# Patient Record
Sex: MALE | Race: WHITE | NOT HISPANIC OR LATINO | Employment: OTHER | URBAN - METROPOLITAN AREA
[De-identification: names, ages, dates, MRNs, and addresses within clinical notes are randomized per-mention and may not be internally consistent; named-entity substitution may affect disease eponyms.]

---

## 2017-01-19 ENCOUNTER — OFFICE VISIT - HEALTHEAST (OUTPATIENT)
Dept: CARDIOLOGY | Facility: CLINIC | Age: 82
End: 2017-01-19

## 2017-01-19 ENCOUNTER — COMMUNICATION - HEALTHEAST (OUTPATIENT)
Dept: TELEHEALTH | Facility: CLINIC | Age: 82
End: 2017-01-19

## 2017-01-19 DIAGNOSIS — I48.3 TYPICAL ATRIAL FLUTTER (H): ICD-10-CM

## 2017-01-19 DIAGNOSIS — I10 ESSENTIAL HYPERTENSION: ICD-10-CM

## 2017-01-19 LAB
ATRIAL RATE - MUSE: 71 BPM
DIASTOLIC BLOOD PRESSURE - MUSE: NORMAL MMHG
INTERPRETATION ECG - MUSE: NORMAL
P AXIS - MUSE: 42 DEGREES
PR INTERVAL - MUSE: 164 MS
QRS DURATION - MUSE: 84 MS
QT - MUSE: 414 MS
QTC - MUSE: 449 MS
R AXIS - MUSE: 54 DEGREES
SYSTOLIC BLOOD PRESSURE - MUSE: NORMAL MMHG
T AXIS - MUSE: 30 DEGREES
VENTRICULAR RATE- MUSE: 71 BPM

## 2017-01-19 ASSESSMENT — MIFFLIN-ST. JEOR: SCORE: 1335.56

## 2017-02-01 ENCOUNTER — AMBULATORY - HEALTHEAST (OUTPATIENT)
Dept: CARDIOLOGY | Facility: CLINIC | Age: 82
End: 2017-02-01

## 2017-02-28 ENCOUNTER — RECORDS - HEALTHEAST (OUTPATIENT)
Dept: LAB | Facility: CLINIC | Age: 82
End: 2017-02-28

## 2017-02-28 LAB
CHOLEST SERPL-MCNC: 183 MG/DL
FASTING STATUS PATIENT QL REPORTED: NO
HDLC SERPL-MCNC: 59 MG/DL
LDLC SERPL CALC-MCNC: 98 MG/DL
TRIGL SERPL-MCNC: 128 MG/DL

## 2018-03-29 ENCOUNTER — RECORDS - HEALTHEAST (OUTPATIENT)
Dept: LAB | Facility: CLINIC | Age: 83
End: 2018-03-29

## 2018-03-29 LAB
ALBUMIN SERPL-MCNC: 3.8 G/DL (ref 3.5–5)
ALP SERPL-CCNC: 63 U/L (ref 45–120)
ALT SERPL W P-5'-P-CCNC: 25 U/L (ref 0–45)
ANION GAP SERPL CALCULATED.3IONS-SCNC: 11 MMOL/L (ref 5–18)
AST SERPL W P-5'-P-CCNC: 28 U/L (ref 0–40)
BILIRUB SERPL-MCNC: 1.2 MG/DL (ref 0–1)
BUN SERPL-MCNC: 14 MG/DL (ref 8–28)
CALCIUM SERPL-MCNC: 9.9 MG/DL (ref 8.5–10.5)
CHLORIDE BLD-SCNC: 103 MMOL/L (ref 98–107)
CHOLEST SERPL-MCNC: 190 MG/DL
CO2 SERPL-SCNC: 28 MMOL/L (ref 22–31)
CREAT SERPL-MCNC: 0.96 MG/DL (ref 0.7–1.3)
FASTING STATUS PATIENT QL REPORTED: NO
GFR SERPL CREATININE-BSD FRML MDRD: >60 ML/MIN/1.73M2
GLUCOSE BLD-MCNC: 108 MG/DL (ref 70–125)
HDLC SERPL-MCNC: 64 MG/DL
LDLC SERPL CALC-MCNC: 102 MG/DL
POTASSIUM BLD-SCNC: 4.1 MMOL/L (ref 3.5–5)
PROT SERPL-MCNC: 6.8 G/DL (ref 6–8)
SODIUM SERPL-SCNC: 142 MMOL/L (ref 136–145)
TRIGL SERPL-MCNC: 122 MG/DL
URATE SERPL-MCNC: 6.1 MG/DL (ref 3–8)

## 2019-04-11 ENCOUNTER — RECORDS - HEALTHEAST (OUTPATIENT)
Dept: LAB | Facility: CLINIC | Age: 84
End: 2019-04-11

## 2019-04-11 LAB
ALBUMIN SERPL-MCNC: 3.9 G/DL (ref 3.5–5)
ALP SERPL-CCNC: 55 U/L (ref 45–120)
ALT SERPL W P-5'-P-CCNC: 30 U/L (ref 0–45)
ANION GAP SERPL CALCULATED.3IONS-SCNC: 10 MMOL/L (ref 5–18)
AST SERPL W P-5'-P-CCNC: 29 U/L (ref 0–40)
BILIRUB SERPL-MCNC: 0.8 MG/DL (ref 0–1)
BUN SERPL-MCNC: 20 MG/DL (ref 8–28)
CALCIUM SERPL-MCNC: 9.9 MG/DL (ref 8.5–10.5)
CHLORIDE BLD-SCNC: 104 MMOL/L (ref 98–107)
CHOLEST SERPL-MCNC: 219 MG/DL
CO2 SERPL-SCNC: 29 MMOL/L (ref 22–31)
CREAT SERPL-MCNC: 1.14 MG/DL (ref 0.7–1.3)
ERYTHROCYTE [DISTWIDTH] IN BLOOD BY AUTOMATED COUNT: 14.3 % (ref 11–14.5)
FASTING STATUS PATIENT QL REPORTED: YES
GFR SERPL CREATININE-BSD FRML MDRD: >60 ML/MIN/1.73M2
GLUCOSE BLD-MCNC: 107 MG/DL (ref 70–125)
HCT VFR BLD AUTO: 46.3 % (ref 40–54)
HDLC SERPL-MCNC: 54 MG/DL
HGB BLD-MCNC: 15.5 G/DL (ref 14–18)
LDLC SERPL CALC-MCNC: 135 MG/DL
MCH RBC QN AUTO: 32 PG (ref 27–34)
MCHC RBC AUTO-ENTMCNC: 33.5 G/DL (ref 32–36)
MCV RBC AUTO: 96 FL (ref 80–100)
PLATELET # BLD AUTO: 171 THOU/UL (ref 140–440)
PMV BLD AUTO: 11 FL (ref 8.5–12.5)
POTASSIUM BLD-SCNC: 4.3 MMOL/L (ref 3.5–5)
PROT SERPL-MCNC: 6.4 G/DL (ref 6–8)
RBC # BLD AUTO: 4.85 MILL/UL (ref 4.4–6.2)
SODIUM SERPL-SCNC: 143 MMOL/L (ref 136–145)
TRIGL SERPL-MCNC: 149 MG/DL
URATE SERPL-MCNC: 5.7 MG/DL (ref 3–8)
WBC: 6.6 THOU/UL (ref 4–11)

## 2021-02-04 ENCOUNTER — COMMUNICATION - HEALTHEAST (OUTPATIENT)
Dept: SCHEDULING | Facility: CLINIC | Age: 86
End: 2021-02-04

## 2021-02-04 ENCOUNTER — HOME CARE/HOSPICE - HEALTHEAST (OUTPATIENT)
Dept: HOME HEALTH SERVICES | Facility: HOME HEALTH | Age: 86
End: 2021-02-04

## 2021-02-08 ENCOUNTER — SURGERY - HEALTHEAST (OUTPATIENT)
Dept: CARDIOLOGY | Facility: HOSPITAL | Age: 86
End: 2021-02-08

## 2021-02-08 ASSESSMENT — MIFFLIN-ST. JEOR
SCORE: 1435.14
SCORE: 1379.34

## 2021-02-09 ASSESSMENT — MIFFLIN-ST. JEOR
SCORE: 1377.53
SCORE: 1353.03

## 2021-02-15 ENCOUNTER — AMBULATORY - HEALTHEAST (OUTPATIENT)
Dept: CARDIOLOGY | Facility: CLINIC | Age: 86
End: 2021-02-15

## 2021-02-15 DIAGNOSIS — I48.91 ATRIAL FIBRILLATION WITH RVR (H): ICD-10-CM

## 2021-02-15 DIAGNOSIS — R00.1 BRADYCARDIA: ICD-10-CM

## 2021-02-15 DIAGNOSIS — I49.5 TACHYCARDIA-BRADYCARDIA SYNDROME (H): ICD-10-CM

## 2021-02-15 DIAGNOSIS — I49.5 TACHY-BRADY SYNDROME (H): ICD-10-CM

## 2021-02-15 ASSESSMENT — MIFFLIN-ST. JEOR: SCORE: 1350.31

## 2021-02-16 LAB
HCC DEVICE COMMENTS: NORMAL
HCC DEVICE IMPLANTING PROVIDER: NORMAL
HCC DEVICE MANUFACTURE: NORMAL
HCC DEVICE MODEL: NORMAL
HCC DEVICE SERIAL NUMBER: NORMAL
HCC DEVICE TYPE: NORMAL

## 2021-02-18 ENCOUNTER — RECORDS - HEALTHEAST (OUTPATIENT)
Dept: LAB | Facility: CLINIC | Age: 86
End: 2021-02-18

## 2021-02-18 LAB
ALBUMIN SERPL-MCNC: 3.1 G/DL (ref 3.5–5)
ANION GAP SERPL CALCULATED.3IONS-SCNC: 6 MMOL/L (ref 5–18)
BUN SERPL-MCNC: 19 MG/DL (ref 8–28)
CALCIUM SERPL-MCNC: 8.4 MG/DL (ref 8.5–10.5)
CHLORIDE BLD-SCNC: 104 MMOL/L (ref 98–107)
CO2 SERPL-SCNC: 29 MMOL/L (ref 22–31)
CREAT SERPL-MCNC: 0.88 MG/DL (ref 0.7–1.3)
GFR SERPL CREATININE-BSD FRML MDRD: >60 ML/MIN/1.73M2
GLUCOSE BLD-MCNC: 106 MG/DL (ref 70–125)
PHOSPHATE SERPL-MCNC: 2.9 MG/DL (ref 2.5–4.5)
POTASSIUM BLD-SCNC: 4.5 MMOL/L (ref 3.5–5)
SODIUM SERPL-SCNC: 139 MMOL/L (ref 136–145)

## 2021-02-23 ENCOUNTER — AMBULATORY - HEALTHEAST (OUTPATIENT)
Dept: CARDIOLOGY | Facility: CLINIC | Age: 86
End: 2021-02-23

## 2021-02-23 ENCOUNTER — COMMUNICATION - HEALTHEAST (OUTPATIENT)
Dept: CARDIOLOGY | Facility: CLINIC | Age: 86
End: 2021-02-23

## 2021-02-23 DIAGNOSIS — Z95.0 CARDIAC PACEMAKER IN SITU: ICD-10-CM

## 2021-02-23 DIAGNOSIS — I48.91 ATRIAL FIBRILLATION WITH RVR (H): ICD-10-CM

## 2021-03-12 ENCOUNTER — AMBULATORY - HEALTHEAST (OUTPATIENT)
Dept: CARDIOLOGY | Facility: CLINIC | Age: 86
End: 2021-03-12

## 2021-03-12 DIAGNOSIS — I49.5 TACHY-BRADY SYNDROME (H): ICD-10-CM

## 2021-03-12 DIAGNOSIS — Z95.0 CARDIAC PACEMAKER IN SITU: ICD-10-CM

## 2021-05-05 ENCOUNTER — AMBULATORY - HEALTHEAST (OUTPATIENT)
Dept: CARDIOLOGY | Facility: CLINIC | Age: 86
End: 2021-05-05

## 2021-05-05 ENCOUNTER — COMMUNICATION - HEALTHEAST (OUTPATIENT)
Dept: CARDIOLOGY | Facility: CLINIC | Age: 86
End: 2021-05-05

## 2021-05-05 DIAGNOSIS — Z95.0 CARDIAC PACEMAKER IN SITU: ICD-10-CM

## 2021-05-05 DIAGNOSIS — I48.91 ATRIAL FIBRILLATION WITH RVR (H): ICD-10-CM

## 2021-05-05 DIAGNOSIS — R00.1 BRADYCARDIA: ICD-10-CM

## 2021-05-05 DIAGNOSIS — I49.5 TACHY-BRADY SYNDROME (H): ICD-10-CM

## 2021-05-05 ASSESSMENT — MIFFLIN-ST. JEOR: SCORE: 1332.17

## 2021-05-11 RX ORDER — DILTIAZEM HYDROCHLORIDE 180 MG/1
180 CAPSULE, COATED, EXTENDED RELEASE ORAL DAILY
Qty: 30 CAPSULE | Refills: 6 | Status: SHIPPED | OUTPATIENT
Start: 2021-05-11 | End: 2021-12-13

## 2021-05-27 VITALS
HEIGHT: 67 IN | HEART RATE: 68 BPM | RESPIRATION RATE: 16 BRPM | BODY MASS INDEX: 24.17 KG/M2 | SYSTOLIC BLOOD PRESSURE: 120 MMHG | DIASTOLIC BLOOD PRESSURE: 82 MMHG | WEIGHT: 154 LBS

## 2021-05-30 VITALS — HEIGHT: 68 IN | WEIGHT: 153 LBS | BODY MASS INDEX: 23.19 KG/M2

## 2021-06-05 VITALS — HEIGHT: 67 IN | BODY MASS INDEX: 24.89 KG/M2 | WEIGHT: 158.6 LBS

## 2021-06-05 VITALS
HEIGHT: 67 IN | HEART RATE: 72 BPM | RESPIRATION RATE: 14 BRPM | BODY MASS INDEX: 24.8 KG/M2 | TEMPERATURE: 97.5 F | SYSTOLIC BLOOD PRESSURE: 170 MMHG | WEIGHT: 158 LBS | DIASTOLIC BLOOD PRESSURE: 90 MMHG

## 2021-06-15 NOTE — PATIENT INSTRUCTIONS - HE
Pacemaker Post-operative Checklist      The Device Registered Nurse (RN) reviewed the pacemaker function.      The Device RN did a wound assessment and wound care teaching.    Please call the Device Nurses with any signs of infection or questions regarding wound healing. Device Nurse Line: [phone], Option #3      The Device RN demonstrated and displayed the specific remote monitoring system for your pacemaker.      The Device RN reviewed the Partnership Agreement Form.    Patient Instructions    Do not lift your left arm above the shoulder height, perform any vigorous arm movements such as swimming, golfing, washing windows, shoveling snow, vacuuming or lifting greater than 10-15lbs with the affected arm for 4 weeks from the date of implant. Last date of restrictions is March 8, 2021.      To reduce the risk of infection, try to avoid any dental procedures for the first 6 weeks after your pacemaker implant. If you have an emergent or urgent dental need during this time, contact the device clinic for a prescription for an antibiotic.      You will receive a device identification (ID) card in the mail from the device  within 6 weeks to replace the temporary ID card you were given in the hospital.      You may travel by any mode of transportation; just show your pacemaker ID card. You may be subject to a hand search or use of a handheld wand, but official should not keep the wand over the implant site for greater than 5-10 seconds.      For any surgery, let your doctor know you have a pacemaker.       Your pacemaker is MRI safe       Most household appliances, including a microwave, will not interfere with your pacemaker function. If you suspect interference, simply move away from the source. Cell phones do not cause a problem. Please refer to the device booklet from the  or their website under the section on electromagnetic interference (SHIRA) for further guidelines on things that may  interfere with your pacemaker.       Device Clinic Contact Information  Device Nurses: [phone], Option #3    Device Remote Specialists: [phone], Option #2. For questions about your Remote Transmission or Transmission Schedule  Device Schedulers: [phone], Option #1

## 2021-06-15 NOTE — PROGRESS NOTES
"DEVICE CLINIC RN POST-OP NOTE    Reason for visit: 1 week PO device and wound check.      Device: Medtronic [id number] Emerita XT  MRI  Procedure date: 2/8/21  Implant Diagnosis: AF, bradycardia  Implanting Physician: Dr. Umana      Assessment  Subjective: Patient denies pain, shortness of breath, and nausea.   Vitals: /90 (Patient Site: Right Arm, Patient Position: Sitting, Cuff Size: Adult Regular)   Pulse 72   Temp 97.5  F (36.4  C)   Resp 14   Ht 5' 7\" (1.702 m)   Wt 158 lb (71.7 kg)   BMI 24.75 kg/m      Heart Sounds: normal  Lung Sounds: clear to auscultation bilaterally  Incision/device pocket: Gabrielle patch removed, steri-strips were removed from the incision and it was cleansed with adhesive remover and alcohol wipes. The incision is clean, dry and intact. There is a large amount of bruising present on the left side starting at the incision site going down to left abdominal area. The device site is swollen, device palpable and patient stated that swelling has decreased significantly. There are no signs of infection present. The incision is well healed.        Device Findings  Interrogation of device reveals normal sensing and capture thresholds  See the Paceart Report for a full summary of the device information.        Patient Education     [facility]'s Partnership Agreement for Device Patients and Post-operative Checklist were presented and reviewed at today's appointment.    Signs and symptoms of infection, poor wound healing, and device function were reviewed. Range of motion and weight restrictions for the left side are 4 weeks. He was issued a Carelink remote monitor and instructed on its set-up and use for remote monitoring by the [employer] representative prior to hospital discharge. These instructions were reviewed with the patient at today s visit.       Plan  -One month remote device check scheduled on 3/12/21.   -Three month in clinic device check with " device nurse scheduled on 5/7/21.

## 2021-06-15 NOTE — TELEPHONE ENCOUNTER
Type: alert pacemaker remote transmission for AT/AF >/=6hrs.   Presenting rhythm: atrial flutter with ventricular sensing 112 bpm.  Battery/lead status: stable.  Arrhythmias: since 2/15/21; 63 mode switch episodes, EGMs show atrial flutter/fibrillation, current episode in progress since 2/22/21 11:54pm, v-rates >/=120bpm ~50%, AF burden 10.4%. Three ventricular high rate episodes, EGMs appear to be RVR during AF.   Anticoagulant: Eliquis.   Comments: normal pacemaker function. Routed to device RN for review. RICK ADD: Transmission reviewed. Intermittent atrial undersensing noted. YY      Attempted to contact patient to review transmission, medications, and symptoms if any. There was no answer or voicemail option.     According to chart review, patient is taking Eliquis 5mg two times a day, Amiodarone 200mg two times a day, and Metoprolol Tartrate 50mg three times a day. Will attempt to contact patient again.     Sea Sparks RN   Device Nurse

## 2021-06-16 PROBLEM — I49.5 TACHY-BRADY SYNDROME (H): Status: ACTIVE | Noted: 2021-02-08

## 2021-06-16 PROBLEM — I49.5 TACHYCARDIA-BRADYCARDIA SYNDROME (H): Status: ACTIVE | Noted: 2021-02-07

## 2021-06-16 PROBLEM — Z95.0 CARDIAC PACEMAKER IN SITU: Status: ACTIVE | Noted: 2021-02-23

## 2021-06-17 ENCOUNTER — AMBULATORY - HEALTHEAST (OUTPATIENT)
Dept: CARDIOLOGY | Facility: CLINIC | Age: 86
End: 2021-06-17

## 2021-06-17 DIAGNOSIS — I48.91 ATRIAL FIBRILLATION WITH RVR (H): ICD-10-CM

## 2021-06-17 DIAGNOSIS — Z95.0 CARDIAC PACEMAKER IN SITU: ICD-10-CM

## 2021-06-17 NOTE — TELEPHONE ENCOUNTER
Type: 3 month PO pacemaker check   Presenting Rhythm:  APVS, 65bpm  Lead/Battery status:  Stable lead and battery measurements.  Arrhythmias: 114 mode switches for 1.4% of the time for a total of 26hrs. VR>/=120~55% of the time. 8 NSVT episodes and 6 fast A+V episodes; EGMs show AT/AF with RVR. Patient is asymptomatic.   Anticoagulant: Eliquis  Comments: Normal device function. RV safety margin programmed to 1.5x. PMT intervention turned on. Reactive ATP programmed to Justina. Incision appears well healed. GRACE

## 2021-06-17 NOTE — TELEPHONE ENCOUNTER
Telephone Encounter by Sea Sparks RN at 5/6/2021  8:36 AM     Author: Sea Sparks RN Service: -- Author Type: Registered Nurse    Filed: 5/6/2021  8:43 AM Encounter Date: 5/5/2021 Status: Signed    : Sea Sparks RN (Registered Nurse)       Tiki Gonzales MD Yang, Youa, RN   Caller: Unspecified (Yesterday, 11:26 AM)             I would forward this to Dr. Castillo as I have not seen this patient in 4 years and Dr. Castillo just saw him in the hospital in February.     Tiki Gonzales's above message noted. Results forwarded to Dr. Castillo to review.     Sea Sparks RN   Device Nurse

## 2021-06-17 NOTE — TELEPHONE ENCOUNTER
Telephone Encounter by Seble Wilburn RN at 5/11/2021  2:38 PM     Author: Seble Wilburn RN Service: -- Author Type: Registered Nurse    Filed: 5/11/2021  3:04 PM Encounter Date: 5/5/2021 Status: Addendum    : Seble Wilburn RN (Registered Nurse)    Related Notes: Original Note by Seble Wilburn RN (Registered Nurse) filed at 5/11/2021  2:49 PM       Dr. Castillo,  Please see update in your absence.  Next remote device check is planned for August. No follow up with you is planned.  Do you have any new orders or recommendations?  Thank you - Seble  --------------------------------------    Reached out to patient with recommendation from Dr. Gonzales to try diltiazem. Patient is agreeable to initiate diltiazem. Reviewed possible side effects and when call the clinic. Medication list updated and Rx sent to [facility] as requested. Reassured Don that update would be forwarded to Dr. Castillo and that he would be contacted with any additional recommendations. Patient verbalized understanding and has no further questions at this time.    -------------------------------------------------------  Tiki Gonzales MD  You 3 minutes ago (2:32 PM)     Could try him on diltiazem  mg daily to try and control ventricular response and see if he notes less symptoms than the metoprolol. I would give him 30 days with 6 refills. He should follow back with Dr. Castillo.     LAURI Gonzales

## 2021-06-17 NOTE — TELEPHONE ENCOUNTER
Telephone Encounter by Seble Wilburn RN at 5/11/2021 10:40 AM     Author: Seble Wilburn RN Service: -- Author Type: Registered Nurse    Filed: 5/11/2021 11:59 AM Encounter Date: 5/5/2021 Status: Signed    : Seble Wilburn RN (Registered Nurse)       Attempted reaching patient; no answer. Will attempt at a later time.  -------------------------------------------------  Sea Sparks RN routed conversation to You 4 days ago      Esperanza Castillo MD Yang, Youa, RN 4 days ago     I reviewed patient's medication list. It appears that he is on metoprolol 50 mg 3 times daily (somewhat unusual dosing schedule if indeed the case). Lets confirm with patient that he is indeed taking 50 mg 3 times daily. If so, would add advocate converting to 100 mg twice daily (overall somewhat increased dose) to help control ventricular response when in atrial fibrillation. Since patient would appear to be on anticoagulation, as long as patient not significantly symptomatic; do not need routine notifications of atrial fibrillation unless ventricular response not well controlled. Thanks.    Message text       Sea Sparks RN Reisdorf, Franz-Josef E, MD 5 days ago      AT/AF burden 1.4% of the time with VR>/=120~55% of the time. Please review and let us know how often or you'd like to be notify of AT/AF. Typically providers are notified anytime AT/AF burden is >/=6hrs. I specifically routed this for the AF with RVR, though its a relatively small burden. Thanks!    Routing comment

## 2021-06-17 NOTE — TELEPHONE ENCOUNTER
Telephone Encounter by Seble Wilburn RN at 5/17/2021 10:57 AM     Author: Sbele Wilburn RN Service: -- Author Type: Registered Nurse    Filed: 5/17/2021 10:59 AM Encounter Date: 5/5/2021 Status: Signed    : Seble Wilburn RN (Registered Nurse)       Hi,  Please arrange device check in 3 to 4 weeks per message below.   Thank you,  Seble  ------------------------------------------  Esperanza Castillo MD  You 45 minutes ago (10:10 AM)     Communications reviewed.  Let simply arrange for patient to have a device interrogation in approximately 3-4 weeks to assess efficacy of medication changes.  Thanks.

## 2021-06-17 NOTE — TELEPHONE ENCOUNTER
"Dr. Gonzales,  I see that you have not seen this patient in over 4 years.  In the absence of Dr. Castillo will you please see update below and advise?  Since he has stopped taking metoprolol he is feeling better.  Any new recommendations?  Thank you - Seble    -----------------------------------  Called patient with recommendation to increase metoprolol tartrate to 100 mg twice daily.  Patient stopped metoprolol at least 6 weeks ago. He states the metoprolol made him feel \"like a zombie\", drained of energy and caused intolerable constipation.  Since stopping the metoprolol he is feeling \"very good\" and walking his dog 3 times a day. Informed patient an update will be sent to Dr. Castillo and patient will be contacted if new recommendations are given. Patient verbalized understanding and has no further questions at this time.      "

## 2021-06-21 NOTE — LETTER
Letter by Zoila Langston RDCS at      Author: Zoila Langston RDCS Service: -- Author Type: --    Filed:  Encounter Date: 3/12/2021 Status: (Other)         Daniel Sawant  [address] [address] [place] [id number]      March 12, 2021      Dear Mr. Sawant,    RE: Remote Results    We are writing to you regarding your recent Remote Pacemaker check from home. Your transmission was received successfully. Battery status is satisfactory at this time.     Your results are showing no significant changes.    Your next device appointment will be a clinic visit on May 5, 2021 at 10:20AM at our  [place] Location, [facility] [facility], [address].    To schedule or reschedule, please call [phone] and press 1.    NOTE: If you would like to do an extra transmission, please call [phone] and press 3 to speak to a nurse BEFORE transmitting. This ensures that the Device Clinic staff is aware of the reason you are sending a transmission, and can follow-up with you after it has been reviewed.    We will be checking your implanted device from home (remotely) every three months unless otherwise instructed. We will need to see you in the clinic at least once a year. You may need to be seen in the clinic sooner depending on the results of your check.    Please be aware:    The follow-up schedule is like a Physician prescription.    Your remote monitor is paired to your specific implanted device.      Sincerely,    [facility] Device Clinic

## 2021-06-25 NOTE — PROGRESS NOTES
Progress Notes by Tiki Gonzales MD at 1/19/2017 10:10 AM     Author: Tiki Gonzales MD Service: -- Author Type: Physician    Filed: 1/19/2017 12:14 PM Encounter Date: 1/19/2017 Status: Signed    : Tiki Gonzales MD (Physician)           Click to link to [facility]     [facility] HEART CARE NOTE    Thank you, Dr. Villeda, for asking the [facility] team to see Mr. Daniel Sawant to evaluate atrial dysrhythmia..    Assessment/Recommendations   Assessment:    1.  Paroxysmal atrial fib/flutter.  This was documented 18 months ago when I saw him in clinic.  Because of episodes of rapid ventricular response to his atrial flutter, mixed with sinus bradycardia, he was admitted to the hospital and initiated on sotalol 80 mg tab twice daily.  He states he took this for approximately 6-10 months at which point he stopped because his heart rates were in the 40s.  He has had no evidence of recurrent atrial dysrhythmia since that time.  Because of some irregularity in his heart beat today, an ECG was obtained demonstrating sinus rhythm with occasional PACs.  At this point, I told him there is no indication to resume the sotalol.  2.  Hypertension, inadequately controlled.  His blood pressure was markedly elevated on 2 readings here today.  I suggested we increase his losartan to 100 mg daily.  I will have him follow-up with his primary physician in 2 weeks for recheck.      Plan:  1.  Increase losartan to 100 mg daily (2-50 mg tablets).  2.  Follow-up with Dr. Villeda in 2 weeks.  I encouraged him to take his blood pressure cuff with him to have this calibrated.       History of Present Illness    Mr. Daniel Sawant is a 82 y.o. male with history of paroxysmal atrial fib/flutter and hypertension who returns today for a follow-up visit.  I initially met him in August 2015 when he presented for evaluation due to an abnormal EKG.  ECG in the office demonstrated atrial flutter with 2:1 conduction, intermixed  "with periods of sinus bradycardia.  Because of concern of sick sinus syndrome, I recommended admission to the hospital where he was started on sotalol 80 mg twice daily.  He did develop mild sinus bradycardia to the upper 50s but denied any symptoms of lightheadedness or near syncope.  He was subsequently discharged home and never seen back in our clinic in follow-up.  I understand he took the sotalol for approximately 6-10 months at which point he stopped it because of heart rates in the mid 40s and feeling lethargic.  Since stopping the sotalol, he denies any feelings of palpitations or heart racing.  He has noted \"error\" readings on his blood pressure cuff recently and tells me he is thinking about getting a new blood pressure cuff.  I told him that the error reading may be to irregular heart beating or markedly elevated blood pressures.    ECG (personally reviewed): ECG obtained in the office today demonstrates sinus rhythm with occasional PACs.  There is possible left atrial enlargement.    ECHO (personally reviewed): No recent echo      Physical Examination Review of Systems   Vitals:    01/19/17 1043   BP: (!) 200/110   Pulse: 73   Resp:      Body mass index is 23.61 kg/(m^2).  Wt Readings from Last 3 Encounters:   01/19/17 153 lb (69.4 kg)   08/16/15 148 lb 8 oz (67.4 kg)   08/14/15 147 lb (66.7 kg)     General Appearance:   Awake, Alert, No acute distress.   HEENT:  No scleral icterus; the mucous membranes were pink and moist.   Neck: No cervical bruits or jugular venous distention    Chest: The spine was straight. The chest was symmetric.   Lungs:   Respirations unlabored; the lungs are clear to auscultation. No wheezing   Cardiovascular:    regular rhythm with occasional ectopic beats.  S1, S2 normal.  No murmur, gallop or rub    Abdomen:  No organomegaly, masses, bruits, or tenderness. Bowels sounds are present   Extremities:  no peripheral edema bilaterally    Skin: No xanthelasma. Warm, Dry. "   Musculoskeletal: No tenderness.   Neurologic: Mood and affect are appropriate.    General: WNL  Eyes: WNL  Ears/Nose/Throat: WNL  Lungs: WNL  Heart: WNL  Stomach: WNL  Bladder: WNL  Muscle/Joints: WNL  Skin: WNL  Nervous System: WNL  Mental Health: WNL     Blood: WNL     Medical History  Surgical History Family History Social History   Past Medical History   Diagnosis Date   ? Arthritis      bilateral hands   ? Atrial fibrillation    ? Atrial flutter    ? Cancer      skin cancer   ? Gout    ? Hypertension     Past Surgical History   Procedure Laterality Date   ? Hernia repair Right 1988     inquinal hernia   ? Circumcision     ? Skin biopsy       to remove skin cancer   ? Tonsillectomy      No family history of early coronary artery disease  Social History     Social History   ? Marital status:      Spouse name: N/A   ? Number of children: N/A   ? Years of education: N/A     Occupational History   ? Not on file.     Social History Main Topics   ? Smoking status: Never Smoker   ? Smokeless tobacco: Not on file   ? Alcohol use No      Comment: very little   ? Drug use: No   ? Sexual activity: Not on file     Other Topics Concern   ? Not on file     Social History Narrative          Medications  Allergies   Current Outpatient Prescriptions   Medication Sig Dispense Refill   ? allopurinol (ZYLOPRIM) 100 MG tablet Take 100 mg by mouth daily with supper.      ? arginine HCl, L-arginine, 1,000 mg Tab Take by mouth 2 (two) times a day.     ? calcium carbonate-vit D3-min (CALCIUM-VITAMIN D) 600 mg calcium- 400 unit Tab Take 0.5 tablets by mouth 2 (two) times a day.      ? dabigatran etexilate (PRADAXA) 150 mg capsu Take 150 mg by mouth 2 (two) times a day.     ? DOCOSAHEXANOIC ACID/EPA (FISH OIL ORAL) Take 500 mg by mouth 2 (two) times a day.     ? losartan (COZAAR) 50 MG tablet Take 2 tablets (100 mg total) by mouth daily. 180 tablet 3   ? MULTIVITAMIN ORAL Take by mouth.     ? omeprazole (PRILOSEC) 40 MG capsule  Take 40 mg by mouth daily with supper.      ? SAW PALMETTO ORAL Take by mouth.     ? simvastatin (ZOCOR) 5 MG tablet Take 5 mg by mouth bedtime.      ? glucosamine-chondroitin 500-400 mg cap Take 3 capsules by mouth 2 (two) times a day.     ? UNABLE TO FIND Med Name: Beta Sitosterol 375mg twice a day       No current facility-administered medications for this visit.       No Known Allergies      Lab Results    Chemistry/lipid CBC Cardiac Enzymes/BNP/TSH/INR   Lab Results   Component Value Date    CHOL 160 02/22/2016    HDL 46 02/22/2016    LDLCALC 95 02/22/2016    TRIG 95 02/22/2016    CREATININE 0.98 02/22/2016    BUN 18 02/22/2016    K 4.3 03/29/2016     02/22/2016     02/22/2016    CO2 28 02/22/2016    No results found for: WBC, HGB, HCT, MCV, PLT Lab Results   Component Value Date    TSH 1.11 08/13/2015

## 2021-06-26 ENCOUNTER — HEALTH MAINTENANCE LETTER (OUTPATIENT)
Age: 86
End: 2021-06-26

## 2021-07-04 NOTE — LETTER
Letter by Nishi Cantu at      Author: Nishi Cantu Service: -- Author Type: --    Filed:  Encounter Date: 6/17/2021 Status: (Other)         Daniel Sawant  [address] [address] [place] [id number]      June 17, 2021      Dear Mr. Sawant,    RE: Remote Results    We are writing to you regarding your recent Remote Pacemaker check from home. Your transmission was received successfully. Battery status is satisfactory at this time.     Your results are showing no significant changes.    Your next device appointment will be a remote check on September 27, 2021; this will occur automatically.    To schedule or reschedule, please call [phone] and press 1.    NOTE: If you would like to do an extra transmission, please call [phone] and press 3 to speak to a nurse BEFORE transmitting. This ensures that the Device Clinic staff is aware of the reason you are sending a transmission, and can follow-up with you after it has been reviewed.    We will be checking your implanted device from home (remotely) every three months unless otherwise instructed. We will need to see you in the clinic at least once a year. You may need to be seen in the clinic sooner depending on the results of your check.    Please be aware:    The follow-up schedule is like a Physician prescription.    Your remote monitor is paired to your specific implanted device.      Sincerely,    [facility] Device [facility]

## 2021-07-22 NOTE — PROGRESS NOTES
In clinic device check.  Please see link for full device report.  Patient was informed of results and next follow up during today's visit.  
n/a
heparin given

## 2021-09-27 ENCOUNTER — ANCILLARY PROCEDURE (OUTPATIENT)
Dept: CARDIOLOGY | Facility: CLINIC | Age: 86
End: 2021-09-27
Attending: INTERNAL MEDICINE

## 2021-09-27 DIAGNOSIS — Z95.0 CARDIAC PACEMAKER IN SITU: ICD-10-CM

## 2021-09-27 PROCEDURE — 93294 REM INTERROG EVL PM/LDLS PM: CPT | Performed by: INTERNAL MEDICINE

## 2021-09-27 PROCEDURE — 93296 REM INTERROG EVL PM/IDS: CPT | Performed by: INTERNAL MEDICINE

## 2021-09-29 LAB
MDC_IDC_EPISODE_DTM: NORMAL
MDC_IDC_EPISODE_DURATION: 0 S
MDC_IDC_EPISODE_DURATION: 1 S
MDC_IDC_EPISODE_DURATION: 1138 S
MDC_IDC_EPISODE_DURATION: 115 S
MDC_IDC_EPISODE_DURATION: 13 S
MDC_IDC_EPISODE_DURATION: 133 S
MDC_IDC_EPISODE_DURATION: 140 S
MDC_IDC_EPISODE_DURATION: 144 S
MDC_IDC_EPISODE_DURATION: 15 S
MDC_IDC_EPISODE_DURATION: 154 S
MDC_IDC_EPISODE_DURATION: 1544 S
MDC_IDC_EPISODE_DURATION: 1575 S
MDC_IDC_EPISODE_DURATION: 160 S
MDC_IDC_EPISODE_DURATION: 1708 S
MDC_IDC_EPISODE_DURATION: 1764 S
MDC_IDC_EPISODE_DURATION: 177 S
MDC_IDC_EPISODE_DURATION: 179 S
MDC_IDC_EPISODE_DURATION: 18 S
MDC_IDC_EPISODE_DURATION: 1916 S
MDC_IDC_EPISODE_DURATION: 1918 S
MDC_IDC_EPISODE_DURATION: 196 S
MDC_IDC_EPISODE_DURATION: 204 S
MDC_IDC_EPISODE_DURATION: 21 S
MDC_IDC_EPISODE_DURATION: 2141 S
MDC_IDC_EPISODE_DURATION: 22 S
MDC_IDC_EPISODE_DURATION: 23 S
MDC_IDC_EPISODE_DURATION: 24 S
MDC_IDC_EPISODE_DURATION: 245 S
MDC_IDC_EPISODE_DURATION: 25 S
MDC_IDC_EPISODE_DURATION: 2591 S
MDC_IDC_EPISODE_DURATION: 270 S
MDC_IDC_EPISODE_DURATION: 31 S
MDC_IDC_EPISODE_DURATION: 340 S
MDC_IDC_EPISODE_DURATION: 342 S
MDC_IDC_EPISODE_DURATION: 348 S
MDC_IDC_EPISODE_DURATION: 35 S
MDC_IDC_EPISODE_DURATION: 36 S
MDC_IDC_EPISODE_DURATION: 363 S
MDC_IDC_EPISODE_DURATION: 369 S
MDC_IDC_EPISODE_DURATION: 369 S
MDC_IDC_EPISODE_DURATION: 38 S
MDC_IDC_EPISODE_DURATION: 381 S
MDC_IDC_EPISODE_DURATION: 384 S
MDC_IDC_EPISODE_DURATION: 39 S
MDC_IDC_EPISODE_DURATION: 39 S
MDC_IDC_EPISODE_DURATION: 40 S
MDC_IDC_EPISODE_DURATION: 413 S
MDC_IDC_EPISODE_DURATION: 43 S
MDC_IDC_EPISODE_DURATION: 4335 S
MDC_IDC_EPISODE_DURATION: 434 S
MDC_IDC_EPISODE_DURATION: 437 S
MDC_IDC_EPISODE_DURATION: 44 S
MDC_IDC_EPISODE_DURATION: 446 S
MDC_IDC_EPISODE_DURATION: 46 S
MDC_IDC_EPISODE_DURATION: 47 S
MDC_IDC_EPISODE_DURATION: 491 S
MDC_IDC_EPISODE_DURATION: 4953 S
MDC_IDC_EPISODE_DURATION: 5045 S
MDC_IDC_EPISODE_DURATION: 51 S
MDC_IDC_EPISODE_DURATION: 52 S
MDC_IDC_EPISODE_DURATION: 530 S
MDC_IDC_EPISODE_DURATION: 54 S
MDC_IDC_EPISODE_DURATION: 56 S
MDC_IDC_EPISODE_DURATION: 59 S
MDC_IDC_EPISODE_DURATION: 599 S
MDC_IDC_EPISODE_DURATION: 60 S
MDC_IDC_EPISODE_DURATION: 604 S
MDC_IDC_EPISODE_DURATION: 61 S
MDC_IDC_EPISODE_DURATION: 61 S
MDC_IDC_EPISODE_DURATION: 62 S
MDC_IDC_EPISODE_DURATION: 64 S
MDC_IDC_EPISODE_DURATION: 642 S
MDC_IDC_EPISODE_DURATION: 6782 S
MDC_IDC_EPISODE_DURATION: 71 S
MDC_IDC_EPISODE_DURATION: 7230 S
MDC_IDC_EPISODE_DURATION: 73 S
MDC_IDC_EPISODE_DURATION: 74 S
MDC_IDC_EPISODE_DURATION: 76 S
MDC_IDC_EPISODE_DURATION: 80 S
MDC_IDC_EPISODE_DURATION: 842 S
MDC_IDC_EPISODE_DURATION: 908 S
MDC_IDC_EPISODE_DURATION: 979 S
MDC_IDC_EPISODE_DURATION: 999 S
MDC_IDC_EPISODE_DURATION: NORMAL S
MDC_IDC_EPISODE_ID: 425
MDC_IDC_EPISODE_ID: 426
MDC_IDC_EPISODE_ID: 427
MDC_IDC_EPISODE_ID: 429
MDC_IDC_EPISODE_ID: 431
MDC_IDC_EPISODE_ID: 435
MDC_IDC_EPISODE_ID: 436
MDC_IDC_EPISODE_ID: 455
MDC_IDC_EPISODE_ID: 457
MDC_IDC_EPISODE_ID: 466
MDC_IDC_EPISODE_ID: 472
MDC_IDC_EPISODE_ID: 474
MDC_IDC_EPISODE_ID: 476
MDC_IDC_EPISODE_ID: 482
MDC_IDC_EPISODE_ID: 493
MDC_IDC_EPISODE_ID: 500
MDC_IDC_EPISODE_ID: 506
MDC_IDC_EPISODE_ID: 542
MDC_IDC_EPISODE_ID: 544
MDC_IDC_EPISODE_ID: 548
MDC_IDC_EPISODE_ID: 552
MDC_IDC_EPISODE_ID: 556
MDC_IDC_EPISODE_ID: 567
MDC_IDC_EPISODE_ID: 576
MDC_IDC_EPISODE_ID: 579
MDC_IDC_EPISODE_ID: 581
MDC_IDC_EPISODE_ID: 583
MDC_IDC_EPISODE_ID: 608
MDC_IDC_EPISODE_ID: 614
MDC_IDC_EPISODE_ID: 629
MDC_IDC_EPISODE_ID: 630
MDC_IDC_EPISODE_ID: 631
MDC_IDC_EPISODE_ID: 645
MDC_IDC_EPISODE_ID: 646
MDC_IDC_EPISODE_ID: 650
MDC_IDC_EPISODE_ID: 667
MDC_IDC_EPISODE_ID: 678
MDC_IDC_EPISODE_ID: 706
MDC_IDC_EPISODE_ID: 721
MDC_IDC_EPISODE_ID: 728
MDC_IDC_EPISODE_ID: 729
MDC_IDC_EPISODE_ID: 730
MDC_IDC_EPISODE_ID: 731
MDC_IDC_EPISODE_ID: 732
MDC_IDC_EPISODE_ID: 733
MDC_IDC_EPISODE_ID: 734
MDC_IDC_EPISODE_ID: 735
MDC_IDC_EPISODE_ID: 736
MDC_IDC_EPISODE_ID: 737
MDC_IDC_EPISODE_ID: 738
MDC_IDC_EPISODE_ID: 739
MDC_IDC_EPISODE_ID: 740
MDC_IDC_EPISODE_ID: 741
MDC_IDC_EPISODE_ID: 742
MDC_IDC_EPISODE_ID: 743
MDC_IDC_EPISODE_ID: 744
MDC_IDC_EPISODE_ID: 745
MDC_IDC_EPISODE_ID: 746
MDC_IDC_EPISODE_ID: 747
MDC_IDC_EPISODE_ID: 748
MDC_IDC_EPISODE_ID: 749
MDC_IDC_EPISODE_ID: 750
MDC_IDC_EPISODE_ID: 751
MDC_IDC_EPISODE_ID: 752
MDC_IDC_EPISODE_ID: 753
MDC_IDC_EPISODE_ID: 754
MDC_IDC_EPISODE_ID: 755
MDC_IDC_EPISODE_ID: 756
MDC_IDC_EPISODE_ID: 757
MDC_IDC_EPISODE_ID: 758
MDC_IDC_EPISODE_ID: 759
MDC_IDC_EPISODE_ID: 760
MDC_IDC_EPISODE_ID: 761
MDC_IDC_EPISODE_ID: 762
MDC_IDC_EPISODE_ID: 763
MDC_IDC_EPISODE_ID: 764
MDC_IDC_EPISODE_ID: 765
MDC_IDC_EPISODE_ID: 766
MDC_IDC_EPISODE_ID: 767
MDC_IDC_EPISODE_ID: 768
MDC_IDC_EPISODE_ID: 769
MDC_IDC_EPISODE_ID: 770
MDC_IDC_EPISODE_ID: 771
MDC_IDC_EPISODE_ID: 772
MDC_IDC_EPISODE_ID: 773
MDC_IDC_EPISODE_ID: 774
MDC_IDC_EPISODE_ID: 775
MDC_IDC_EPISODE_ID: 776
MDC_IDC_EPISODE_ID: 777
MDC_IDC_EPISODE_ID: 778
MDC_IDC_EPISODE_ID: 779
MDC_IDC_EPISODE_ID: 780
MDC_IDC_EPISODE_ID: 781
MDC_IDC_EPISODE_ID: 782
MDC_IDC_EPISODE_TYPE: NORMAL
MDC_IDC_LEAD_IMPLANT_DT: NORMAL
MDC_IDC_LEAD_IMPLANT_DT: NORMAL
MDC_IDC_LEAD_LOCATION: NORMAL
MDC_IDC_LEAD_LOCATION: NORMAL
MDC_IDC_LEAD_LOCATION_DETAIL_1: NORMAL
MDC_IDC_LEAD_LOCATION_DETAIL_1: NORMAL
MDC_IDC_LEAD_MFG: NORMAL
MDC_IDC_LEAD_MFG: NORMAL
MDC_IDC_LEAD_MODEL: NORMAL
MDC_IDC_LEAD_MODEL: NORMAL
MDC_IDC_LEAD_POLARITY_TYPE: NORMAL
MDC_IDC_LEAD_POLARITY_TYPE: NORMAL
MDC_IDC_LEAD_SERIAL: NORMAL
MDC_IDC_LEAD_SERIAL: NORMAL
MDC_IDC_LEAD_SPECIAL_FUNCTION: 69
MDC_IDC_MSMT_BATTERY_DTM: NORMAL
MDC_IDC_MSMT_BATTERY_REMAINING_LONGEVITY: 157 MO
MDC_IDC_MSMT_BATTERY_RRT_TRIGGER: 2.62
MDC_IDC_MSMT_BATTERY_STATUS: NORMAL
MDC_IDC_MSMT_BATTERY_VOLTAGE: 3.15 V
MDC_IDC_MSMT_LEADCHNL_RA_IMPEDANCE_VALUE: 323 OHM
MDC_IDC_MSMT_LEADCHNL_RA_IMPEDANCE_VALUE: 456 OHM
MDC_IDC_MSMT_LEADCHNL_RA_PACING_THRESHOLD_AMPLITUDE: 0.62 V
MDC_IDC_MSMT_LEADCHNL_RA_PACING_THRESHOLD_PULSEWIDTH: 0.4 MS
MDC_IDC_MSMT_LEADCHNL_RA_SENSING_INTR_AMPL: 2.75 MV
MDC_IDC_MSMT_LEADCHNL_RA_SENSING_INTR_AMPL: 2.75 MV
MDC_IDC_MSMT_LEADCHNL_RV_IMPEDANCE_VALUE: 380 OHM
MDC_IDC_MSMT_LEADCHNL_RV_IMPEDANCE_VALUE: 589 OHM
MDC_IDC_MSMT_LEADCHNL_RV_PACING_THRESHOLD_AMPLITUDE: 1 V
MDC_IDC_MSMT_LEADCHNL_RV_PACING_THRESHOLD_PULSEWIDTH: 0.4 MS
MDC_IDC_MSMT_LEADCHNL_RV_SENSING_INTR_AMPL: 24.62 MV
MDC_IDC_MSMT_LEADCHNL_RV_SENSING_INTR_AMPL: 24.62 MV
MDC_IDC_PG_IMPLANT_DTM: NORMAL
MDC_IDC_PG_MFG: NORMAL
MDC_IDC_PG_MODEL: NORMAL
MDC_IDC_PG_SERIAL: NORMAL
MDC_IDC_PG_TYPE: NORMAL
MDC_IDC_SESS_CLINIC_NAME: NORMAL
MDC_IDC_SESS_DTM: NORMAL
MDC_IDC_SESS_TYPE: NORMAL
MDC_IDC_SET_BRADY_AT_MODE_SWITCH_RATE: 171 {BEATS}/MIN
MDC_IDC_SET_BRADY_HYSTRATE: NORMAL
MDC_IDC_SET_BRADY_LOWRATE: 60 {BEATS}/MIN
MDC_IDC_SET_BRADY_MAX_SENSOR_RATE: 130 {BEATS}/MIN
MDC_IDC_SET_BRADY_MAX_TRACKING_RATE: 130 {BEATS}/MIN
MDC_IDC_SET_BRADY_MODE: NORMAL
MDC_IDC_SET_BRADY_PAV_DELAY_LOW: 180 MS
MDC_IDC_SET_BRADY_SAV_DELAY_LOW: 150 MS
MDC_IDC_SET_LEADCHNL_RA_PACING_AMPLITUDE: 1.5 V
MDC_IDC_SET_LEADCHNL_RA_PACING_ANODE_ELECTRODE_1: NORMAL
MDC_IDC_SET_LEADCHNL_RA_PACING_ANODE_LOCATION_1: NORMAL
MDC_IDC_SET_LEADCHNL_RA_PACING_CAPTURE_MODE: NORMAL
MDC_IDC_SET_LEADCHNL_RA_PACING_CATHODE_ELECTRODE_1: NORMAL
MDC_IDC_SET_LEADCHNL_RA_PACING_CATHODE_LOCATION_1: NORMAL
MDC_IDC_SET_LEADCHNL_RA_PACING_POLARITY: NORMAL
MDC_IDC_SET_LEADCHNL_RA_PACING_PULSEWIDTH: 0.4 MS
MDC_IDC_SET_LEADCHNL_RA_SENSING_ANODE_ELECTRODE_1: NORMAL
MDC_IDC_SET_LEADCHNL_RA_SENSING_ANODE_LOCATION_1: NORMAL
MDC_IDC_SET_LEADCHNL_RA_SENSING_CATHODE_ELECTRODE_1: NORMAL
MDC_IDC_SET_LEADCHNL_RA_SENSING_CATHODE_LOCATION_1: NORMAL
MDC_IDC_SET_LEADCHNL_RA_SENSING_POLARITY: NORMAL
MDC_IDC_SET_LEADCHNL_RA_SENSING_SENSITIVITY: 0.15 MV
MDC_IDC_SET_LEADCHNL_RV_PACING_AMPLITUDE: 1.5 V
MDC_IDC_SET_LEADCHNL_RV_PACING_ANODE_ELECTRODE_1: NORMAL
MDC_IDC_SET_LEADCHNL_RV_PACING_ANODE_LOCATION_1: NORMAL
MDC_IDC_SET_LEADCHNL_RV_PACING_CAPTURE_MODE: NORMAL
MDC_IDC_SET_LEADCHNL_RV_PACING_CATHODE_ELECTRODE_1: NORMAL
MDC_IDC_SET_LEADCHNL_RV_PACING_CATHODE_LOCATION_1: NORMAL
MDC_IDC_SET_LEADCHNL_RV_PACING_POLARITY: NORMAL
MDC_IDC_SET_LEADCHNL_RV_PACING_PULSEWIDTH: 0.4 MS
MDC_IDC_SET_LEADCHNL_RV_SENSING_ANODE_ELECTRODE_1: NORMAL
MDC_IDC_SET_LEADCHNL_RV_SENSING_ANODE_LOCATION_1: NORMAL
MDC_IDC_SET_LEADCHNL_RV_SENSING_CATHODE_ELECTRODE_1: NORMAL
MDC_IDC_SET_LEADCHNL_RV_SENSING_CATHODE_LOCATION_1: NORMAL
MDC_IDC_SET_LEADCHNL_RV_SENSING_POLARITY: NORMAL
MDC_IDC_SET_LEADCHNL_RV_SENSING_SENSITIVITY: 0.9 MV
MDC_IDC_SET_ZONE_DETECTION_INTERVAL: 200 MS
MDC_IDC_SET_ZONE_DETECTION_INTERVAL: 350 MS
MDC_IDC_SET_ZONE_DETECTION_INTERVAL: 400 MS
MDC_IDC_SET_ZONE_TYPE: NORMAL
MDC_IDC_STAT_AT_BURDEN_PERCENT: 9.4 %
MDC_IDC_STAT_AT_DTM_END: NORMAL
MDC_IDC_STAT_AT_DTM_START: NORMAL
MDC_IDC_STAT_BRADY_AP_VP_PERCENT: 0.05 %
MDC_IDC_STAT_BRADY_AP_VS_PERCENT: 99.53 %
MDC_IDC_STAT_BRADY_AS_VP_PERCENT: 0 %
MDC_IDC_STAT_BRADY_AS_VS_PERCENT: 0.86 %
MDC_IDC_STAT_BRADY_DTM_END: NORMAL
MDC_IDC_STAT_BRADY_DTM_START: NORMAL
MDC_IDC_STAT_BRADY_RA_PERCENT_PACED: 90.38 %
MDC_IDC_STAT_BRADY_RV_PERCENT_PACED: 0.61 %
MDC_IDC_STAT_EPISODE_RECENT_COUNT: 0
MDC_IDC_STAT_EPISODE_RECENT_COUNT: 10
MDC_IDC_STAT_EPISODE_RECENT_COUNT: 354
MDC_IDC_STAT_EPISODE_RECENT_COUNT_DTM_END: NORMAL
MDC_IDC_STAT_EPISODE_RECENT_COUNT_DTM_START: NORMAL
MDC_IDC_STAT_EPISODE_TOTAL_COUNT: 0
MDC_IDC_STAT_EPISODE_TOTAL_COUNT: 0
MDC_IDC_STAT_EPISODE_TOTAL_COUNT: 18
MDC_IDC_STAT_EPISODE_TOTAL_COUNT: 6
MDC_IDC_STAT_EPISODE_TOTAL_COUNT: 758
MDC_IDC_STAT_EPISODE_TOTAL_COUNT_DTM_END: NORMAL
MDC_IDC_STAT_EPISODE_TOTAL_COUNT_DTM_START: NORMAL
MDC_IDC_STAT_EPISODE_TYPE: NORMAL

## 2021-10-16 ENCOUNTER — HEALTH MAINTENANCE LETTER (OUTPATIENT)
Age: 86
End: 2021-10-16

## 2022-01-06 ENCOUNTER — ANCILLARY PROCEDURE (OUTPATIENT)
Dept: CARDIOLOGY | Facility: CLINIC | Age: 87
End: 2022-01-06
Attending: INTERNAL MEDICINE

## 2022-01-06 DIAGNOSIS — Z95.0 PACEMAKER: ICD-10-CM

## 2022-01-06 DIAGNOSIS — I49.5 SICK SINUS SYNDROME (H): ICD-10-CM

## 2022-01-06 LAB
MDC_IDC_EPISODE_DTM: NORMAL
MDC_IDC_EPISODE_DURATION: 0 S
MDC_IDC_EPISODE_DURATION: 1 S
MDC_IDC_EPISODE_DURATION: 1056 S
MDC_IDC_EPISODE_DURATION: 106 S
MDC_IDC_EPISODE_DURATION: 1072 S
MDC_IDC_EPISODE_DURATION: 109 S
MDC_IDC_EPISODE_DURATION: 110 S
MDC_IDC_EPISODE_DURATION: 1107 S
MDC_IDC_EPISODE_DURATION: 111 S
MDC_IDC_EPISODE_DURATION: 1234 S
MDC_IDC_EPISODE_DURATION: 1265 S
MDC_IDC_EPISODE_DURATION: 129 S
MDC_IDC_EPISODE_DURATION: 1291 S
MDC_IDC_EPISODE_DURATION: 130 S
MDC_IDC_EPISODE_DURATION: 1413 S
MDC_IDC_EPISODE_DURATION: 142 S
MDC_IDC_EPISODE_DURATION: 1460 S
MDC_IDC_EPISODE_DURATION: 147 S
MDC_IDC_EPISODE_DURATION: 1600 S
MDC_IDC_EPISODE_DURATION: 1631 S
MDC_IDC_EPISODE_DURATION: 17 S
MDC_IDC_EPISODE_DURATION: 175 S
MDC_IDC_EPISODE_DURATION: 1767 S
MDC_IDC_EPISODE_DURATION: 18 S
MDC_IDC_EPISODE_DURATION: 1837 S
MDC_IDC_EPISODE_DURATION: 2 S
MDC_IDC_EPISODE_DURATION: 2 S
MDC_IDC_EPISODE_DURATION: 208 S
MDC_IDC_EPISODE_DURATION: 21 S
MDC_IDC_EPISODE_DURATION: 2199 S
MDC_IDC_EPISODE_DURATION: 22 S
MDC_IDC_EPISODE_DURATION: 23 S
MDC_IDC_EPISODE_DURATION: 234 S
MDC_IDC_EPISODE_DURATION: 24 S
MDC_IDC_EPISODE_DURATION: 2423 S
MDC_IDC_EPISODE_DURATION: 259 S
MDC_IDC_EPISODE_DURATION: 287 S
MDC_IDC_EPISODE_DURATION: 2896 S
MDC_IDC_EPISODE_DURATION: 29 S
MDC_IDC_EPISODE_DURATION: 296 S
MDC_IDC_EPISODE_DURATION: 30 S
MDC_IDC_EPISODE_DURATION: 3027 S
MDC_IDC_EPISODE_DURATION: 3092 S
MDC_IDC_EPISODE_DURATION: 31 S
MDC_IDC_EPISODE_DURATION: 3215 S
MDC_IDC_EPISODE_DURATION: 33 S
MDC_IDC_EPISODE_DURATION: 336 S
MDC_IDC_EPISODE_DURATION: 340 S
MDC_IDC_EPISODE_DURATION: 341 S
MDC_IDC_EPISODE_DURATION: 3657 S
MDC_IDC_EPISODE_DURATION: 4260 S
MDC_IDC_EPISODE_DURATION: 43 S
MDC_IDC_EPISODE_DURATION: 43 S
MDC_IDC_EPISODE_DURATION: 45 S
MDC_IDC_EPISODE_DURATION: 47 S
MDC_IDC_EPISODE_DURATION: 484 S
MDC_IDC_EPISODE_DURATION: 499 S
MDC_IDC_EPISODE_DURATION: 5042 S
MDC_IDC_EPISODE_DURATION: 51 S
MDC_IDC_EPISODE_DURATION: 5118 S
MDC_IDC_EPISODE_DURATION: 544 S
MDC_IDC_EPISODE_DURATION: 555 S
MDC_IDC_EPISODE_DURATION: 557 S
MDC_IDC_EPISODE_DURATION: 562 S
MDC_IDC_EPISODE_DURATION: 580 S
MDC_IDC_EPISODE_DURATION: 60 S
MDC_IDC_EPISODE_DURATION: 61 S
MDC_IDC_EPISODE_DURATION: 624 S
MDC_IDC_EPISODE_DURATION: 66 S
MDC_IDC_EPISODE_DURATION: 68 S
MDC_IDC_EPISODE_DURATION: 72 S
MDC_IDC_EPISODE_DURATION: 810 S
MDC_IDC_EPISODE_DURATION: 887 S
MDC_IDC_EPISODE_DURATION: 89 S
MDC_IDC_EPISODE_DURATION: 9 S
MDC_IDC_EPISODE_DURATION: 991 S
MDC_IDC_EPISODE_DURATION: NORMAL S
MDC_IDC_EPISODE_DURATION: NORMAL S
MDC_IDC_EPISODE_ID: 1000
MDC_IDC_EPISODE_ID: 1001
MDC_IDC_EPISODE_ID: 1002
MDC_IDC_EPISODE_ID: 1003
MDC_IDC_EPISODE_ID: 1004
MDC_IDC_EPISODE_ID: 1005
MDC_IDC_EPISODE_ID: 1006
MDC_IDC_EPISODE_ID: 1007
MDC_IDC_EPISODE_ID: 1008
MDC_IDC_EPISODE_ID: 1009
MDC_IDC_EPISODE_ID: 1010
MDC_IDC_EPISODE_ID: 1011
MDC_IDC_EPISODE_ID: 1012
MDC_IDC_EPISODE_ID: 1013
MDC_IDC_EPISODE_ID: 1014
MDC_IDC_EPISODE_ID: 1015
MDC_IDC_EPISODE_ID: 1016
MDC_IDC_EPISODE_ID: 1017
MDC_IDC_EPISODE_ID: 1018
MDC_IDC_EPISODE_ID: 1019
MDC_IDC_EPISODE_ID: 1020
MDC_IDC_EPISODE_ID: 1021
MDC_IDC_EPISODE_ID: 1022
MDC_IDC_EPISODE_ID: 1023
MDC_IDC_EPISODE_ID: 1024
MDC_IDC_EPISODE_ID: 1025
MDC_IDC_EPISODE_ID: 1026
MDC_IDC_EPISODE_ID: 1027
MDC_IDC_EPISODE_ID: 1028
MDC_IDC_EPISODE_ID: 1029
MDC_IDC_EPISODE_ID: 1030
MDC_IDC_EPISODE_ID: 1031
MDC_IDC_EPISODE_ID: 1032
MDC_IDC_EPISODE_ID: 1033
MDC_IDC_EPISODE_ID: 1034
MDC_IDC_EPISODE_ID: 1035
MDC_IDC_EPISODE_ID: 1036
MDC_IDC_EPISODE_ID: 1037
MDC_IDC_EPISODE_ID: 1038
MDC_IDC_EPISODE_ID: 1039
MDC_IDC_EPISODE_ID: 1040
MDC_IDC_EPISODE_ID: 1041
MDC_IDC_EPISODE_ID: 1042
MDC_IDC_EPISODE_ID: 1043
MDC_IDC_EPISODE_ID: 1044
MDC_IDC_EPISODE_ID: 1045
MDC_IDC_EPISODE_ID: 1046
MDC_IDC_EPISODE_ID: 1047
MDC_IDC_EPISODE_ID: 1048
MDC_IDC_EPISODE_ID: 1049
MDC_IDC_EPISODE_ID: 1050
MDC_IDC_EPISODE_ID: 1051
MDC_IDC_EPISODE_ID: 1052
MDC_IDC_EPISODE_ID: 1053
MDC_IDC_EPISODE_ID: 783
MDC_IDC_EPISODE_ID: 806
MDC_IDC_EPISODE_ID: 816
MDC_IDC_EPISODE_ID: 830
MDC_IDC_EPISODE_ID: 841
MDC_IDC_EPISODE_ID: 849
MDC_IDC_EPISODE_ID: 850
MDC_IDC_EPISODE_ID: 853
MDC_IDC_EPISODE_ID: 886
MDC_IDC_EPISODE_ID: 892
MDC_IDC_EPISODE_ID: 904
MDC_IDC_EPISODE_ID: 915
MDC_IDC_EPISODE_ID: 921
MDC_IDC_EPISODE_ID: 929
MDC_IDC_EPISODE_ID: 939
MDC_IDC_EPISODE_ID: 941
MDC_IDC_EPISODE_ID: 953
MDC_IDC_EPISODE_ID: 954
MDC_IDC_EPISODE_ID: 960
MDC_IDC_EPISODE_ID: 962
MDC_IDC_EPISODE_ID: 963
MDC_IDC_EPISODE_ID: 966
MDC_IDC_EPISODE_ID: 981
MDC_IDC_EPISODE_ID: 982
MDC_IDC_EPISODE_ID: 983
MDC_IDC_EPISODE_ID: 984
MDC_IDC_EPISODE_ID: 987
MDC_IDC_EPISODE_ID: 989
MDC_IDC_EPISODE_ID: 990
MDC_IDC_EPISODE_ID: 991
MDC_IDC_EPISODE_ID: 992
MDC_IDC_EPISODE_ID: 993
MDC_IDC_EPISODE_ID: 994
MDC_IDC_EPISODE_ID: 995
MDC_IDC_EPISODE_ID: 996
MDC_IDC_EPISODE_ID: 997
MDC_IDC_EPISODE_ID: 998
MDC_IDC_EPISODE_ID: 999
MDC_IDC_EPISODE_TYPE: NORMAL
MDC_IDC_LEAD_IMPLANT_DT: NORMAL
MDC_IDC_LEAD_IMPLANT_DT: NORMAL
MDC_IDC_LEAD_LOCATION: NORMAL
MDC_IDC_LEAD_LOCATION: NORMAL
MDC_IDC_LEAD_LOCATION_DETAIL_1: NORMAL
MDC_IDC_LEAD_LOCATION_DETAIL_1: NORMAL
MDC_IDC_LEAD_MFG: NORMAL
MDC_IDC_LEAD_MFG: NORMAL
MDC_IDC_LEAD_MODEL: NORMAL
MDC_IDC_LEAD_MODEL: NORMAL
MDC_IDC_LEAD_POLARITY_TYPE: NORMAL
MDC_IDC_LEAD_POLARITY_TYPE: NORMAL
MDC_IDC_LEAD_SERIAL: NORMAL
MDC_IDC_LEAD_SERIAL: NORMAL
MDC_IDC_LEAD_SPECIAL_FUNCTION: 69
MDC_IDC_MSMT_BATTERY_DTM: NORMAL
MDC_IDC_MSMT_BATTERY_REMAINING_LONGEVITY: 151 MO
MDC_IDC_MSMT_BATTERY_RRT_TRIGGER: 2.62
MDC_IDC_MSMT_BATTERY_STATUS: NORMAL
MDC_IDC_MSMT_BATTERY_VOLTAGE: 3.1 V
MDC_IDC_MSMT_LEADCHNL_RA_IMPEDANCE_VALUE: 304 OHM
MDC_IDC_MSMT_LEADCHNL_RA_IMPEDANCE_VALUE: 399 OHM
MDC_IDC_MSMT_LEADCHNL_RA_PACING_THRESHOLD_AMPLITUDE: 0.62 V
MDC_IDC_MSMT_LEADCHNL_RA_PACING_THRESHOLD_PULSEWIDTH: 0.4 MS
MDC_IDC_MSMT_LEADCHNL_RA_SENSING_INTR_AMPL: 2.88 MV
MDC_IDC_MSMT_LEADCHNL_RA_SENSING_INTR_AMPL: 2.88 MV
MDC_IDC_MSMT_LEADCHNL_RV_IMPEDANCE_VALUE: 380 OHM
MDC_IDC_MSMT_LEADCHNL_RV_IMPEDANCE_VALUE: 551 OHM
MDC_IDC_MSMT_LEADCHNL_RV_PACING_THRESHOLD_AMPLITUDE: 1 V
MDC_IDC_MSMT_LEADCHNL_RV_PACING_THRESHOLD_PULSEWIDTH: 0.4 MS
MDC_IDC_MSMT_LEADCHNL_RV_SENSING_INTR_AMPL: 24.88 MV
MDC_IDC_MSMT_LEADCHNL_RV_SENSING_INTR_AMPL: 24.88 MV
MDC_IDC_PG_IMPLANT_DTM: NORMAL
MDC_IDC_PG_MFG: NORMAL
MDC_IDC_PG_MODEL: NORMAL
MDC_IDC_PG_SERIAL: NORMAL
MDC_IDC_PG_TYPE: NORMAL
MDC_IDC_SESS_CLINIC_NAME: NORMAL
MDC_IDC_SESS_DTM: NORMAL
MDC_IDC_SESS_TYPE: NORMAL
MDC_IDC_SET_BRADY_AT_MODE_SWITCH_RATE: 171 {BEATS}/MIN
MDC_IDC_SET_BRADY_HYSTRATE: NORMAL
MDC_IDC_SET_BRADY_LOWRATE: 60 {BEATS}/MIN
MDC_IDC_SET_BRADY_MAX_SENSOR_RATE: 130 {BEATS}/MIN
MDC_IDC_SET_BRADY_MAX_TRACKING_RATE: 130 {BEATS}/MIN
MDC_IDC_SET_BRADY_MODE: NORMAL
MDC_IDC_SET_BRADY_PAV_DELAY_LOW: 180 MS
MDC_IDC_SET_BRADY_SAV_DELAY_LOW: 150 MS
MDC_IDC_SET_LEADCHNL_RA_PACING_AMPLITUDE: 1.5 V
MDC_IDC_SET_LEADCHNL_RA_PACING_ANODE_ELECTRODE_1: NORMAL
MDC_IDC_SET_LEADCHNL_RA_PACING_ANODE_LOCATION_1: NORMAL
MDC_IDC_SET_LEADCHNL_RA_PACING_CAPTURE_MODE: NORMAL
MDC_IDC_SET_LEADCHNL_RA_PACING_CATHODE_ELECTRODE_1: NORMAL
MDC_IDC_SET_LEADCHNL_RA_PACING_CATHODE_LOCATION_1: NORMAL
MDC_IDC_SET_LEADCHNL_RA_PACING_POLARITY: NORMAL
MDC_IDC_SET_LEADCHNL_RA_PACING_PULSEWIDTH: 0.4 MS
MDC_IDC_SET_LEADCHNL_RA_SENSING_ANODE_ELECTRODE_1: NORMAL
MDC_IDC_SET_LEADCHNL_RA_SENSING_ANODE_LOCATION_1: NORMAL
MDC_IDC_SET_LEADCHNL_RA_SENSING_CATHODE_ELECTRODE_1: NORMAL
MDC_IDC_SET_LEADCHNL_RA_SENSING_CATHODE_LOCATION_1: NORMAL
MDC_IDC_SET_LEADCHNL_RA_SENSING_POLARITY: NORMAL
MDC_IDC_SET_LEADCHNL_RA_SENSING_SENSITIVITY: 0.15 MV
MDC_IDC_SET_LEADCHNL_RV_PACING_AMPLITUDE: 1.5 V
MDC_IDC_SET_LEADCHNL_RV_PACING_ANODE_ELECTRODE_1: NORMAL
MDC_IDC_SET_LEADCHNL_RV_PACING_ANODE_LOCATION_1: NORMAL
MDC_IDC_SET_LEADCHNL_RV_PACING_CAPTURE_MODE: NORMAL
MDC_IDC_SET_LEADCHNL_RV_PACING_CATHODE_ELECTRODE_1: NORMAL
MDC_IDC_SET_LEADCHNL_RV_PACING_CATHODE_LOCATION_1: NORMAL
MDC_IDC_SET_LEADCHNL_RV_PACING_POLARITY: NORMAL
MDC_IDC_SET_LEADCHNL_RV_PACING_PULSEWIDTH: 0.4 MS
MDC_IDC_SET_LEADCHNL_RV_SENSING_ANODE_ELECTRODE_1: NORMAL
MDC_IDC_SET_LEADCHNL_RV_SENSING_ANODE_LOCATION_1: NORMAL
MDC_IDC_SET_LEADCHNL_RV_SENSING_CATHODE_ELECTRODE_1: NORMAL
MDC_IDC_SET_LEADCHNL_RV_SENSING_CATHODE_LOCATION_1: NORMAL
MDC_IDC_SET_LEADCHNL_RV_SENSING_POLARITY: NORMAL
MDC_IDC_SET_LEADCHNL_RV_SENSING_SENSITIVITY: 0.9 MV
MDC_IDC_SET_ZONE_DETECTION_INTERVAL: 200 MS
MDC_IDC_SET_ZONE_DETECTION_INTERVAL: 350 MS
MDC_IDC_SET_ZONE_DETECTION_INTERVAL: 400 MS
MDC_IDC_SET_ZONE_TYPE: NORMAL
MDC_IDC_STAT_AT_BURDEN_PERCENT: 5.1 %
MDC_IDC_STAT_AT_DTM_END: NORMAL
MDC_IDC_STAT_AT_DTM_START: NORMAL
MDC_IDC_STAT_BRADY_AP_VP_PERCENT: 0.05 %
MDC_IDC_STAT_BRADY_AP_VS_PERCENT: 99.81 %
MDC_IDC_STAT_BRADY_AS_VP_PERCENT: 0 %
MDC_IDC_STAT_BRADY_AS_VS_PERCENT: 0.37 %
MDC_IDC_STAT_BRADY_DTM_END: NORMAL
MDC_IDC_STAT_BRADY_DTM_START: NORMAL
MDC_IDC_STAT_BRADY_RA_PERCENT_PACED: 94.81 %
MDC_IDC_STAT_BRADY_RV_PERCENT_PACED: 0.29 %
MDC_IDC_STAT_EPISODE_RECENT_COUNT: 0
MDC_IDC_STAT_EPISODE_RECENT_COUNT: 0
MDC_IDC_STAT_EPISODE_RECENT_COUNT: 235
MDC_IDC_STAT_EPISODE_RECENT_COUNT: 28
MDC_IDC_STAT_EPISODE_RECENT_COUNT: 7
MDC_IDC_STAT_EPISODE_RECENT_COUNT_DTM_END: NORMAL
MDC_IDC_STAT_EPISODE_RECENT_COUNT_DTM_START: NORMAL
MDC_IDC_STAT_EPISODE_TOTAL_COUNT: 0
MDC_IDC_STAT_EPISODE_TOTAL_COUNT: 0
MDC_IDC_STAT_EPISODE_TOTAL_COUNT: 13
MDC_IDC_STAT_EPISODE_TOTAL_COUNT: 46
MDC_IDC_STAT_EPISODE_TOTAL_COUNT: 993
MDC_IDC_STAT_EPISODE_TOTAL_COUNT_DTM_END: NORMAL
MDC_IDC_STAT_EPISODE_TOTAL_COUNT_DTM_START: NORMAL
MDC_IDC_STAT_EPISODE_TYPE: NORMAL

## 2022-01-06 PROCEDURE — 93296 REM INTERROG EVL PM/IDS: CPT | Performed by: INTERNAL MEDICINE

## 2022-01-06 PROCEDURE — 93294 REM INTERROG EVL PM/LDLS PM: CPT | Performed by: INTERNAL MEDICINE

## 2022-04-18 ENCOUNTER — LAB REQUISITION (OUTPATIENT)
Dept: LAB | Facility: CLINIC | Age: 87
End: 2022-04-18

## 2022-04-18 DIAGNOSIS — E78.5 HYPERLIPIDEMIA, UNSPECIFIED: ICD-10-CM

## 2022-04-18 DIAGNOSIS — I10 ESSENTIAL (PRIMARY) HYPERTENSION: ICD-10-CM

## 2022-04-18 LAB
ANION GAP SERPL CALCULATED.3IONS-SCNC: 10 MMOL/L (ref 5–18)
BUN SERPL-MCNC: 13 MG/DL (ref 8–28)
CALCIUM SERPL-MCNC: 9.4 MG/DL (ref 8.5–10.5)
CHLORIDE BLD-SCNC: 103 MMOL/L (ref 98–107)
CHOLEST SERPL-MCNC: 225 MG/DL
CO2 SERPL-SCNC: 31 MMOL/L (ref 22–31)
CREAT SERPL-MCNC: 0.87 MG/DL (ref 0.7–1.3)
FASTING STATUS PATIENT QL REPORTED: ABNORMAL
GFR SERPL CREATININE-BSD FRML MDRD: 84 ML/MIN/1.73M2
GLUCOSE BLD-MCNC: 102 MG/DL (ref 70–125)
HDLC SERPL-MCNC: 53 MG/DL
LDLC SERPL CALC-MCNC: 138 MG/DL
POTASSIUM BLD-SCNC: 4 MMOL/L (ref 3.5–5)
SODIUM SERPL-SCNC: 144 MMOL/L (ref 136–145)
TRIGL SERPL-MCNC: 169 MG/DL

## 2022-04-18 PROCEDURE — 80048 BASIC METABOLIC PNL TOTAL CA: CPT | Performed by: FAMILY MEDICINE

## 2022-04-18 PROCEDURE — 80061 LIPID PANEL: CPT | Performed by: FAMILY MEDICINE

## 2022-04-20 ENCOUNTER — TELEPHONE (OUTPATIENT)
Dept: CARDIOLOGY | Facility: CLINIC | Age: 87
End: 2022-04-20

## 2022-04-20 ENCOUNTER — ANCILLARY PROCEDURE (OUTPATIENT)
Dept: CARDIOLOGY | Facility: CLINIC | Age: 87
End: 2022-04-20
Attending: INTERNAL MEDICINE
Payer: COMMERCIAL

## 2022-04-20 DIAGNOSIS — I49.5 SICK SINUS SYNDROME (H): ICD-10-CM

## 2022-04-20 DIAGNOSIS — Z95.0 PACEMAKER: ICD-10-CM

## 2022-04-20 LAB
MDC_IDC_EPISODE_DTM: NORMAL
MDC_IDC_EPISODE_DURATION: 0 S
MDC_IDC_EPISODE_DURATION: 1 S
MDC_IDC_EPISODE_DURATION: 1074 S
MDC_IDC_EPISODE_DURATION: 1094 S
MDC_IDC_EPISODE_DURATION: 1108 S
MDC_IDC_EPISODE_DURATION: 1109 S
MDC_IDC_EPISODE_DURATION: 111 S
MDC_IDC_EPISODE_DURATION: 1122 S
MDC_IDC_EPISODE_DURATION: 1169 S
MDC_IDC_EPISODE_DURATION: 1173 S
MDC_IDC_EPISODE_DURATION: 125 S
MDC_IDC_EPISODE_DURATION: 129 S
MDC_IDC_EPISODE_DURATION: 1323 S
MDC_IDC_EPISODE_DURATION: 1395 S
MDC_IDC_EPISODE_DURATION: 1644 S
MDC_IDC_EPISODE_DURATION: 1686 S
MDC_IDC_EPISODE_DURATION: 17 S
MDC_IDC_EPISODE_DURATION: 1733 S
MDC_IDC_EPISODE_DURATION: 19 S
MDC_IDC_EPISODE_DURATION: 19 S
MDC_IDC_EPISODE_DURATION: 1900 S
MDC_IDC_EPISODE_DURATION: 2 S
MDC_IDC_EPISODE_DURATION: 20 S
MDC_IDC_EPISODE_DURATION: 21 S
MDC_IDC_EPISODE_DURATION: 2101 S
MDC_IDC_EPISODE_DURATION: 219 S
MDC_IDC_EPISODE_DURATION: 234 S
MDC_IDC_EPISODE_DURATION: 2372 S
MDC_IDC_EPISODE_DURATION: 24 S
MDC_IDC_EPISODE_DURATION: 244 S
MDC_IDC_EPISODE_DURATION: 258 S
MDC_IDC_EPISODE_DURATION: 26 S
MDC_IDC_EPISODE_DURATION: 27 S
MDC_IDC_EPISODE_DURATION: 27 S
MDC_IDC_EPISODE_DURATION: 292 S
MDC_IDC_EPISODE_DURATION: 31 S
MDC_IDC_EPISODE_DURATION: 3164 S
MDC_IDC_EPISODE_DURATION: 3186 S
MDC_IDC_EPISODE_DURATION: 3194 S
MDC_IDC_EPISODE_DURATION: 32 S
MDC_IDC_EPISODE_DURATION: 32 S
MDC_IDC_EPISODE_DURATION: 325 S
MDC_IDC_EPISODE_DURATION: 3416 S
MDC_IDC_EPISODE_DURATION: 3420 S
MDC_IDC_EPISODE_DURATION: 354 S
MDC_IDC_EPISODE_DURATION: 356 S
MDC_IDC_EPISODE_DURATION: 3576 S
MDC_IDC_EPISODE_DURATION: 36 S
MDC_IDC_EPISODE_DURATION: 3656 S
MDC_IDC_EPISODE_DURATION: 369 S
MDC_IDC_EPISODE_DURATION: 400 S
MDC_IDC_EPISODE_DURATION: 405 S
MDC_IDC_EPISODE_DURATION: 4112 S
MDC_IDC_EPISODE_DURATION: 4113 S
MDC_IDC_EPISODE_DURATION: 454 S
MDC_IDC_EPISODE_DURATION: 481 S
MDC_IDC_EPISODE_DURATION: 50 S
MDC_IDC_EPISODE_DURATION: 52 S
MDC_IDC_EPISODE_DURATION: 555 S
MDC_IDC_EPISODE_DURATION: 558 S
MDC_IDC_EPISODE_DURATION: 575 S
MDC_IDC_EPISODE_DURATION: 6355 S
MDC_IDC_EPISODE_DURATION: 638 S
MDC_IDC_EPISODE_DURATION: 67 S
MDC_IDC_EPISODE_DURATION: 68 S
MDC_IDC_EPISODE_DURATION: 699 S
MDC_IDC_EPISODE_DURATION: 7397 S
MDC_IDC_EPISODE_DURATION: 751 S
MDC_IDC_EPISODE_DURATION: 8623 S
MDC_IDC_EPISODE_DURATION: 8787 S
MDC_IDC_EPISODE_DURATION: 8948 S
MDC_IDC_EPISODE_DURATION: 903 S
MDC_IDC_EPISODE_DURATION: 976 S
MDC_IDC_EPISODE_DURATION: 977 S
MDC_IDC_EPISODE_DURATION: NORMAL S
MDC_IDC_EPISODE_DURATION: NORMAL S
MDC_IDC_EPISODE_ID: 1054
MDC_IDC_EPISODE_ID: 1062
MDC_IDC_EPISODE_ID: 1064
MDC_IDC_EPISODE_ID: 1099
MDC_IDC_EPISODE_ID: 1100
MDC_IDC_EPISODE_ID: 1103
MDC_IDC_EPISODE_ID: 1128
MDC_IDC_EPISODE_ID: 1131
MDC_IDC_EPISODE_ID: 1133
MDC_IDC_EPISODE_ID: 1162
MDC_IDC_EPISODE_ID: 1191
MDC_IDC_EPISODE_ID: 1194
MDC_IDC_EPISODE_ID: 1206
MDC_IDC_EPISODE_ID: 1211
MDC_IDC_EPISODE_ID: 1226
MDC_IDC_EPISODE_ID: 1227
MDC_IDC_EPISODE_ID: 1231
MDC_IDC_EPISODE_ID: 1247
MDC_IDC_EPISODE_ID: 1251
MDC_IDC_EPISODE_ID: 1253
MDC_IDC_EPISODE_ID: 1256
MDC_IDC_EPISODE_ID: 1257
MDC_IDC_EPISODE_ID: 1258
MDC_IDC_EPISODE_ID: 1259
MDC_IDC_EPISODE_ID: 1260
MDC_IDC_EPISODE_ID: 1261
MDC_IDC_EPISODE_ID: 1262
MDC_IDC_EPISODE_ID: 1263
MDC_IDC_EPISODE_ID: 1264
MDC_IDC_EPISODE_ID: 1265
MDC_IDC_EPISODE_ID: 1266
MDC_IDC_EPISODE_ID: 1267
MDC_IDC_EPISODE_ID: 1268
MDC_IDC_EPISODE_ID: 1269
MDC_IDC_EPISODE_ID: 1270
MDC_IDC_EPISODE_ID: 1271
MDC_IDC_EPISODE_ID: 1272
MDC_IDC_EPISODE_ID: 1273
MDC_IDC_EPISODE_ID: 1274
MDC_IDC_EPISODE_ID: 1275
MDC_IDC_EPISODE_ID: 1276
MDC_IDC_EPISODE_ID: 1277
MDC_IDC_EPISODE_ID: 1278
MDC_IDC_EPISODE_ID: 1279
MDC_IDC_EPISODE_ID: 1280
MDC_IDC_EPISODE_ID: 1281
MDC_IDC_EPISODE_ID: 1282
MDC_IDC_EPISODE_ID: 1283
MDC_IDC_EPISODE_ID: 1284
MDC_IDC_EPISODE_ID: 1285
MDC_IDC_EPISODE_ID: 1286
MDC_IDC_EPISODE_ID: 1287
MDC_IDC_EPISODE_ID: 1288
MDC_IDC_EPISODE_ID: 1289
MDC_IDC_EPISODE_ID: 1290
MDC_IDC_EPISODE_ID: 1291
MDC_IDC_EPISODE_ID: 1292
MDC_IDC_EPISODE_ID: 1293
MDC_IDC_EPISODE_ID: 1294
MDC_IDC_EPISODE_ID: 1295
MDC_IDC_EPISODE_ID: 1296
MDC_IDC_EPISODE_ID: 1297
MDC_IDC_EPISODE_ID: 1298
MDC_IDC_EPISODE_ID: 1299
MDC_IDC_EPISODE_ID: 1300
MDC_IDC_EPISODE_ID: 1301
MDC_IDC_EPISODE_ID: 1302
MDC_IDC_EPISODE_ID: 1303
MDC_IDC_EPISODE_ID: 1304
MDC_IDC_EPISODE_ID: 1305
MDC_IDC_EPISODE_ID: 1306
MDC_IDC_EPISODE_ID: 1307
MDC_IDC_EPISODE_ID: 1308
MDC_IDC_EPISODE_ID: 1309
MDC_IDC_EPISODE_ID: 1310
MDC_IDC_EPISODE_ID: 1311
MDC_IDC_EPISODE_ID: 1312
MDC_IDC_EPISODE_ID: 1313
MDC_IDC_EPISODE_ID: 1314
MDC_IDC_EPISODE_ID: 1315
MDC_IDC_EPISODE_ID: 1316
MDC_IDC_EPISODE_ID: 1317
MDC_IDC_EPISODE_ID: 1318
MDC_IDC_EPISODE_ID: 1319
MDC_IDC_EPISODE_ID: 1320
MDC_IDC_EPISODE_ID: 1321
MDC_IDC_EPISODE_ID: 1322
MDC_IDC_EPISODE_ID: 1323
MDC_IDC_EPISODE_ID: 1324
MDC_IDC_EPISODE_ID: 1325
MDC_IDC_EPISODE_TYPE: NORMAL
MDC_IDC_LEAD_IMPLANT_DT: NORMAL
MDC_IDC_LEAD_IMPLANT_DT: NORMAL
MDC_IDC_LEAD_LOCATION: NORMAL
MDC_IDC_LEAD_LOCATION: NORMAL
MDC_IDC_LEAD_LOCATION_DETAIL_1: NORMAL
MDC_IDC_LEAD_LOCATION_DETAIL_1: NORMAL
MDC_IDC_LEAD_MFG: NORMAL
MDC_IDC_LEAD_MFG: NORMAL
MDC_IDC_LEAD_MODEL: NORMAL
MDC_IDC_LEAD_MODEL: NORMAL
MDC_IDC_LEAD_POLARITY_TYPE: NORMAL
MDC_IDC_LEAD_POLARITY_TYPE: NORMAL
MDC_IDC_LEAD_SERIAL: NORMAL
MDC_IDC_LEAD_SERIAL: NORMAL
MDC_IDC_LEAD_SPECIAL_FUNCTION: 69
MDC_IDC_MSMT_BATTERY_DTM: NORMAL
MDC_IDC_MSMT_BATTERY_REMAINING_LONGEVITY: 150 MO
MDC_IDC_MSMT_BATTERY_RRT_TRIGGER: 2.62
MDC_IDC_MSMT_BATTERY_STATUS: NORMAL
MDC_IDC_MSMT_BATTERY_VOLTAGE: 3.05 V
MDC_IDC_MSMT_LEADCHNL_RA_IMPEDANCE_VALUE: 304 OHM
MDC_IDC_MSMT_LEADCHNL_RA_IMPEDANCE_VALUE: 437 OHM
MDC_IDC_MSMT_LEADCHNL_RA_PACING_THRESHOLD_AMPLITUDE: 0.62 V
MDC_IDC_MSMT_LEADCHNL_RA_PACING_THRESHOLD_PULSEWIDTH: 0.4 MS
MDC_IDC_MSMT_LEADCHNL_RA_SENSING_INTR_AMPL: 2.75 MV
MDC_IDC_MSMT_LEADCHNL_RA_SENSING_INTR_AMPL: 2.75 MV
MDC_IDC_MSMT_LEADCHNL_RV_IMPEDANCE_VALUE: 380 OHM
MDC_IDC_MSMT_LEADCHNL_RV_IMPEDANCE_VALUE: 551 OHM
MDC_IDC_MSMT_LEADCHNL_RV_PACING_THRESHOLD_AMPLITUDE: 0.88 V
MDC_IDC_MSMT_LEADCHNL_RV_PACING_THRESHOLD_PULSEWIDTH: 0.4 MS
MDC_IDC_MSMT_LEADCHNL_RV_SENSING_INTR_AMPL: 25.88 MV
MDC_IDC_MSMT_LEADCHNL_RV_SENSING_INTR_AMPL: 25.88 MV
MDC_IDC_PG_IMPLANT_DTM: NORMAL
MDC_IDC_PG_MFG: NORMAL
MDC_IDC_PG_MODEL: NORMAL
MDC_IDC_PG_SERIAL: NORMAL
MDC_IDC_PG_TYPE: NORMAL
MDC_IDC_SESS_CLINIC_NAME: NORMAL
MDC_IDC_SESS_DTM: NORMAL
MDC_IDC_SESS_TYPE: NORMAL
MDC_IDC_SET_BRADY_AT_MODE_SWITCH_RATE: 171 {BEATS}/MIN
MDC_IDC_SET_BRADY_HYSTRATE: NORMAL
MDC_IDC_SET_BRADY_LOWRATE: 60 {BEATS}/MIN
MDC_IDC_SET_BRADY_MAX_SENSOR_RATE: 130 {BEATS}/MIN
MDC_IDC_SET_BRADY_MAX_TRACKING_RATE: 130 {BEATS}/MIN
MDC_IDC_SET_BRADY_MODE: NORMAL
MDC_IDC_SET_BRADY_PAV_DELAY_LOW: 180 MS
MDC_IDC_SET_BRADY_SAV_DELAY_LOW: 150 MS
MDC_IDC_SET_LEADCHNL_RA_PACING_AMPLITUDE: 1.5 V
MDC_IDC_SET_LEADCHNL_RA_PACING_ANODE_ELECTRODE_1: NORMAL
MDC_IDC_SET_LEADCHNL_RA_PACING_ANODE_LOCATION_1: NORMAL
MDC_IDC_SET_LEADCHNL_RA_PACING_CAPTURE_MODE: NORMAL
MDC_IDC_SET_LEADCHNL_RA_PACING_CATHODE_ELECTRODE_1: NORMAL
MDC_IDC_SET_LEADCHNL_RA_PACING_CATHODE_LOCATION_1: NORMAL
MDC_IDC_SET_LEADCHNL_RA_PACING_POLARITY: NORMAL
MDC_IDC_SET_LEADCHNL_RA_PACING_PULSEWIDTH: 0.4 MS
MDC_IDC_SET_LEADCHNL_RA_SENSING_ANODE_ELECTRODE_1: NORMAL
MDC_IDC_SET_LEADCHNL_RA_SENSING_ANODE_LOCATION_1: NORMAL
MDC_IDC_SET_LEADCHNL_RA_SENSING_CATHODE_ELECTRODE_1: NORMAL
MDC_IDC_SET_LEADCHNL_RA_SENSING_CATHODE_LOCATION_1: NORMAL
MDC_IDC_SET_LEADCHNL_RA_SENSING_POLARITY: NORMAL
MDC_IDC_SET_LEADCHNL_RA_SENSING_SENSITIVITY: 0.15 MV
MDC_IDC_SET_LEADCHNL_RV_PACING_AMPLITUDE: 1.5 V
MDC_IDC_SET_LEADCHNL_RV_PACING_ANODE_ELECTRODE_1: NORMAL
MDC_IDC_SET_LEADCHNL_RV_PACING_ANODE_LOCATION_1: NORMAL
MDC_IDC_SET_LEADCHNL_RV_PACING_CAPTURE_MODE: NORMAL
MDC_IDC_SET_LEADCHNL_RV_PACING_CATHODE_ELECTRODE_1: NORMAL
MDC_IDC_SET_LEADCHNL_RV_PACING_CATHODE_LOCATION_1: NORMAL
MDC_IDC_SET_LEADCHNL_RV_PACING_POLARITY: NORMAL
MDC_IDC_SET_LEADCHNL_RV_PACING_PULSEWIDTH: 0.4 MS
MDC_IDC_SET_LEADCHNL_RV_SENSING_ANODE_ELECTRODE_1: NORMAL
MDC_IDC_SET_LEADCHNL_RV_SENSING_ANODE_LOCATION_1: NORMAL
MDC_IDC_SET_LEADCHNL_RV_SENSING_CATHODE_ELECTRODE_1: NORMAL
MDC_IDC_SET_LEADCHNL_RV_SENSING_CATHODE_LOCATION_1: NORMAL
MDC_IDC_SET_LEADCHNL_RV_SENSING_POLARITY: NORMAL
MDC_IDC_SET_LEADCHNL_RV_SENSING_SENSITIVITY: 0.9 MV
MDC_IDC_SET_ZONE_DETECTION_INTERVAL: 200 MS
MDC_IDC_SET_ZONE_DETECTION_INTERVAL: 350 MS
MDC_IDC_SET_ZONE_DETECTION_INTERVAL: 400 MS
MDC_IDC_SET_ZONE_TYPE: NORMAL
MDC_IDC_STAT_AT_BURDEN_PERCENT: 4.9 %
MDC_IDC_STAT_AT_DTM_END: NORMAL
MDC_IDC_STAT_AT_DTM_START: NORMAL
MDC_IDC_STAT_BRADY_AP_VP_PERCENT: 0.05 %
MDC_IDC_STAT_BRADY_AP_VS_PERCENT: 99.1 %
MDC_IDC_STAT_BRADY_AS_VP_PERCENT: 0 %
MDC_IDC_STAT_BRADY_AS_VS_PERCENT: 1.08 %
MDC_IDC_STAT_BRADY_DTM_END: NORMAL
MDC_IDC_STAT_BRADY_DTM_START: NORMAL
MDC_IDC_STAT_BRADY_RA_PERCENT_PACED: 94.34 %
MDC_IDC_STAT_BRADY_RV_PERCENT_PACED: 0.18 %
MDC_IDC_STAT_EPISODE_RECENT_COUNT: 0
MDC_IDC_STAT_EPISODE_RECENT_COUNT: 0
MDC_IDC_STAT_EPISODE_RECENT_COUNT: 220
MDC_IDC_STAT_EPISODE_RECENT_COUNT: 44
MDC_IDC_STAT_EPISODE_RECENT_COUNT: 8
MDC_IDC_STAT_EPISODE_RECENT_COUNT_DTM_END: NORMAL
MDC_IDC_STAT_EPISODE_RECENT_COUNT_DTM_START: NORMAL
MDC_IDC_STAT_EPISODE_TOTAL_COUNT: 0
MDC_IDC_STAT_EPISODE_TOTAL_COUNT: 0
MDC_IDC_STAT_EPISODE_TOTAL_COUNT: 1213
MDC_IDC_STAT_EPISODE_TOTAL_COUNT: 21
MDC_IDC_STAT_EPISODE_TOTAL_COUNT: 90
MDC_IDC_STAT_EPISODE_TOTAL_COUNT_DTM_END: NORMAL
MDC_IDC_STAT_EPISODE_TOTAL_COUNT_DTM_START: NORMAL
MDC_IDC_STAT_EPISODE_TYPE: NORMAL

## 2022-04-20 PROCEDURE — 93296 REM INTERROG EVL PM/IDS: CPT | Performed by: INTERNAL MEDICINE

## 2022-04-20 PROCEDURE — 93294 REM INTERROG EVL PM/LDLS PM: CPT | Performed by: INTERNAL MEDICINE

## 2022-04-20 RX ORDER — METOPROLOL TARTRATE 50 MG
75 TABLET ORAL 2 TIMES DAILY
COMMUNITY
End: 2022-11-03

## 2022-04-20 NOTE — TELEPHONE ENCOUNTER
Patient called back, denies any troublesome symptoms with AF or any awareness of rhythm changes. Advised to call with any changes. Also reviewed medication list, seems our list was inaccurate. Patient had Amiodarone listed but states that medication does not sound familiar (removed from list). Per his pill bottles he takes Lopressor 50 mg three times daily, Diltazem 180 mg daily, and Eliquis 5 mg twice daily.    Patient states he would like to discuss changing medications because he feels the Lopressor makes him too tired. Discussed with patient that he is quite overdue to see Cardiologist last saw Dr. Gonzales in 2017, and also due for annual device check.      Will forward to Device Schedulers to set up appointment with Dr. Gonzales (or Jonathan?)/Device RN for next available. Patient agrees and appreciates this follow up.     Kayla Cochran, RN

## 2022-04-20 NOTE — TELEPHONE ENCOUNTER
Type: routine remote pacemaker transmission.   Presenting rhythm: AP-VS 62 bpm.  Battery/lead status: stable.  Arrhythmias: since 1/5/22; 220 mode switch episodes, longest lasting 5hrs, available EGMs appear to be AF, v-rates >/=120bpm ~65%, AF burden 4.9%. 44 ventricular high rate episodes and 8 fast A&V episodes,   Anticoagulant: Eliquis.   Comments: normal pacemaker function. Routed to device RN for review. NESSA Cantu, Device Specialist      Known PAF with lower burden but V-rates on faster side when in AF. Per EHR med list is on Amiodarone 200 mg three times daily, Diltiazem  mg daily,  Eliquis 5 mg twice daily. and Call placed to patient to review findings, assess for any troublesome symptoms. Appears that patient is due for Annual device check and with Dr. Castillo as well (per recent referral from PMD).     No Answer, LVM for callback.   Kayla Cochran RN

## 2022-05-26 ENCOUNTER — TELEPHONE (OUTPATIENT)
Dept: CARDIOLOGY | Facility: CLINIC | Age: 87
End: 2022-05-26
Payer: COMMERCIAL

## 2022-06-03 DIAGNOSIS — I48.91 ATRIAL FIBRILLATION WITH RVR (H): ICD-10-CM

## 2022-06-07 RX ORDER — DILTIAZEM HYDROCHLORIDE 180 MG/1
CAPSULE, COATED, EXTENDED RELEASE ORAL
Qty: 90 CAPSULE | Refills: 0 | Status: SHIPPED | OUTPATIENT
Start: 2022-06-07 | End: 2022-06-27 | Stop reason: ALTCHOICE

## 2022-06-13 ENCOUNTER — TELEPHONE (OUTPATIENT)
Dept: CARDIOLOGY | Facility: CLINIC | Age: 87
End: 2022-06-13
Payer: COMMERCIAL

## 2022-06-13 NOTE — TELEPHONE ENCOUNTER
Dr. Castillo,  [facility] is requesting patient hold Eliquis for 3 days prior to an injection procedure.  Any new recommendations?  Thank you,  Seble

## 2022-06-15 NOTE — TELEPHONE ENCOUNTER
Esperanza Castillo MD  You 26 minutes ago (7:08 AM)     FR    That would be fine.  Daniel can resume postprocedure.  Thanks

## 2022-06-27 ENCOUNTER — OFFICE VISIT (OUTPATIENT)
Dept: CARDIOLOGY | Facility: CLINIC | Age: 87
End: 2022-06-27
Attending: INTERNAL MEDICINE
Payer: COMMERCIAL

## 2022-06-27 VITALS
HEART RATE: 60 BPM | WEIGHT: 159.2 LBS | DIASTOLIC BLOOD PRESSURE: 82 MMHG | OXYGEN SATURATION: 97 % | SYSTOLIC BLOOD PRESSURE: 144 MMHG | RESPIRATION RATE: 16 BRPM | BODY MASS INDEX: 24.93 KG/M2

## 2022-06-27 DIAGNOSIS — Z95.0 PACEMAKER: ICD-10-CM

## 2022-06-27 DIAGNOSIS — I48.91 ATRIAL FIBRILLATION WITH RVR (H): ICD-10-CM

## 2022-06-27 DIAGNOSIS — I49.5 SICK SINUS SYNDROME (H): ICD-10-CM

## 2022-06-27 DIAGNOSIS — I10 HYPERTENSION, UNSPECIFIED TYPE: ICD-10-CM

## 2022-06-27 DIAGNOSIS — I48.0 PAROXYSMAL ATRIAL FIBRILLATION (H): Primary | ICD-10-CM

## 2022-06-27 PROCEDURE — 93280 PM DEVICE PROGR EVAL DUAL: CPT | Performed by: INTERNAL MEDICINE

## 2022-06-27 PROCEDURE — 99214 OFFICE O/P EST MOD 30 MIN: CPT | Performed by: INTERNAL MEDICINE

## 2022-06-27 RX ORDER — DILTIAZEM HYDROCHLORIDE 240 MG/1
240 CAPSULE, EXTENDED RELEASE ORAL DAILY
Qty: 90 CAPSULE | Refills: 3 | Status: SHIPPED | OUTPATIENT
Start: 2022-06-27 | End: 2022-08-16

## 2022-06-27 NOTE — PROGRESS NOTES
[facility] HEART CARE   [address], [place] [address]   [email or web address]   OFFICE: [phone]          Thank you Pepe Donis for asking the [facility] Heart Care team to participate in the care of your patient, Daniel Sawant.     Impression and Plan     1. Atrial fibrillation (persistent). Patient has history of atrial fibrillation/flutter. This was initially diagnosed in 2015. Patient at one point he had been on sotalol therapy a few years ago though this was discontinued after approximate 6-month duration due to tendency toward bradycardia.      Daniel underwent permanent pacemaker placement 8 February 2021.    Recent device interrogations have revealed low atrial fibrillation burden noted with some tendency toward elevated ventricular response when in atrial fibrillation.  As noted below, he is asymptomatic with the rhythm disturbance when it is present.  Normally would consider increasing his metoprolol, however, he does report increasing somnolence on the higher doses.  Plan:    Increase diltiazem from 180 mg daily to 240 mg daily.    2.  Hypertension.  Blood pressure is mildly elevated in the office today.    Increase diltiazem as per problem #1.    We will tentatively plan on follow-up in 1 year.  Daniel will be followed through the Device Clinic per protocol as well.      35 minutes spent reviewing prior records (including documentation, laboratory studies, cardiac testing/imaging), interview with patient along with physical exam, planning, and subsequent documentation/crafting of note).           History of Present Illness    Once again I would like to thank you again for asking me to participate in the care of your patient, Daniel Sawant.  As you know, but to reiterate for my own records, Daniel Sawant is a 88 year old male with history of persistent atrial fibrillation. This was initially diagnosed in 2015. Daniel at one point he had been on  sotalol therapy a few years ago though this was discontinued after approximate 6-month duration due to tendency toward bradycardia.      Daniel underwent permanent pacemaker placement 8 February 2021.    On interview, Daniel is without cardiovascular complaint. Despite intermittent episodes of atrial fibrillation on device interrogations, he has had no symptoms with these episodes.  He denies chest pain and shortness of breath.    Further review of systems is otherwise negative/noncontributory (medical record and 13 point review of systems reviewed as well and pertinent positives noted).         Cardiac Diagnostics      Echocardiogram 4 February 2021:  1. Normal left ventricular size with low normal left ventricular systolic performance.  Ejection fraction 50%.  2. Moderate tricuspid insufficiency.  3. Moderate right ventricular enlargement with mildly reduced right ventricular systolic performance.  4. Severe bi-atrial enlargement.    Echocardiogram 14 August 2015:  1. Normal left ventricular size and systolic performance with ejection fraction of 50 to 55%.  2. Mild to moderate tricuspid insufficiency.  3. Normal right ventricular size with low normal right ventricular systolic performance.  4. Normal left atrial dimension.  Mild right atrial enlargement.    Device interrogation 20 April 2022 ([place] [id number] Emerita XT DR MRI device implanted 8 February 2021):  1. Type: routine remote pacemaker transmission.  2. Presenting rhythm: AP-VS 62 bpm.  3. Battery/lead status: stable.  4. Arrhythmias: since 5 January 2022; 220 mode switch episodes, longest lasting 5hrs, available EGMs appear to be AF, v-rates >/=120bpm ~65%, AF burden 4.9%. 44 ventricular high rate episodes and 8 fast A&V episodes,  5. Anticoagulant: Apixaban  6. Comments: normal pacemaker function. Routed to device RN for review. NESSA Cantu, Device Specialist.   7. ADD: Low AF burden, but RVR noted with A.fib. Asymptomatic. Overdue for OV with Gen Card.  Message forwarded to scheduling to set up annual device check in tandem with Cardiologist.     Twelve-lead ECG (personally reviewed) 3 February 2021: Atrial fibrillation with heart rate of 165 bpm.  Right bundle branch block with associated T wave changes.           Physical Examination       BP (!) 144/82 (BP Location: Left arm, Patient Position: Sitting, Cuff Size: Adult Regular)   Pulse 60   Resp 16   Wt 72.2 kg (159 lb 3.2 oz)   SpO2 97%   BMI 24.93 kg/m          Wt Readings from Last 3 Encounters:   06/27/22 72.2 kg (159 lb 3.2 oz)   05/05/21 69.9 kg (154 lb)   02/15/21 71.7 kg (158 lb)     The patient is alert and oriented times three. Sclerae are anicteric. Mucosal membranes are moist. Jugular venous pressure is normal. No significant adenopathy/thyromegally appreciated. Lungs are clear with good expansion. On cardiovascular exam, the patient has a regular S1 and S2. Abdomen is soft and non-tender.  Lower extremities  with minimal edema         Medications  Allergies   Current Outpatient Medications   Medication Sig Dispense Refill     apixaban ANTICOAGULANT (ELIQUIS) 5 MG tablet Take 1 tablet (5 mg) by mouth 2 times daily 180 tablet 3     arginine HCl, L-arginine, 500 mg cap [ARGININE HCL, L-ARGININE, 500 MG CAP] Take 500 mg by mouth 2 (two) times a day.        diltiazem ER (DILT-XR) 240 MG 24 hr ER beaded capsule Take 1 capsule (240 mg) by mouth daily 90 capsule 3     DOCOSAHEXANOIC ACID/EPA (FISH OIL ORAL) Take 500 mg by mouth 3 times daily       grape seed extract (GRAPE SEED ORAL) [GRAPE SEED EXTRACT (GRAPE SEED ORAL)] Take 1 tablet by mouth daily.       lactase (LACTAID) 3,000 unit tablet [LACTASE (LACTAID) 3,000 UNIT TABLET] Take 1 tablet (3,000 Units total) by mouth 3 (three) times a day with meals.  0     levOCARNitine (L-CARNITINE) 500 mg Tab [LEVOCARNITINE (L-CARNITINE) 500 MG TAB] Take 500 mg by mouth daily.       metoprolol tartrate (LOPRESSOR) 50 MG tablet Take 50 mg by mouth 2 times daily        multivitamin with minerals (THERA-M) 9 mg iron-400 mcg Tab tablet [MULTIVITAMIN WITH MINERALS (THERA-M) 9 MG IRON-400 MCG TAB TABLET] Take 1 tablet by mouth daily.       UNABLE TO FIND Take 3 tablets by mouth daily       acetaminophen (TYLENOL) 650 MG suppository [ACETAMINOPHEN (TYLENOL) 650 MG SUPPOSITORY] Insert 1 suppository (650 mg total) into the rectum every 4 (four) hours as needed. (Patient not taking: Reported on 6/27/2022)  0     esomeprazole (NEXIUM) 20 MG capsule [ESOMEPRAZOLE (NEXIUM) 20 MG CAPSULE] Take 20 mg by mouth at bedtime. (Patient not taking: Reported on 6/27/2022)       furosemide (LASIX) 40 MG tablet [FUROSEMIDE (LASIX) 40 MG TABLET] Take 1 tablet (40 mg total) by mouth daily. 30 tablet 0     UNABLE TO FIND [UNABLE TO FIND] Take 1 tablet by mouth daily. Med Name: prosvent prostate supplement (Patient not taking: Reported on 6/27/2022)         Allergies   Allergen Reactions     Simvastatin      Other reaction(s): muscle aches          Lab Results    Chemistry/lipid CBC Cardiac Enzymes/BNP/TSH/INR   Recent Labs   Lab Test 04/18/22  0926   CHOL 225*   HDL 53   *   TRIG 169*     Recent Labs   Lab Test 04/18/22  0926 04/11/19  1010 03/29/18  1027   * 135* 102     Recent Labs   Lab Test 04/18/22  0926      POTASSIUM 4.0   CHLORIDE 103   CO2 31      BUN 13   CR 0.87   GFRESTIMATED 84   DARCY 9.4     Recent Labs   Lab Test 04/18/22  0926 02/18/21  1108 02/09/21  0444   CR 0.87 0.88 0.95     No results for input(s): A1C in the last 60558 hours.       Recent Labs   Lab Test 02/09/21  0444 02/07/21  0429   WBC  --  8.8   HGB  --  13.4*   HCT  --  39.9*   MCV  --  94    155     Recent Labs   Lab Test 02/07/21 0429 02/05/21 2241 02/05/21  0339   HGB 13.4* 13.2* 13.1*    Recent Labs   Lab Test 02/05/21 2241 02/05/21  0339 02/03/21  1303   TROPONINI 0.03 0.04 0.04     Recent Labs   Lab Test 02/03/21  1303   *     Recent Labs   Lab Test 02/03/21  1303   TSH 1.71      Recent Labs   Lab Test 02/04/21  0530   INR 1.33*        Medical History  Surgical History Family History Social History   Past Medical History:   Diagnosis Date     Arthritis     bilateral hands     Atrial fibrillation (H)      Atrial flutter (H)      Cancer (H)     skin cancer     Gout      Hypertension      Past Surgical History:   Procedure Laterality Date     BIOPSY SKIN (LOCATION)      to remove skin cancer     CIRCUMCISION       EP PACEMAKER INSERT N/A 2/8/2021    Procedure: EP Pacemaker Insertion;  Surgeon: Bautista Umana MD;  Location: [facility] Cardiac Cath Lab;  Service: Cardiology     HERNIA REPAIR Right 1988    inquinal hernia     TONSILLECTOMY       No family history on file.     Social History     Socioeconomic History     Marital status:      Spouse name: Not on file     Number of children: Not on file     Years of education: Not on file     Highest education level: Not on file   Occupational History     Not on file   Tobacco Use     Smoking status: Never Smoker     Smokeless tobacco: Never Used   Substance and Sexual Activity     Alcohol use: No     Comment: Alcoholic Drinks/day: very little     Drug use: No     Sexual activity: Not on file   Other Topics Concern     Not on file   Social History Narrative     Not on file     Social Determinants of Health     Financial Resource Strain: Not on file   Food Insecurity: Not on file   Transportation Needs: Not on file   Physical Activity: Not on file   Stress: Not on file   Social Connections: Not on file   Intimate Partner Violence: Not on file   Housing Stability: Not on file

## 2022-06-27 NOTE — LETTER
6/27/2022    Pepe Villeda MD  [facility] [address] [place] [address]    RE: Daniel Sawant       Dear Colleague,     I had the pleasure of seeing Daniel Sawant in the [facility] Heart [facility].         [facility] HEART CARE   [address], [place] [address]   [email or web address]   OFFICE: [phone]          Thank you Pepe Donis for asking the [facility] Heart Care team to participate in the care of your patient, Daniel Sawant.     Impression and Plan     1. Atrial fibrillation (persistent). Patient has history of atrial fibrillation/flutter. This was initially diagnosed in 2015. Patient at one point he had been on sotalol therapy a few years ago though this was discontinued after approximate 6-month duration due to tendency toward bradycardia.      Daniel underwent permanent pacemaker placement 8 February 2021.    Recent device interrogations have revealed low atrial fibrillation burden noted with some tendency toward elevated ventricular response when in atrial fibrillation.  As noted below, he is asymptomatic with the rhythm disturbance when it is present.  Normally would consider increasing his metoprolol, however, he does report increasing somnolence on the higher doses.  Plan:    Increase diltiazem from 180 mg daily to 240 mg daily.    2.  Hypertension.  Blood pressure is mildly elevated in the office today.    Increase diltiazem as per problem #1.    We will tentatively plan on follow-up in 1 year.  Daniel will be followed through the Device Clinic per protocol as well.      35 minutes spent reviewing prior records (including documentation, laboratory studies, cardiac testing/imaging), interview with patient along with physical exam, planning, and subsequent documentation/crafting of note).           History of Present Illness    Once again I would like to thank you again for asking me to participate in the care of your  patient, Daniel Sawant.  As you know, but to reiterate for my own records, Daniel Sawant is a 88 year old male with history of persistent atrial fibrillation. This was initially diagnosed in 2015. Daniel at one point he had been on sotalol therapy a few years ago though this was discontinued after approximate 6-month duration due to tendency toward bradycardia.      Daniel underwent permanent pacemaker placement 8 February 2021.    On interview, Daniel is without cardiovascular complaint. Despite intermittent episodes of atrial fibrillation on device interrogations, he has had no symptoms with these episodes.  He denies chest pain and shortness of breath.    Further review of systems is otherwise negative/noncontributory (medical record and 13 point review of systems reviewed as well and pertinent positives noted).         Cardiac Diagnostics      Echocardiogram 4 February 2021:  1. Normal left ventricular size with low normal left ventricular systolic performance.  Ejection fraction 50%.  2. Moderate tricuspid insufficiency.  3. Moderate right ventricular enlargement with mildly reduced right ventricular systolic performance.  4. Severe bi-atrial enlargement.    Echocardiogram 14 August 2015:  1. Normal left ventricular size and systolic performance with ejection fraction of 50 to 55%.  2. Mild to moderate tricuspid insufficiency.  3. Normal right ventricular size with low normal right ventricular systolic performance.  4. Normal left atrial dimension.  Mild right atrial enlargement.    Device interrogation 20 April 2022 ([place] [id number] Emerita XT DR MRI device implanted 8 February 2021):  1. Type: routine remote pacemaker transmission.  2. Presenting rhythm: AP-VS 62 bpm.  3. Battery/lead status: stable.  4. Arrhythmias: since 5 January 2022; 220 mode switch episodes, longest lasting 5hrs, available EGMs appear to be AF, v-rates >/=120bpm ~65%, AF burden 4.9%. 44 ventricular high rate episodes and 8 fast A&V  episodes,  5. Anticoagulant: Apixaban  6. Comments: normal pacemaker function. Routed to device RN for review. NESSA Cantu, Device Specialist.   7. ADD: Low AF burden, but RVR noted with A.fib. Asymptomatic. Overdue for OV with Gen Card. Message forwarded to scheduling to set up annual device check in tandem with Cardiologist.     Twelve-lead ECG (personally reviewed) 3 February 2021: Atrial fibrillation with heart rate of 165 bpm.  Right bundle branch block with associated T wave changes.           Physical Examination       BP (!) 144/82 (BP Location: Left arm, Patient Position: Sitting, Cuff Size: Adult Regular)   Pulse 60   Resp 16   Wt 72.2 kg (159 lb 3.2 oz)   SpO2 97%   BMI 24.93 kg/m          Wt Readings from Last 3 Encounters:   06/27/22 72.2 kg (159 lb 3.2 oz)   05/05/21 69.9 kg (154 lb)   02/15/21 71.7 kg (158 lb)     The patient is alert and oriented times three. Sclerae are anicteric. Mucosal membranes are moist. Jugular venous pressure is normal. No significant adenopathy/thyromegally appreciated. Lungs are clear with good expansion. On cardiovascular exam, the patient has a regular S1 and S2. Abdomen is soft and non-tender.  Lower extremities  with minimal edema         Medications  Allergies   Current Outpatient Medications   Medication Sig Dispense Refill     apixaban ANTICOAGULANT (ELIQUIS) 5 MG tablet Take 1 tablet (5 mg) by mouth 2 times daily 180 tablet 3     arginine HCl, L-arginine, 500 mg cap [ARGININE HCL, L-ARGININE, 500 MG CAP] Take 500 mg by mouth 2 (two) times a day.        diltiazem ER (DILT-XR) 240 MG 24 hr ER beaded capsule Take 1 capsule (240 mg) by mouth daily 90 capsule 3     DOCOSAHEXANOIC ACID/EPA (FISH OIL ORAL) Take 500 mg by mouth 3 times daily       grape seed extract (GRAPE SEED ORAL) [GRAPE SEED EXTRACT (GRAPE SEED ORAL)] Take 1 tablet by mouth daily.       lactase (LACTAID) 3,000 unit tablet [LACTASE (LACTAID) 3,000 UNIT TABLET] Take 1 tablet (3,000 Units total) by mouth  3 (three) times a day with meals.  0     levOCARNitine (L-CARNITINE) 500 mg Tab [LEVOCARNITINE (L-CARNITINE) 500 MG TAB] Take 500 mg by mouth daily.       metoprolol tartrate (LOPRESSOR) 50 MG tablet Take 50 mg by mouth 2 times daily       multivitamin with minerals (THERA-M) 9 mg iron-400 mcg Tab tablet [MULTIVITAMIN WITH MINERALS (THERA-M) 9 MG IRON-400 MCG TAB TABLET] Take 1 tablet by mouth daily.       UNABLE TO FIND Take 3 tablets by mouth daily       acetaminophen (TYLENOL) 650 MG suppository [ACETAMINOPHEN (TYLENOL) 650 MG SUPPOSITORY] Insert 1 suppository (650 mg total) into the rectum every 4 (four) hours as needed. (Patient not taking: Reported on 6/27/2022)  0     esomeprazole (NEXIUM) 20 MG capsule [ESOMEPRAZOLE (NEXIUM) 20 MG CAPSULE] Take 20 mg by mouth at bedtime. (Patient not taking: Reported on 6/27/2022)       furosemide (LASIX) 40 MG tablet [FUROSEMIDE (LASIX) 40 MG TABLET] Take 1 tablet (40 mg total) by mouth daily. 30 tablet 0     UNABLE TO FIND [UNABLE TO FIND] Take 1 tablet by mouth daily. Med Name: prosvent prostate supplement (Patient not taking: Reported on 6/27/2022)         Allergies   Allergen Reactions     Simvastatin      Other reaction(s): muscle aches          Lab Results    Chemistry/lipid CBC Cardiac Enzymes/BNP/TSH/INR   Recent Labs   Lab Test 04/18/22  0926   CHOL 225*   HDL 53   *   TRIG 169*     Recent Labs   Lab Test 04/18/22  0926 04/11/19  1010 03/29/18  1027   * 135* 102     Recent Labs   Lab Test 04/18/22  0926      POTASSIUM 4.0   CHLORIDE 103   CO2 31      BUN 13   CR 0.87   GFRESTIMATED 84   DARCY 9.4     Recent Labs   Lab Test 04/18/22  0926 02/18/21  1108 02/09/21  0444   CR 0.87 0.88 0.95     No results for input(s): A1C in the last 60304 hours.       Recent Labs   Lab Test 02/09/21  0444 02/07/21  0429   WBC  --  8.8   HGB  --  13.4*   HCT  --  39.9*   MCV  --  94    155     Recent Labs   Lab Test 02/07/21  0429 02/05/21  7280  02/05/21  0339   HGB 13.4* 13.2* 13.1*    Recent Labs   Lab Test 02/05/21  2241 02/05/21  0339 02/03/21  1303   TROPONINI 0.03 0.04 0.04     Recent Labs   Lab Test 02/03/21  1303   *     Recent Labs   Lab Test 02/03/21  1303   TSH 1.71     Recent Labs   Lab Test 02/04/21  0530   INR 1.33*        Medical History  Surgical History Family History Social History   Past Medical History:   Diagnosis Date     Arthritis     bilateral hands     Atrial fibrillation (H)      Atrial flutter (H)      Cancer (H)     skin cancer     Gout      Hypertension      Past Surgical History:   Procedure Laterality Date     BIOPSY SKIN (LOCATION)      to remove skin cancer     CIRCUMCISION       EP PACEMAKER INSERT N/A 2/8/2021    Procedure: EP Pacemaker Insertion;  Surgeon: Bautista Umana MD;  Location: [facility] Cardiac Cath Lab;  Service: Cardiology     HERNIA REPAIR Right 1988    inquinal hernia     TONSILLECTOMY       No family history on file.     Social History     Socioeconomic History     Marital status:      Spouse name: Not on file     Number of children: Not on file     Years of education: Not on file     Highest education level: Not on file   Occupational History     Not on file   Tobacco Use     Smoking status: Never Smoker     Smokeless tobacco: Never Used   Substance and Sexual Activity     Alcohol use: No     Comment: Alcoholic Drinks/day: very little     Drug use: No     Sexual activity: Not on file   Other Topics Concern     Not on file   Social History Narrative     Not on file     Social Determinants of Health     Financial Resource Strain: Not on file   Food Insecurity: Not on file   Transportation Needs: Not on file   Physical Activity: Not on file   Stress: Not on file   Social Connections: Not on file   Intimate Partner Violence: Not on file   Housing Stability: Not on file                      Thank you for allowing me to participate in the care of your patient.      Sincerely,     Esperanza ALVARADO  MD Jonathan     [facility]  cc:   Adolph Barrientos MD  [address]  [facility] HEART [facility] [place] [address]

## 2022-06-28 LAB
MDC_IDC_EPISODE_DTM: NORMAL
MDC_IDC_EPISODE_DURATION: 0 S
MDC_IDC_EPISODE_DURATION: 1 S
MDC_IDC_EPISODE_DURATION: 1016 S
MDC_IDC_EPISODE_DURATION: 111 S
MDC_IDC_EPISODE_DURATION: 1126 S
MDC_IDC_EPISODE_DURATION: 1421 S
MDC_IDC_EPISODE_DURATION: 1482 S
MDC_IDC_EPISODE_DURATION: 1572 S
MDC_IDC_EPISODE_DURATION: 159 S
MDC_IDC_EPISODE_DURATION: 1678 S
MDC_IDC_EPISODE_DURATION: 1691 S
MDC_IDC_EPISODE_DURATION: 17 S
MDC_IDC_EPISODE_DURATION: 1746 S
MDC_IDC_EPISODE_DURATION: 1913 S
MDC_IDC_EPISODE_DURATION: 2 S
MDC_IDC_EPISODE_DURATION: 2020 S
MDC_IDC_EPISODE_DURATION: 203 S
MDC_IDC_EPISODE_DURATION: 216 S
MDC_IDC_EPISODE_DURATION: 2192 S
MDC_IDC_EPISODE_DURATION: 2193 S
MDC_IDC_EPISODE_DURATION: 22 S
MDC_IDC_EPISODE_DURATION: 22 S
MDC_IDC_EPISODE_DURATION: 24 S
MDC_IDC_EPISODE_DURATION: 25 S
MDC_IDC_EPISODE_DURATION: 2593 S
MDC_IDC_EPISODE_DURATION: 266 S
MDC_IDC_EPISODE_DURATION: 266 S
MDC_IDC_EPISODE_DURATION: 27 S
MDC_IDC_EPISODE_DURATION: 2764 S
MDC_IDC_EPISODE_DURATION: 2786 S
MDC_IDC_EPISODE_DURATION: 28 S
MDC_IDC_EPISODE_DURATION: 2949 S
MDC_IDC_EPISODE_DURATION: 31 S
MDC_IDC_EPISODE_DURATION: 3138 S
MDC_IDC_EPISODE_DURATION: 324 S
MDC_IDC_EPISODE_DURATION: 3422 S
MDC_IDC_EPISODE_DURATION: 346 S
MDC_IDC_EPISODE_DURATION: 35 S
MDC_IDC_EPISODE_DURATION: 350 S
MDC_IDC_EPISODE_DURATION: 3574 S
MDC_IDC_EPISODE_DURATION: 36 S
MDC_IDC_EPISODE_DURATION: 3636 S
MDC_IDC_EPISODE_DURATION: 3714 S
MDC_IDC_EPISODE_DURATION: 433 S
MDC_IDC_EPISODE_DURATION: 44 S
MDC_IDC_EPISODE_DURATION: 45 S
MDC_IDC_EPISODE_DURATION: 4544 S
MDC_IDC_EPISODE_DURATION: 46 S
MDC_IDC_EPISODE_DURATION: 4819 S
MDC_IDC_EPISODE_DURATION: 492 S
MDC_IDC_EPISODE_DURATION: 4963 S
MDC_IDC_EPISODE_DURATION: 51 S
MDC_IDC_EPISODE_DURATION: 520 S
MDC_IDC_EPISODE_DURATION: 5493 S
MDC_IDC_EPISODE_DURATION: 555 S
MDC_IDC_EPISODE_DURATION: 5664 S
MDC_IDC_EPISODE_DURATION: 57 S
MDC_IDC_EPISODE_DURATION: 60 S
MDC_IDC_EPISODE_DURATION: 612 S
MDC_IDC_EPISODE_DURATION: 62 S
MDC_IDC_EPISODE_DURATION: 65 S
MDC_IDC_EPISODE_DURATION: 66 S
MDC_IDC_EPISODE_DURATION: 71 S
MDC_IDC_EPISODE_DURATION: 72 S
MDC_IDC_EPISODE_DURATION: 748 S
MDC_IDC_EPISODE_DURATION: 75 S
MDC_IDC_EPISODE_DURATION: 780 S
MDC_IDC_EPISODE_DURATION: 782 S
MDC_IDC_EPISODE_DURATION: 795 S
MDC_IDC_EPISODE_DURATION: 797 S
MDC_IDC_EPISODE_DURATION: 798 S
MDC_IDC_EPISODE_DURATION: 81 S
MDC_IDC_EPISODE_DURATION: 821 S
MDC_IDC_EPISODE_DURATION: 8386 S
MDC_IDC_EPISODE_DURATION: 850 S
MDC_IDC_EPISODE_DURATION: 918 S
MDC_IDC_EPISODE_DURATION: 92 S
MDC_IDC_EPISODE_DURATION: 9305 S
MDC_IDC_EPISODE_DURATION: 932 S
MDC_IDC_EPISODE_DURATION: 942 S
MDC_IDC_EPISODE_DURATION: 962 S
MDC_IDC_EPISODE_DURATION: NORMAL S
MDC_IDC_EPISODE_ID: 1330
MDC_IDC_EPISODE_ID: 1336
MDC_IDC_EPISODE_ID: 1345
MDC_IDC_EPISODE_ID: 1347
MDC_IDC_EPISODE_ID: 1357
MDC_IDC_EPISODE_ID: 1363
MDC_IDC_EPISODE_ID: 1370
MDC_IDC_EPISODE_ID: 1394
MDC_IDC_EPISODE_ID: 1402
MDC_IDC_EPISODE_ID: 1430
MDC_IDC_EPISODE_ID: 1436
MDC_IDC_EPISODE_ID: 1440
MDC_IDC_EPISODE_ID: 1442
MDC_IDC_EPISODE_ID: 1444
MDC_IDC_EPISODE_ID: 1447
MDC_IDC_EPISODE_ID: 1448
MDC_IDC_EPISODE_ID: 1453
MDC_IDC_EPISODE_ID: 1455
MDC_IDC_EPISODE_ID: 1457
MDC_IDC_EPISODE_ID: 1459
MDC_IDC_EPISODE_ID: 1460
MDC_IDC_EPISODE_ID: 1461
MDC_IDC_EPISODE_ID: 1463
MDC_IDC_EPISODE_ID: 1466
MDC_IDC_EPISODE_ID: 1469
MDC_IDC_EPISODE_ID: 1471
MDC_IDC_EPISODE_ID: 1476
MDC_IDC_EPISODE_ID: 1477
MDC_IDC_EPISODE_ID: 1478
MDC_IDC_EPISODE_ID: 1479
MDC_IDC_EPISODE_ID: 1480
MDC_IDC_EPISODE_ID: 1481
MDC_IDC_EPISODE_ID: 1482
MDC_IDC_EPISODE_ID: 1483
MDC_IDC_EPISODE_ID: 1484
MDC_IDC_EPISODE_ID: 1485
MDC_IDC_EPISODE_ID: 1486
MDC_IDC_EPISODE_ID: 1487
MDC_IDC_EPISODE_ID: 1488
MDC_IDC_EPISODE_ID: 1489
MDC_IDC_EPISODE_ID: 1490
MDC_IDC_EPISODE_ID: 1491
MDC_IDC_EPISODE_ID: 1492
MDC_IDC_EPISODE_ID: 1493
MDC_IDC_EPISODE_ID: 1494
MDC_IDC_EPISODE_ID: 1495
MDC_IDC_EPISODE_ID: 1496
MDC_IDC_EPISODE_ID: 1497
MDC_IDC_EPISODE_ID: 1498
MDC_IDC_EPISODE_ID: 1499
MDC_IDC_EPISODE_ID: 1500
MDC_IDC_EPISODE_ID: 1501
MDC_IDC_EPISODE_ID: 1502
MDC_IDC_EPISODE_ID: 1503
MDC_IDC_EPISODE_ID: 1504
MDC_IDC_EPISODE_ID: 1505
MDC_IDC_EPISODE_ID: 1506
MDC_IDC_EPISODE_ID: 1507
MDC_IDC_EPISODE_ID: 1508
MDC_IDC_EPISODE_ID: 1509
MDC_IDC_EPISODE_ID: 1510
MDC_IDC_EPISODE_ID: 1511
MDC_IDC_EPISODE_ID: 1512
MDC_IDC_EPISODE_ID: 1513
MDC_IDC_EPISODE_ID: 1514
MDC_IDC_EPISODE_ID: 1515
MDC_IDC_EPISODE_ID: 1516
MDC_IDC_EPISODE_ID: 1517
MDC_IDC_EPISODE_ID: 1518
MDC_IDC_EPISODE_ID: 1519
MDC_IDC_EPISODE_ID: 1520
MDC_IDC_EPISODE_ID: 1521
MDC_IDC_EPISODE_ID: 1522
MDC_IDC_EPISODE_ID: 1523
MDC_IDC_EPISODE_ID: 1524
MDC_IDC_EPISODE_ID: 1525
MDC_IDC_EPISODE_ID: 1526
MDC_IDC_EPISODE_ID: 1527
MDC_IDC_EPISODE_ID: 1528
MDC_IDC_EPISODE_ID: 1529
MDC_IDC_EPISODE_ID: 1530
MDC_IDC_EPISODE_ID: 1531
MDC_IDC_EPISODE_ID: 1532
MDC_IDC_EPISODE_ID: 1533
MDC_IDC_EPISODE_ID: 1534
MDC_IDC_EPISODE_ID: 1535
MDC_IDC_EPISODE_ID: 1536
MDC_IDC_EPISODE_ID: 1537
MDC_IDC_EPISODE_ID: 1538
MDC_IDC_EPISODE_ID: 1539
MDC_IDC_EPISODE_ID: 1540
MDC_IDC_EPISODE_ID: 1541
MDC_IDC_EPISODE_ID: 1542
MDC_IDC_EPISODE_ID: 1543
MDC_IDC_EPISODE_ID: 1544
MDC_IDC_EPISODE_ID: 1545
MDC_IDC_EPISODE_ID: 1546
MDC_IDC_EPISODE_ID: 1547
MDC_IDC_EPISODE_TYPE: NORMAL
MDC_IDC_LEAD_IMPLANT_DT: NORMAL
MDC_IDC_LEAD_IMPLANT_DT: NORMAL
MDC_IDC_LEAD_LOCATION: NORMAL
MDC_IDC_LEAD_LOCATION: NORMAL
MDC_IDC_LEAD_LOCATION_DETAIL_1: NORMAL
MDC_IDC_LEAD_LOCATION_DETAIL_1: NORMAL
MDC_IDC_LEAD_MFG: NORMAL
MDC_IDC_LEAD_MFG: NORMAL
MDC_IDC_LEAD_MODEL: NORMAL
MDC_IDC_LEAD_MODEL: NORMAL
MDC_IDC_LEAD_POLARITY_TYPE: NORMAL
MDC_IDC_LEAD_POLARITY_TYPE: NORMAL
MDC_IDC_LEAD_SERIAL: NORMAL
MDC_IDC_LEAD_SERIAL: NORMAL
MDC_IDC_LEAD_SPECIAL_FUNCTION: 69
MDC_IDC_MSMT_BATTERY_DTM: NORMAL
MDC_IDC_MSMT_BATTERY_REMAINING_LONGEVITY: 151 MO
MDC_IDC_MSMT_BATTERY_RRT_TRIGGER: 2.62
MDC_IDC_MSMT_BATTERY_STATUS: NORMAL
MDC_IDC_MSMT_BATTERY_VOLTAGE: 3.04 V
MDC_IDC_MSMT_LEADCHNL_RA_IMPEDANCE_VALUE: 323 OHM
MDC_IDC_MSMT_LEADCHNL_RA_IMPEDANCE_VALUE: 532 OHM
MDC_IDC_MSMT_LEADCHNL_RA_PACING_THRESHOLD_AMPLITUDE: 0.62 V
MDC_IDC_MSMT_LEADCHNL_RA_PACING_THRESHOLD_AMPLITUDE: 0.75 V
MDC_IDC_MSMT_LEADCHNL_RA_PACING_THRESHOLD_PULSEWIDTH: 0.4 MS
MDC_IDC_MSMT_LEADCHNL_RA_PACING_THRESHOLD_PULSEWIDTH: 0.4 MS
MDC_IDC_MSMT_LEADCHNL_RA_SENSING_INTR_AMPL: 2.12 MV
MDC_IDC_MSMT_LEADCHNL_RA_SENSING_INTR_AMPL: 2.12 MV
MDC_IDC_MSMT_LEADCHNL_RV_IMPEDANCE_VALUE: 380 OHM
MDC_IDC_MSMT_LEADCHNL_RV_IMPEDANCE_VALUE: 589 OHM
MDC_IDC_MSMT_LEADCHNL_RV_PACING_THRESHOLD_AMPLITUDE: 0.88 V
MDC_IDC_MSMT_LEADCHNL_RV_PACING_THRESHOLD_AMPLITUDE: 1 V
MDC_IDC_MSMT_LEADCHNL_RV_PACING_THRESHOLD_PULSEWIDTH: 0.4 MS
MDC_IDC_MSMT_LEADCHNL_RV_PACING_THRESHOLD_PULSEWIDTH: 0.4 MS
MDC_IDC_MSMT_LEADCHNL_RV_SENSING_INTR_AMPL: 24.75 MV
MDC_IDC_MSMT_LEADCHNL_RV_SENSING_INTR_AMPL: 26.88 MV
MDC_IDC_PG_IMPLANT_DTM: NORMAL
MDC_IDC_PG_MFG: NORMAL
MDC_IDC_PG_MODEL: NORMAL
MDC_IDC_PG_SERIAL: NORMAL
MDC_IDC_PG_TYPE: NORMAL
MDC_IDC_SESS_CLINIC_NAME: NORMAL
MDC_IDC_SESS_DTM: NORMAL
MDC_IDC_SESS_TYPE: NORMAL
MDC_IDC_SET_BRADY_AT_MODE_SWITCH_RATE: 171 {BEATS}/MIN
MDC_IDC_SET_BRADY_HYSTRATE: NORMAL
MDC_IDC_SET_BRADY_LOWRATE: 60 {BEATS}/MIN
MDC_IDC_SET_BRADY_MAX_SENSOR_RATE: 130 {BEATS}/MIN
MDC_IDC_SET_BRADY_MAX_TRACKING_RATE: 130 {BEATS}/MIN
MDC_IDC_SET_BRADY_MODE: NORMAL
MDC_IDC_SET_BRADY_PAV_DELAY_LOW: 180 MS
MDC_IDC_SET_BRADY_SAV_DELAY_LOW: 150 MS
MDC_IDC_SET_LEADCHNL_RA_PACING_AMPLITUDE: 1.5 V
MDC_IDC_SET_LEADCHNL_RA_PACING_ANODE_ELECTRODE_1: NORMAL
MDC_IDC_SET_LEADCHNL_RA_PACING_ANODE_LOCATION_1: NORMAL
MDC_IDC_SET_LEADCHNL_RA_PACING_CAPTURE_MODE: NORMAL
MDC_IDC_SET_LEADCHNL_RA_PACING_CATHODE_ELECTRODE_1: NORMAL
MDC_IDC_SET_LEADCHNL_RA_PACING_CATHODE_LOCATION_1: NORMAL
MDC_IDC_SET_LEADCHNL_RA_PACING_POLARITY: NORMAL
MDC_IDC_SET_LEADCHNL_RA_PACING_PULSEWIDTH: 0.4 MS
MDC_IDC_SET_LEADCHNL_RA_SENSING_ANODE_ELECTRODE_1: NORMAL
MDC_IDC_SET_LEADCHNL_RA_SENSING_ANODE_LOCATION_1: NORMAL
MDC_IDC_SET_LEADCHNL_RA_SENSING_CATHODE_ELECTRODE_1: NORMAL
MDC_IDC_SET_LEADCHNL_RA_SENSING_CATHODE_LOCATION_1: NORMAL
MDC_IDC_SET_LEADCHNL_RA_SENSING_POLARITY: NORMAL
MDC_IDC_SET_LEADCHNL_RA_SENSING_SENSITIVITY: 0.15 MV
MDC_IDC_SET_LEADCHNL_RV_PACING_AMPLITUDE: 1.5 V
MDC_IDC_SET_LEADCHNL_RV_PACING_ANODE_ELECTRODE_1: NORMAL
MDC_IDC_SET_LEADCHNL_RV_PACING_ANODE_LOCATION_1: NORMAL
MDC_IDC_SET_LEADCHNL_RV_PACING_CAPTURE_MODE: NORMAL
MDC_IDC_SET_LEADCHNL_RV_PACING_CATHODE_ELECTRODE_1: NORMAL
MDC_IDC_SET_LEADCHNL_RV_PACING_CATHODE_LOCATION_1: NORMAL
MDC_IDC_SET_LEADCHNL_RV_PACING_POLARITY: NORMAL
MDC_IDC_SET_LEADCHNL_RV_PACING_PULSEWIDTH: 0.4 MS
MDC_IDC_SET_LEADCHNL_RV_SENSING_ANODE_ELECTRODE_1: NORMAL
MDC_IDC_SET_LEADCHNL_RV_SENSING_ANODE_LOCATION_1: NORMAL
MDC_IDC_SET_LEADCHNL_RV_SENSING_CATHODE_ELECTRODE_1: NORMAL
MDC_IDC_SET_LEADCHNL_RV_SENSING_CATHODE_LOCATION_1: NORMAL
MDC_IDC_SET_LEADCHNL_RV_SENSING_POLARITY: NORMAL
MDC_IDC_SET_LEADCHNL_RV_SENSING_SENSITIVITY: 0.9 MV
MDC_IDC_SET_ZONE_DETECTION_INTERVAL: 200 MS
MDC_IDC_SET_ZONE_DETECTION_INTERVAL: 350 MS
MDC_IDC_SET_ZONE_DETECTION_INTERVAL: 400 MS
MDC_IDC_SET_ZONE_TYPE: NORMAL
MDC_IDC_STAT_AT_BURDEN_PERCENT: 5.5 %
MDC_IDC_STAT_AT_DTM_END: NORMAL
MDC_IDC_STAT_AT_DTM_START: NORMAL
MDC_IDC_STAT_BRADY_AP_VP_PERCENT: 0.04 %
MDC_IDC_STAT_BRADY_AP_VS_PERCENT: 99.15 %
MDC_IDC_STAT_BRADY_AS_VP_PERCENT: 0 %
MDC_IDC_STAT_BRADY_AS_VS_PERCENT: 1.06 %
MDC_IDC_STAT_BRADY_DTM_END: NORMAL
MDC_IDC_STAT_BRADY_DTM_START: NORMAL
MDC_IDC_STAT_BRADY_RA_PERCENT_PACED: 93.79 %
MDC_IDC_STAT_BRADY_RV_PERCENT_PACED: 0.27 %
MDC_IDC_STAT_EPISODE_RECENT_COUNT: 0
MDC_IDC_STAT_EPISODE_RECENT_COUNT: 110
MDC_IDC_STAT_EPISODE_RECENT_COUNT: 1159
MDC_IDC_STAT_EPISODE_RECENT_COUNT_DTM_END: NORMAL
MDC_IDC_STAT_EPISODE_RECENT_COUNT_DTM_START: NORMAL
MDC_IDC_STAT_EPISODE_TOTAL_COUNT: 0
MDC_IDC_STAT_EPISODE_TOTAL_COUNT: 118
MDC_IDC_STAT_EPISODE_TOTAL_COUNT: 1392
MDC_IDC_STAT_EPISODE_TOTAL_COUNT_DTM_END: NORMAL
MDC_IDC_STAT_EPISODE_TOTAL_COUNT_DTM_START: NORMAL
MDC_IDC_STAT_EPISODE_TYPE: NORMAL

## 2022-07-23 ENCOUNTER — HEALTH MAINTENANCE LETTER (OUTPATIENT)
Age: 87
End: 2022-07-23

## 2022-08-08 ENCOUNTER — TELEPHONE (OUTPATIENT)
Dept: CARDIOLOGY | Facility: CLINIC | Age: 87
End: 2022-08-08

## 2022-08-08 NOTE — TELEPHONE ENCOUNTER
8/8/22 Called patient to see if symptomatic with higher heart rates during Afib.  He denies feeling any symptoms, relates that he never notices his HR going fast.  He feels good and has been active, walks his dog twice a day. Sarah Alvarenga, Device RN.      No charge. No Epic interface required.  Encounter Type: Remtoe alert for Monitored VT episodes.  Device: Medtronic [place] (D) Pacemaker  Pacing % /Programmed: AP 79.6%,  0.7%/ AAIR-DDDR  bpm  Lead(s): stable  Battery longevity: Estimates 12.2 years remaining  Presenting: Afib (AS)/VS, rate 122-230 bpm  Atrial arrhythmia: 53 mode switches, burden 17.5% since 7/14/22. Atrial ATP effective 6/48, 12.5%. VR >= 120 bpm ~ 65-70% of the time while in atrial arrhythmia.   Anticoagulant: Eliquis  Ventricular arrhythmia: 1 Monitored VT episode with duration of 52 minutes, ,  bpm. EGM shows AF/RVR. 6 NSVT episodes for 1 second, -196 bpm.  Device/Lead alerts: None  Comments: Normal device function. Will call patient to see if symptomatic with fast HR, see Epic note. Patient denies symptoms, states he feels fine. He is active and walks his dog twice a day.  Plan: Scheduled remote on 10/4/2022.  Sarah Alvarenga Device RN.

## 2022-08-15 ENCOUNTER — TELEPHONE (OUTPATIENT)
Dept: CARDIOLOGY | Facility: CLINIC | Age: 87
End: 2022-08-15

## 2022-08-15 DIAGNOSIS — I48.0 PAROXYSMAL ATRIAL FIBRILLATION (H): ICD-10-CM

## 2022-08-15 NOTE — TELEPHONE ENCOUNTER
Left detailed message with my direct number to call for recommendations after review of device check.  ----------------------------------------  Esperanza Castillo MD  You; Sarah Alvarenga, RN 33 minutes ago (7:53 AM)     FR Prieto Pruett,   Please see note from Sarah regarding elevated heart rate when in atrial fibrillation though asymptomatic.  Lets have Daniel increase his diltiazem to 360 mg daily (currently what appear to be on 240 mg daily).  We can simply continue to follow with intermittent device interrogations per protocol.  Thanks.

## 2022-08-16 RX ORDER — DILTIAZEM HYDROCHLORIDE 180 MG/1
360 CAPSULE, EXTENDED RELEASE ORAL DAILY
Qty: 180 CAPSULE | Refills: 1 | Status: SHIPPED | OUTPATIENT
Start: 2022-08-16 | End: 2022-10-06

## 2022-08-16 NOTE — TELEPHONE ENCOUNTER
Called patient and reviewed recommendations after review of device check.  Patient verbalized understanding and had his current pill bottle of diltiazem in hand to confirm medication dosing.   New rx for the increased dose of diltiazem sent to patient's preferred pharmacy and reviewed with patient he will need to take 2 (180 mg ) capsules to equal 360 mg dose.   Encouraged patient to call should he experience any new symptoms after initiating increased dose of diltiazem.   Patient verbalized understanding and has no further questions.

## 2022-10-01 ENCOUNTER — HEALTH MAINTENANCE LETTER (OUTPATIENT)
Age: 87
End: 2022-10-01

## 2022-10-04 ENCOUNTER — ANCILLARY PROCEDURE (OUTPATIENT)
Dept: CARDIOLOGY | Facility: CLINIC | Age: 87
End: 2022-10-04
Attending: INTERNAL MEDICINE
Payer: COMMERCIAL

## 2022-10-04 ENCOUNTER — TELEPHONE (OUTPATIENT)
Dept: CARDIOLOGY | Facility: CLINIC | Age: 87
End: 2022-10-04

## 2022-10-04 DIAGNOSIS — I48.0 PAROXYSMAL ATRIAL FIBRILLATION (H): ICD-10-CM

## 2022-10-04 DIAGNOSIS — I49.5 SICK SINUS SYNDROME (H): ICD-10-CM

## 2022-10-04 DIAGNOSIS — Z95.0 PACEMAKER: ICD-10-CM

## 2022-10-04 NOTE — TELEPHONE ENCOUNTER
Encounter Type: Routine remote pacemaker check   Device: Medtronic [place] (D) PPM  Pacing % /Programmed: 83.8% AP 0.5%  in MVP  60/130  Lead(s): Stable  Battery longevity: 12.1 years  Presenting: APVS 60bpm  Atrial arrhythmia: Since 8/5/2022, 62 mode switches for 14.2% of the time, totaling 8 days. VR>/=120~70% of the time. Longest episode logged was 23hrs 17minutes.  30 Fast A+V episodes.   Anticoagulant: Eliquis  Ventricular arrhythmia: 16 VHR detected; 2 EGMs show a brief burst if PAT, all other EGMs show AF with RVR  Device/Lead alerts: Possible early battery depletion  Comments: Normal device function    Left message asking patient to contact Device RN to discuss recent remote interrogation. Diltiazem was increased on 8/16/2022 from 180mg to 360mg daily per Dr. Castillo.

## 2022-10-04 NOTE — TELEPHONE ENCOUNTER
Attempted calling several times - no answer and no VM.  -----------------------------  Sarah Alvarenga RN  You 1 hour ago (10:27 AM)     CH    Please see Dr Castillo's med change.  Thanks!!    Routing comment      Esperanza Castillo MD Helppi, Connie L, RN 1 hour ago (10:17 AM)     Helio Andrade increase his diltiazem to 420 mg daily (currently what appear to be on 360 mg daily).  Perhaps we could then do a remote check in approximately 7 to 10 days to assess the efficacy and control of ventricular response.  Thanks so much!

## 2022-10-04 NOTE — TELEPHONE ENCOUNTER
10/4/22: Patient returned call, denied feeling any fast Heart rates, said he has been feeling fine.  Sarah Alvarenga, Device RN

## 2022-10-05 LAB
MDC_IDC_EPISODE_DTM: NORMAL
MDC_IDC_EPISODE_DURATION: 0 S
MDC_IDC_EPISODE_DURATION: 1 S
MDC_IDC_EPISODE_DURATION: 1109 S
MDC_IDC_EPISODE_DURATION: 115 S
MDC_IDC_EPISODE_DURATION: 116 S
MDC_IDC_EPISODE_DURATION: 141 S
MDC_IDC_EPISODE_DURATION: 1419 S
MDC_IDC_EPISODE_DURATION: 1420 S
MDC_IDC_EPISODE_DURATION: 1514 S
MDC_IDC_EPISODE_DURATION: 158 S
MDC_IDC_EPISODE_DURATION: 1711 S
MDC_IDC_EPISODE_DURATION: 1793 S
MDC_IDC_EPISODE_DURATION: 1845 S
MDC_IDC_EPISODE_DURATION: 1852 S
MDC_IDC_EPISODE_DURATION: 191 S
MDC_IDC_EPISODE_DURATION: 1933 S
MDC_IDC_EPISODE_DURATION: 1981 S
MDC_IDC_EPISODE_DURATION: 2 S
MDC_IDC_EPISODE_DURATION: 23 S
MDC_IDC_EPISODE_DURATION: 241 S
MDC_IDC_EPISODE_DURATION: 243 S
MDC_IDC_EPISODE_DURATION: 252 S
MDC_IDC_EPISODE_DURATION: 266 S
MDC_IDC_EPISODE_DURATION: 2739 S
MDC_IDC_EPISODE_DURATION: 2891 S
MDC_IDC_EPISODE_DURATION: 3082 S
MDC_IDC_EPISODE_DURATION: 3132 S
MDC_IDC_EPISODE_DURATION: 315 S
MDC_IDC_EPISODE_DURATION: 3197 S
MDC_IDC_EPISODE_DURATION: 3243 S
MDC_IDC_EPISODE_DURATION: 33 S
MDC_IDC_EPISODE_DURATION: 330 S
MDC_IDC_EPISODE_DURATION: 3421 S
MDC_IDC_EPISODE_DURATION: 3480 S
MDC_IDC_EPISODE_DURATION: 3491 S
MDC_IDC_EPISODE_DURATION: 3737 S
MDC_IDC_EPISODE_DURATION: 3822 S
MDC_IDC_EPISODE_DURATION: 3981 S
MDC_IDC_EPISODE_DURATION: 40 S
MDC_IDC_EPISODE_DURATION: 419 S
MDC_IDC_EPISODE_DURATION: 419 S
MDC_IDC_EPISODE_DURATION: 451 S
MDC_IDC_EPISODE_DURATION: 457 S
MDC_IDC_EPISODE_DURATION: 4629 S
MDC_IDC_EPISODE_DURATION: 479 S
MDC_IDC_EPISODE_DURATION: 52 S
MDC_IDC_EPISODE_DURATION: 5624 S
MDC_IDC_EPISODE_DURATION: 589 S
MDC_IDC_EPISODE_DURATION: 59 S
MDC_IDC_EPISODE_DURATION: 6151 S
MDC_IDC_EPISODE_DURATION: 6196 S
MDC_IDC_EPISODE_DURATION: 6676 S
MDC_IDC_EPISODE_DURATION: 67 S
MDC_IDC_EPISODE_DURATION: 6716 S
MDC_IDC_EPISODE_DURATION: 70 S
MDC_IDC_EPISODE_DURATION: 720 S
MDC_IDC_EPISODE_DURATION: 75 S
MDC_IDC_EPISODE_DURATION: 7922 S
MDC_IDC_EPISODE_DURATION: 796 S
MDC_IDC_EPISODE_DURATION: 837 S
MDC_IDC_EPISODE_DURATION: 8472 S
MDC_IDC_EPISODE_DURATION: 849 S
MDC_IDC_EPISODE_DURATION: 8637 S
MDC_IDC_EPISODE_DURATION: 883 S
MDC_IDC_EPISODE_DURATION: 9568 S
MDC_IDC_EPISODE_DURATION: 9613 S
MDC_IDC_EPISODE_DURATION: NORMAL S
MDC_IDC_EPISODE_ID: 1669
MDC_IDC_EPISODE_ID: 1670
MDC_IDC_EPISODE_ID: 1672
MDC_IDC_EPISODE_ID: 1673
MDC_IDC_EPISODE_ID: 1674
MDC_IDC_EPISODE_ID: 1675
MDC_IDC_EPISODE_ID: 1676
MDC_IDC_EPISODE_ID: 1677
MDC_IDC_EPISODE_ID: 1678
MDC_IDC_EPISODE_ID: 1679
MDC_IDC_EPISODE_ID: 1680
MDC_IDC_EPISODE_ID: 1681
MDC_IDC_EPISODE_ID: 1682
MDC_IDC_EPISODE_ID: 1683
MDC_IDC_EPISODE_ID: 1684
MDC_IDC_EPISODE_ID: 1685
MDC_IDC_EPISODE_ID: 1686
MDC_IDC_EPISODE_ID: 1687
MDC_IDC_EPISODE_ID: 1688
MDC_IDC_EPISODE_ID: 1689
MDC_IDC_EPISODE_ID: 1690
MDC_IDC_EPISODE_ID: 1691
MDC_IDC_EPISODE_ID: 1692
MDC_IDC_EPISODE_ID: 1693
MDC_IDC_EPISODE_ID: 1694
MDC_IDC_EPISODE_ID: 1695
MDC_IDC_EPISODE_ID: 1696
MDC_IDC_EPISODE_ID: 1697
MDC_IDC_EPISODE_ID: 1698
MDC_IDC_EPISODE_ID: 1699
MDC_IDC_EPISODE_ID: 1700
MDC_IDC_EPISODE_ID: 1701
MDC_IDC_EPISODE_ID: 1702
MDC_IDC_EPISODE_ID: 1703
MDC_IDC_EPISODE_ID: 1704
MDC_IDC_EPISODE_ID: 1705
MDC_IDC_EPISODE_ID: 1706
MDC_IDC_EPISODE_ID: 1707
MDC_IDC_EPISODE_ID: 1708
MDC_IDC_EPISODE_ID: 1709
MDC_IDC_EPISODE_ID: 1710
MDC_IDC_EPISODE_ID: 1711
MDC_IDC_EPISODE_ID: 1712
MDC_IDC_EPISODE_ID: 1713
MDC_IDC_EPISODE_ID: 1714
MDC_IDC_EPISODE_ID: 1715
MDC_IDC_EPISODE_ID: 1716
MDC_IDC_EPISODE_ID: 1717
MDC_IDC_EPISODE_ID: 1718
MDC_IDC_EPISODE_ID: 1719
MDC_IDC_EPISODE_ID: 1720
MDC_IDC_EPISODE_ID: 1721
MDC_IDC_EPISODE_ID: 1722
MDC_IDC_EPISODE_ID: 1723
MDC_IDC_EPISODE_ID: 1724
MDC_IDC_EPISODE_ID: 1725
MDC_IDC_EPISODE_ID: 1726
MDC_IDC_EPISODE_ID: 1727
MDC_IDC_EPISODE_ID: 1728
MDC_IDC_EPISODE_ID: 1729
MDC_IDC_EPISODE_ID: 1730
MDC_IDC_EPISODE_ID: 1731
MDC_IDC_EPISODE_ID: 1732
MDC_IDC_EPISODE_ID: 1733
MDC_IDC_EPISODE_ID: 1734
MDC_IDC_EPISODE_ID: 1735
MDC_IDC_EPISODE_ID: 1736
MDC_IDC_EPISODE_ID: 1737
MDC_IDC_EPISODE_ID: 1738
MDC_IDC_EPISODE_ID: 1739
MDC_IDC_EPISODE_ID: 1740
MDC_IDC_EPISODE_ID: 1741
MDC_IDC_EPISODE_ID: 1742
MDC_IDC_EPISODE_ID: 1743
MDC_IDC_EPISODE_ID: 1744
MDC_IDC_EPISODE_ID: 1745
MDC_IDC_EPISODE_ID: 1746
MDC_IDC_EPISODE_ID: 1747
MDC_IDC_EPISODE_ID: 1748
MDC_IDC_EPISODE_ID: 1749
MDC_IDC_EPISODE_ID: 1750
MDC_IDC_EPISODE_ID: 1751
MDC_IDC_EPISODE_ID: 1752
MDC_IDC_EPISODE_ID: 1753
MDC_IDC_EPISODE_ID: 1754
MDC_IDC_EPISODE_ID: 1755
MDC_IDC_EPISODE_ID: 1756
MDC_IDC_EPISODE_ID: 1757
MDC_IDC_EPISODE_ID: 1758
MDC_IDC_EPISODE_ID: 1759
MDC_IDC_EPISODE_ID: 1760
MDC_IDC_EPISODE_ID: 1761
MDC_IDC_EPISODE_ID: 1762
MDC_IDC_EPISODE_ID: 1763
MDC_IDC_EPISODE_ID: 1764
MDC_IDC_EPISODE_ID: 1765
MDC_IDC_EPISODE_ID: 1766
MDC_IDC_EPISODE_TYPE: NORMAL
MDC_IDC_LEAD_IMPLANT_DT: NORMAL
MDC_IDC_LEAD_IMPLANT_DT: NORMAL
MDC_IDC_LEAD_LOCATION: NORMAL
MDC_IDC_LEAD_LOCATION: NORMAL
MDC_IDC_LEAD_LOCATION_DETAIL_1: NORMAL
MDC_IDC_LEAD_LOCATION_DETAIL_1: NORMAL
MDC_IDC_LEAD_MFG: NORMAL
MDC_IDC_LEAD_MFG: NORMAL
MDC_IDC_LEAD_MODEL: NORMAL
MDC_IDC_LEAD_MODEL: NORMAL
MDC_IDC_LEAD_POLARITY_TYPE: NORMAL
MDC_IDC_LEAD_POLARITY_TYPE: NORMAL
MDC_IDC_LEAD_SERIAL: NORMAL
MDC_IDC_LEAD_SERIAL: NORMAL
MDC_IDC_LEAD_SPECIAL_FUNCTION: 69
MDC_IDC_MSMT_BATTERY_DTM: NORMAL
MDC_IDC_MSMT_BATTERY_REMAINING_LONGEVITY: 145 MO
MDC_IDC_MSMT_BATTERY_RRT_TRIGGER: 2.62
MDC_IDC_MSMT_BATTERY_STATUS: NORMAL
MDC_IDC_MSMT_BATTERY_VOLTAGE: 3.03 V
MDC_IDC_MSMT_LEADCHNL_RA_IMPEDANCE_VALUE: 304 OHM
MDC_IDC_MSMT_LEADCHNL_RA_IMPEDANCE_VALUE: 456 OHM
MDC_IDC_MSMT_LEADCHNL_RA_PACING_THRESHOLD_AMPLITUDE: 0.62 V
MDC_IDC_MSMT_LEADCHNL_RA_PACING_THRESHOLD_PULSEWIDTH: 0.4 MS
MDC_IDC_MSMT_LEADCHNL_RA_SENSING_INTR_AMPL: 2.12 MV
MDC_IDC_MSMT_LEADCHNL_RA_SENSING_INTR_AMPL: 2.12 MV
MDC_IDC_MSMT_LEADCHNL_RV_IMPEDANCE_VALUE: 380 OHM
MDC_IDC_MSMT_LEADCHNL_RV_IMPEDANCE_VALUE: 570 OHM
MDC_IDC_MSMT_LEADCHNL_RV_PACING_THRESHOLD_AMPLITUDE: 0.75 V
MDC_IDC_MSMT_LEADCHNL_RV_PACING_THRESHOLD_PULSEWIDTH: 0.4 MS
MDC_IDC_MSMT_LEADCHNL_RV_SENSING_INTR_AMPL: 24 MV
MDC_IDC_MSMT_LEADCHNL_RV_SENSING_INTR_AMPL: 24 MV
MDC_IDC_PG_IMPLANT_DTM: NORMAL
MDC_IDC_PG_MFG: NORMAL
MDC_IDC_PG_MODEL: NORMAL
MDC_IDC_PG_SERIAL: NORMAL
MDC_IDC_PG_TYPE: NORMAL
MDC_IDC_SESS_CLINIC_NAME: NORMAL
MDC_IDC_SESS_DTM: NORMAL
MDC_IDC_SESS_TYPE: NORMAL
MDC_IDC_SET_BRADY_AT_MODE_SWITCH_RATE: 171 {BEATS}/MIN
MDC_IDC_SET_BRADY_HYSTRATE: NORMAL
MDC_IDC_SET_BRADY_LOWRATE: 60 {BEATS}/MIN
MDC_IDC_SET_BRADY_MAX_SENSOR_RATE: 130 {BEATS}/MIN
MDC_IDC_SET_BRADY_MAX_TRACKING_RATE: 130 {BEATS}/MIN
MDC_IDC_SET_BRADY_MODE: NORMAL
MDC_IDC_SET_BRADY_PAV_DELAY_LOW: 180 MS
MDC_IDC_SET_BRADY_SAV_DELAY_LOW: 150 MS
MDC_IDC_SET_LEADCHNL_RA_PACING_AMPLITUDE: 1.5 V
MDC_IDC_SET_LEADCHNL_RA_PACING_ANODE_ELECTRODE_1: NORMAL
MDC_IDC_SET_LEADCHNL_RA_PACING_ANODE_LOCATION_1: NORMAL
MDC_IDC_SET_LEADCHNL_RA_PACING_CAPTURE_MODE: NORMAL
MDC_IDC_SET_LEADCHNL_RA_PACING_CATHODE_ELECTRODE_1: NORMAL
MDC_IDC_SET_LEADCHNL_RA_PACING_CATHODE_LOCATION_1: NORMAL
MDC_IDC_SET_LEADCHNL_RA_PACING_POLARITY: NORMAL
MDC_IDC_SET_LEADCHNL_RA_PACING_PULSEWIDTH: 0.4 MS
MDC_IDC_SET_LEADCHNL_RA_SENSING_ANODE_ELECTRODE_1: NORMAL
MDC_IDC_SET_LEADCHNL_RA_SENSING_ANODE_LOCATION_1: NORMAL
MDC_IDC_SET_LEADCHNL_RA_SENSING_CATHODE_ELECTRODE_1: NORMAL
MDC_IDC_SET_LEADCHNL_RA_SENSING_CATHODE_LOCATION_1: NORMAL
MDC_IDC_SET_LEADCHNL_RA_SENSING_POLARITY: NORMAL
MDC_IDC_SET_LEADCHNL_RA_SENSING_SENSITIVITY: 0.15 MV
MDC_IDC_SET_LEADCHNL_RV_PACING_AMPLITUDE: 1.5 V
MDC_IDC_SET_LEADCHNL_RV_PACING_ANODE_ELECTRODE_1: NORMAL
MDC_IDC_SET_LEADCHNL_RV_PACING_ANODE_LOCATION_1: NORMAL
MDC_IDC_SET_LEADCHNL_RV_PACING_CAPTURE_MODE: NORMAL
MDC_IDC_SET_LEADCHNL_RV_PACING_CATHODE_ELECTRODE_1: NORMAL
MDC_IDC_SET_LEADCHNL_RV_PACING_CATHODE_LOCATION_1: NORMAL
MDC_IDC_SET_LEADCHNL_RV_PACING_POLARITY: NORMAL
MDC_IDC_SET_LEADCHNL_RV_PACING_PULSEWIDTH: 0.4 MS
MDC_IDC_SET_LEADCHNL_RV_SENSING_ANODE_ELECTRODE_1: NORMAL
MDC_IDC_SET_LEADCHNL_RV_SENSING_ANODE_LOCATION_1: NORMAL
MDC_IDC_SET_LEADCHNL_RV_SENSING_CATHODE_ELECTRODE_1: NORMAL
MDC_IDC_SET_LEADCHNL_RV_SENSING_CATHODE_LOCATION_1: NORMAL
MDC_IDC_SET_LEADCHNL_RV_SENSING_POLARITY: NORMAL
MDC_IDC_SET_LEADCHNL_RV_SENSING_SENSITIVITY: 0.9 MV
MDC_IDC_SET_ZONE_DETECTION_INTERVAL: 200 MS
MDC_IDC_SET_ZONE_DETECTION_INTERVAL: 350 MS
MDC_IDC_SET_ZONE_DETECTION_INTERVAL: 400 MS
MDC_IDC_SET_ZONE_TYPE: NORMAL
MDC_IDC_STAT_AT_BURDEN_PERCENT: 14.2 %
MDC_IDC_STAT_AT_DTM_END: NORMAL
MDC_IDC_STAT_AT_DTM_START: NORMAL
MDC_IDC_STAT_BRADY_AP_VP_PERCENT: 0.04 %
MDC_IDC_STAT_BRADY_AP_VS_PERCENT: 97.68 %
MDC_IDC_STAT_BRADY_AS_VP_PERCENT: 0 %
MDC_IDC_STAT_BRADY_AS_VS_PERCENT: 2.51 %
MDC_IDC_STAT_BRADY_DTM_END: NORMAL
MDC_IDC_STAT_BRADY_DTM_START: NORMAL
MDC_IDC_STAT_BRADY_RA_PERCENT_PACED: 83.77 %
MDC_IDC_STAT_BRADY_RV_PERCENT_PACED: 0.5 %
MDC_IDC_STAT_EPISODE_RECENT_COUNT: 0
MDC_IDC_STAT_EPISODE_RECENT_COUNT: 0
MDC_IDC_STAT_EPISODE_RECENT_COUNT: 16
MDC_IDC_STAT_EPISODE_RECENT_COUNT: 27
MDC_IDC_STAT_EPISODE_RECENT_COUNT: 61
MDC_IDC_STAT_EPISODE_RECENT_COUNT_DTM_END: NORMAL
MDC_IDC_STAT_EPISODE_RECENT_COUNT_DTM_START: NORMAL
MDC_IDC_STAT_EPISODE_TOTAL_COUNT: 0
MDC_IDC_STAT_EPISODE_TOTAL_COUNT: 1
MDC_IDC_STAT_EPISODE_TOTAL_COUNT: 146
MDC_IDC_STAT_EPISODE_TOTAL_COUNT: 1536
MDC_IDC_STAT_EPISODE_TOTAL_COUNT: 79
MDC_IDC_STAT_EPISODE_TOTAL_COUNT_DTM_END: NORMAL
MDC_IDC_STAT_EPISODE_TOTAL_COUNT_DTM_START: NORMAL
MDC_IDC_STAT_EPISODE_TYPE: NORMAL

## 2022-10-05 PROCEDURE — 93294 REM INTERROG EVL PM/LDLS PM: CPT | Performed by: INTERNAL MEDICINE

## 2022-10-05 PROCEDURE — 93296 REM INTERROG EVL PM/IDS: CPT | Performed by: INTERNAL MEDICINE

## 2022-10-06 RX ORDER — DILTIAZEM HYDROCHLORIDE 240 MG/1
CAPSULE, EXTENDED RELEASE ORAL
Qty: 90 CAPSULE | Refills: 3 | Status: SHIPPED | OUTPATIENT
Start: 2022-10-06 | End: 2023-11-08

## 2022-10-06 RX ORDER — DILTIAZEM HYDROCHLORIDE 180 MG/1
CAPSULE, EXTENDED RELEASE ORAL
Qty: 90 CAPSULE | Refills: 3
Start: 2022-10-06 | End: 2023-06-16

## 2022-10-06 NOTE — TELEPHONE ENCOUNTER
Remote scheduled for 10/19/22 due to full remote schedules prior and later date will allow for more data collection with Ileana. Confirmed on [facility].  Kayla Cochran RN

## 2022-10-06 NOTE — TELEPHONE ENCOUNTER
Call placed to patient with recommendation to increase diltiazem. Patient verbalized understanding and is agreeable with plan. Rx sent to patient's preferred pharmacy for 240 mg capsule to be taken in addition to 180 mg capsule for total dose of 420 mg. Encouraged patient to call with any questions or concerns.  Patient verbalized understanding and is thankful for the call.

## 2022-10-19 ENCOUNTER — TELEPHONE (OUTPATIENT)
Dept: CARDIOLOGY | Facility: CLINIC | Age: 87
End: 2022-10-19

## 2022-10-19 ENCOUNTER — ANCILLARY PROCEDURE (OUTPATIENT)
Dept: CARDIOLOGY | Facility: CLINIC | Age: 87
End: 2022-10-19
Attending: INTERNAL MEDICINE
Payer: COMMERCIAL

## 2022-10-19 DIAGNOSIS — I48.91 ATRIAL FIBRILLATION WITH RVR (H): ICD-10-CM

## 2022-10-19 DIAGNOSIS — Z95.0 PACEMAKER: ICD-10-CM

## 2022-10-19 DIAGNOSIS — I48.0 PAROXYSMAL ATRIAL FIBRILLATION (H): ICD-10-CM

## 2022-10-19 DIAGNOSIS — I49.5 SICK SINUS SYNDROME (H): Primary | ICD-10-CM

## 2022-10-19 DIAGNOSIS — I49.5 SICK SINUS SYNDROME (H): ICD-10-CM

## 2022-10-20 LAB
MDC_IDC_EPISODE_DTM: NORMAL
MDC_IDC_EPISODE_DURATION: 0 S
MDC_IDC_EPISODE_DURATION: 1 S
MDC_IDC_EPISODE_DURATION: 1281 S
MDC_IDC_EPISODE_DURATION: 1370 S
MDC_IDC_EPISODE_DURATION: 139 S
MDC_IDC_EPISODE_DURATION: 1561 S
MDC_IDC_EPISODE_DURATION: 1710 S
MDC_IDC_EPISODE_DURATION: 1743 S
MDC_IDC_EPISODE_DURATION: 1764 S
MDC_IDC_EPISODE_DURATION: 184 S
MDC_IDC_EPISODE_DURATION: 1969 S
MDC_IDC_EPISODE_DURATION: 2 S
MDC_IDC_EPISODE_DURATION: 2056 S
MDC_IDC_EPISODE_DURATION: 23 S
MDC_IDC_EPISODE_DURATION: 231 S
MDC_IDC_EPISODE_DURATION: 2402 S
MDC_IDC_EPISODE_DURATION: 2602 S
MDC_IDC_EPISODE_DURATION: 2681 S
MDC_IDC_EPISODE_DURATION: 309 S
MDC_IDC_EPISODE_DURATION: 3173 S
MDC_IDC_EPISODE_DURATION: 3216 S
MDC_IDC_EPISODE_DURATION: 330 S
MDC_IDC_EPISODE_DURATION: 3324 S
MDC_IDC_EPISODE_DURATION: 372 S
MDC_IDC_EPISODE_DURATION: 378 S
MDC_IDC_EPISODE_DURATION: 384 S
MDC_IDC_EPISODE_DURATION: 398 S
MDC_IDC_EPISODE_DURATION: 4326 S
MDC_IDC_EPISODE_DURATION: 464 S
MDC_IDC_EPISODE_DURATION: 501 S
MDC_IDC_EPISODE_DURATION: 512 S
MDC_IDC_EPISODE_DURATION: 5296 S
MDC_IDC_EPISODE_DURATION: 53 S
MDC_IDC_EPISODE_DURATION: 5470 S
MDC_IDC_EPISODE_DURATION: 691 S
MDC_IDC_EPISODE_DURATION: 78 S
MDC_IDC_EPISODE_DURATION: 8 S
MDC_IDC_EPISODE_DURATION: NORMAL S
MDC_IDC_EPISODE_DURATION: NORMAL S
MDC_IDC_EPISODE_ID: 1767
MDC_IDC_EPISODE_ID: 1768
MDC_IDC_EPISODE_ID: 1769
MDC_IDC_EPISODE_ID: 1770
MDC_IDC_EPISODE_ID: 1771
MDC_IDC_EPISODE_ID: 1772
MDC_IDC_EPISODE_ID: 1773
MDC_IDC_EPISODE_ID: 1774
MDC_IDC_EPISODE_ID: 1775
MDC_IDC_EPISODE_ID: 1776
MDC_IDC_EPISODE_ID: 1777
MDC_IDC_EPISODE_ID: 1778
MDC_IDC_EPISODE_ID: 1779
MDC_IDC_EPISODE_ID: 1780
MDC_IDC_EPISODE_ID: 1781
MDC_IDC_EPISODE_ID: 1782
MDC_IDC_EPISODE_ID: 1783
MDC_IDC_EPISODE_ID: 1784
MDC_IDC_EPISODE_ID: 1785
MDC_IDC_EPISODE_ID: 1786
MDC_IDC_EPISODE_ID: 1787
MDC_IDC_EPISODE_ID: 1788
MDC_IDC_EPISODE_ID: 1789
MDC_IDC_EPISODE_ID: 1790
MDC_IDC_EPISODE_ID: 1791
MDC_IDC_EPISODE_ID: 1792
MDC_IDC_EPISODE_ID: 1793
MDC_IDC_EPISODE_ID: 1794
MDC_IDC_EPISODE_ID: 1795
MDC_IDC_EPISODE_ID: 1796
MDC_IDC_EPISODE_ID: 1797
MDC_IDC_EPISODE_ID: 1798
MDC_IDC_EPISODE_ID: 1799
MDC_IDC_EPISODE_ID: 1800
MDC_IDC_EPISODE_ID: 1801
MDC_IDC_EPISODE_ID: 1802
MDC_IDC_EPISODE_ID: 1803
MDC_IDC_EPISODE_ID: 1804
MDC_IDC_EPISODE_ID: 1805
MDC_IDC_EPISODE_ID: 1806
MDC_IDC_EPISODE_ID: 1807
MDC_IDC_EPISODE_TYPE: NORMAL
MDC_IDC_LEAD_IMPLANT_DT: NORMAL
MDC_IDC_LEAD_IMPLANT_DT: NORMAL
MDC_IDC_LEAD_LOCATION: NORMAL
MDC_IDC_LEAD_LOCATION: NORMAL
MDC_IDC_LEAD_LOCATION_DETAIL_1: NORMAL
MDC_IDC_LEAD_LOCATION_DETAIL_1: NORMAL
MDC_IDC_LEAD_MFG: NORMAL
MDC_IDC_LEAD_MFG: NORMAL
MDC_IDC_LEAD_MODEL: NORMAL
MDC_IDC_LEAD_MODEL: NORMAL
MDC_IDC_LEAD_POLARITY_TYPE: NORMAL
MDC_IDC_LEAD_POLARITY_TYPE: NORMAL
MDC_IDC_LEAD_SERIAL: NORMAL
MDC_IDC_LEAD_SERIAL: NORMAL
MDC_IDC_LEAD_SPECIAL_FUNCTION: 69
MDC_IDC_MSMT_BATTERY_DTM: NORMAL
MDC_IDC_MSMT_BATTERY_REMAINING_LONGEVITY: 146 MO
MDC_IDC_MSMT_BATTERY_RRT_TRIGGER: 2.62
MDC_IDC_MSMT_BATTERY_STATUS: NORMAL
MDC_IDC_MSMT_BATTERY_VOLTAGE: 3.03 V
MDC_IDC_MSMT_LEADCHNL_RA_IMPEDANCE_VALUE: 285 OHM
MDC_IDC_MSMT_LEADCHNL_RA_IMPEDANCE_VALUE: 475 OHM
MDC_IDC_MSMT_LEADCHNL_RA_PACING_THRESHOLD_AMPLITUDE: 0.5 V
MDC_IDC_MSMT_LEADCHNL_RA_PACING_THRESHOLD_PULSEWIDTH: 0.4 MS
MDC_IDC_MSMT_LEADCHNL_RA_SENSING_INTR_AMPL: 0.75 MV
MDC_IDC_MSMT_LEADCHNL_RA_SENSING_INTR_AMPL: 0.75 MV
MDC_IDC_MSMT_LEADCHNL_RV_IMPEDANCE_VALUE: 380 OHM
MDC_IDC_MSMT_LEADCHNL_RV_IMPEDANCE_VALUE: 532 OHM
MDC_IDC_MSMT_LEADCHNL_RV_PACING_THRESHOLD_AMPLITUDE: 0.75 V
MDC_IDC_MSMT_LEADCHNL_RV_PACING_THRESHOLD_PULSEWIDTH: 0.4 MS
MDC_IDC_MSMT_LEADCHNL_RV_SENSING_INTR_AMPL: 26.5 MV
MDC_IDC_MSMT_LEADCHNL_RV_SENSING_INTR_AMPL: 26.5 MV
MDC_IDC_PG_IMPLANT_DTM: NORMAL
MDC_IDC_PG_MFG: NORMAL
MDC_IDC_PG_MODEL: NORMAL
MDC_IDC_PG_SERIAL: NORMAL
MDC_IDC_PG_TYPE: NORMAL
MDC_IDC_SESS_CLINIC_NAME: NORMAL
MDC_IDC_SESS_DTM: NORMAL
MDC_IDC_SESS_TYPE: NORMAL
MDC_IDC_SET_BRADY_AT_MODE_SWITCH_RATE: 171 {BEATS}/MIN
MDC_IDC_SET_BRADY_HYSTRATE: NORMAL
MDC_IDC_SET_BRADY_LOWRATE: 60 {BEATS}/MIN
MDC_IDC_SET_BRADY_MAX_SENSOR_RATE: 130 {BEATS}/MIN
MDC_IDC_SET_BRADY_MAX_TRACKING_RATE: 130 {BEATS}/MIN
MDC_IDC_SET_BRADY_MODE: NORMAL
MDC_IDC_SET_BRADY_PAV_DELAY_LOW: 180 MS
MDC_IDC_SET_BRADY_SAV_DELAY_LOW: 150 MS
MDC_IDC_SET_LEADCHNL_RA_PACING_AMPLITUDE: 1.5 V
MDC_IDC_SET_LEADCHNL_RA_PACING_ANODE_ELECTRODE_1: NORMAL
MDC_IDC_SET_LEADCHNL_RA_PACING_ANODE_LOCATION_1: NORMAL
MDC_IDC_SET_LEADCHNL_RA_PACING_CAPTURE_MODE: NORMAL
MDC_IDC_SET_LEADCHNL_RA_PACING_CATHODE_ELECTRODE_1: NORMAL
MDC_IDC_SET_LEADCHNL_RA_PACING_CATHODE_LOCATION_1: NORMAL
MDC_IDC_SET_LEADCHNL_RA_PACING_POLARITY: NORMAL
MDC_IDC_SET_LEADCHNL_RA_PACING_PULSEWIDTH: 0.4 MS
MDC_IDC_SET_LEADCHNL_RA_SENSING_ANODE_ELECTRODE_1: NORMAL
MDC_IDC_SET_LEADCHNL_RA_SENSING_ANODE_LOCATION_1: NORMAL
MDC_IDC_SET_LEADCHNL_RA_SENSING_CATHODE_ELECTRODE_1: NORMAL
MDC_IDC_SET_LEADCHNL_RA_SENSING_CATHODE_LOCATION_1: NORMAL
MDC_IDC_SET_LEADCHNL_RA_SENSING_POLARITY: NORMAL
MDC_IDC_SET_LEADCHNL_RA_SENSING_SENSITIVITY: 0.15 MV
MDC_IDC_SET_LEADCHNL_RV_PACING_AMPLITUDE: 1.5 V
MDC_IDC_SET_LEADCHNL_RV_PACING_ANODE_ELECTRODE_1: NORMAL
MDC_IDC_SET_LEADCHNL_RV_PACING_ANODE_LOCATION_1: NORMAL
MDC_IDC_SET_LEADCHNL_RV_PACING_CAPTURE_MODE: NORMAL
MDC_IDC_SET_LEADCHNL_RV_PACING_CATHODE_ELECTRODE_1: NORMAL
MDC_IDC_SET_LEADCHNL_RV_PACING_CATHODE_LOCATION_1: NORMAL
MDC_IDC_SET_LEADCHNL_RV_PACING_POLARITY: NORMAL
MDC_IDC_SET_LEADCHNL_RV_PACING_PULSEWIDTH: 0.4 MS
MDC_IDC_SET_LEADCHNL_RV_SENSING_ANODE_ELECTRODE_1: NORMAL
MDC_IDC_SET_LEADCHNL_RV_SENSING_ANODE_LOCATION_1: NORMAL
MDC_IDC_SET_LEADCHNL_RV_SENSING_CATHODE_ELECTRODE_1: NORMAL
MDC_IDC_SET_LEADCHNL_RV_SENSING_CATHODE_LOCATION_1: NORMAL
MDC_IDC_SET_LEADCHNL_RV_SENSING_POLARITY: NORMAL
MDC_IDC_SET_LEADCHNL_RV_SENSING_SENSITIVITY: 0.9 MV
MDC_IDC_SET_ZONE_DETECTION_INTERVAL: 200 MS
MDC_IDC_SET_ZONE_DETECTION_INTERVAL: 350 MS
MDC_IDC_SET_ZONE_DETECTION_INTERVAL: 400 MS
MDC_IDC_SET_ZONE_TYPE: NORMAL
MDC_IDC_STAT_AT_BURDEN_PERCENT: 8.8 %
MDC_IDC_STAT_AT_DTM_END: NORMAL
MDC_IDC_STAT_AT_DTM_START: NORMAL
MDC_IDC_STAT_BRADY_AP_VP_PERCENT: 0.03 %
MDC_IDC_STAT_BRADY_AP_VS_PERCENT: 62.53 %
MDC_IDC_STAT_BRADY_AS_VP_PERCENT: 0.01 %
MDC_IDC_STAT_BRADY_AS_VS_PERCENT: 37.66 %
MDC_IDC_STAT_BRADY_DTM_END: NORMAL
MDC_IDC_STAT_BRADY_DTM_START: NORMAL
MDC_IDC_STAT_BRADY_RA_PERCENT_PACED: 56.92 %
MDC_IDC_STAT_BRADY_RV_PERCENT_PACED: 0.52 %
MDC_IDC_STAT_EPISODE_RECENT_COUNT: 0
MDC_IDC_STAT_EPISODE_RECENT_COUNT: 0
MDC_IDC_STAT_EPISODE_RECENT_COUNT: 27
MDC_IDC_STAT_EPISODE_RECENT_COUNT: 5
MDC_IDC_STAT_EPISODE_RECENT_COUNT: 8
MDC_IDC_STAT_EPISODE_RECENT_COUNT_DTM_END: NORMAL
MDC_IDC_STAT_EPISODE_RECENT_COUNT_DTM_START: NORMAL
MDC_IDC_STAT_EPISODE_TOTAL_COUNT: 0
MDC_IDC_STAT_EPISODE_TOTAL_COUNT: 1
MDC_IDC_STAT_EPISODE_TOTAL_COUNT: 151
MDC_IDC_STAT_EPISODE_TOTAL_COUNT: 1563
MDC_IDC_STAT_EPISODE_TOTAL_COUNT: 87
MDC_IDC_STAT_EPISODE_TOTAL_COUNT_DTM_END: NORMAL
MDC_IDC_STAT_EPISODE_TOTAL_COUNT_DTM_START: NORMAL
MDC_IDC_STAT_EPISODE_TYPE: NORMAL

## 2022-10-26 NOTE — PROGRESS NOTES
Left detailed message with the clinic direct number to call for recommendations below.  ---------------------------  Esperanza Castillo MD  You; Kayla Cochran, RN 26 minutes ago (9:50 AM)     FR Prieto Pruett and Kayla,   In addition to the diltiazem, lets add metoprolol succinate 50 mg daily to medical regimen (currently would not appear to be on beta-blocker).  Lets also make arrangements for patient to follow-up in the Atrial Fibrillation Clinic with one of our NPs to review efficacy and also to allow for blood pressure check with the addition of the metoprolol..  Thanks.

## 2022-11-01 NOTE — PROGRESS NOTES
Called patient with recommendations to increase metoprolol tartrate to 75 mg twice daily. Patient verbalized understanding and is willing to trial this increased dose. Encouraged patient to report if not tolerating the increased dose. No refills needed at this time. Medication record updated.  -----------------  Message received 11/1/2022 @ 2:29  Esperanza Castillo MD Fleischhacker, Kelly, RN  Looks like on recent device check ventricular response still suboptimal.  Lets have him increase the metoprolol to 75 mg twice daily (currently would appear to be on 50 twice daily in addition to the diltiazem, but worth confirming).  We can continue to follow through intermittent device checks.  Thanks.

## 2022-11-01 NOTE — PROGRESS NOTES
Dr. Castillo,  Patient is already taking metoprolol tartrate 50 mg twice daily.  Any other recommendations?  Thank you,  [place]

## 2022-11-03 RX ORDER — METOPROLOL TARTRATE 50 MG
75 TABLET ORAL 2 TIMES DAILY
Qty: 270 TABLET | Refills: 3
Start: 2022-11-03

## 2023-01-11 ENCOUNTER — ANCILLARY PROCEDURE (OUTPATIENT)
Dept: CARDIOLOGY | Facility: CLINIC | Age: 88
End: 2023-01-11
Attending: INTERNAL MEDICINE
Payer: COMMERCIAL

## 2023-01-11 DIAGNOSIS — Z95.0 PACEMAKER: ICD-10-CM

## 2023-01-11 DIAGNOSIS — I49.5 SICK SINUS SYNDROME (H): ICD-10-CM

## 2023-01-12 ENCOUNTER — MYC MEDICAL ADVICE (OUTPATIENT)
Dept: CARDIOLOGY | Facility: CLINIC | Age: 88
End: 2023-01-12
Payer: COMMERCIAL

## 2023-01-12 ENCOUNTER — TELEPHONE (OUTPATIENT)
Dept: CARDIOLOGY | Facility: CLINIC | Age: 88
End: 2023-01-12
Payer: COMMERCIAL

## 2023-01-12 NOTE — TELEPHONE ENCOUNTER
----- Message from Nishi Cantu sent at 1/11/2023  2:37 PM CST -----  Regarding: device RN review  Encounter Type: Routine remote pacemaker transmission.  Device: Medtronic [place].  Pacing % /Programmed: AP 27%,  0% at AAIR<=>DDDR 60/130.  Lead(s): Stable.  Battery longevity: 12yrs.  Presenting: Atrial fibrillation with ventricular sensing 140 bpm.  Atrial arrhythmia: since 10/18/22; 1,209 mode switch episodes, EGMs confirm AF, v-rates >/=120bpm ~70%, AF burden 38.7%.  Anticoagulant: Eliquis.  Ventricular arrhythmia: since 10/18/22; 68 ventricular high rate episodes and 445 fast A&V episodes, available EGMs suggest RVR during AF.  Device/Lead alerts: [employer] device on advisory.  Comments: Normal pacemaker function. Routed to device RN for review.  Plan: Next remote check scheduled for 4/19/23. NESSA Cantu, Device Specialist      Known PAF with previous RVR noted on remote back in October as well. At that time Metoprolol was increased and patient was to follow up with AF clinic. However, does not appear that AF clinic follow up was scheduled. Current remote shows increased AF burden up to 38% with V-rates >/=120 bpm ~70%. Per past notes both Diltiazem and Metoprolol have been increased in last couple months. However, per device trends does not appear these medication increased have helped. Call placed to patient several times but no answer and no VM picks up. Sent [employer] message since account is active.     Kayla Cochran RN                  1 year Cardiac [facility]:

## 2023-01-16 LAB
MDC_IDC_EPISODE_DTM: NORMAL
MDC_IDC_EPISODE_DURATION: 0 S
MDC_IDC_EPISODE_DURATION: 1 S
MDC_IDC_EPISODE_DURATION: 104 S
MDC_IDC_EPISODE_DURATION: 1073 S
MDC_IDC_EPISODE_DURATION: 11 S
MDC_IDC_EPISODE_DURATION: 111 S
MDC_IDC_EPISODE_DURATION: 115 S
MDC_IDC_EPISODE_DURATION: 1168 S
MDC_IDC_EPISODE_DURATION: 117 S
MDC_IDC_EPISODE_DURATION: 1191 S
MDC_IDC_EPISODE_DURATION: 122 S
MDC_IDC_EPISODE_DURATION: 123 S
MDC_IDC_EPISODE_DURATION: 1239 S
MDC_IDC_EPISODE_DURATION: 124 S
MDC_IDC_EPISODE_DURATION: 1247 S
MDC_IDC_EPISODE_DURATION: 1254 S
MDC_IDC_EPISODE_DURATION: 13 S
MDC_IDC_EPISODE_DURATION: 130 S
MDC_IDC_EPISODE_DURATION: 14 S
MDC_IDC_EPISODE_DURATION: 14 S
MDC_IDC_EPISODE_DURATION: 140 S
MDC_IDC_EPISODE_DURATION: 1414 S
MDC_IDC_EPISODE_DURATION: 145 S
MDC_IDC_EPISODE_DURATION: 1450 S
MDC_IDC_EPISODE_DURATION: 1480 S
MDC_IDC_EPISODE_DURATION: 157 S
MDC_IDC_EPISODE_DURATION: 158 S
MDC_IDC_EPISODE_DURATION: 162 S
MDC_IDC_EPISODE_DURATION: 162 S
MDC_IDC_EPISODE_DURATION: 1678 S
MDC_IDC_EPISODE_DURATION: 1704 S
MDC_IDC_EPISODE_DURATION: 1714 S
MDC_IDC_EPISODE_DURATION: 1744 S
MDC_IDC_EPISODE_DURATION: 177 S
MDC_IDC_EPISODE_DURATION: 1770 S
MDC_IDC_EPISODE_DURATION: 1796 S
MDC_IDC_EPISODE_DURATION: 1807 S
MDC_IDC_EPISODE_DURATION: 1842 S
MDC_IDC_EPISODE_DURATION: 1854 S
MDC_IDC_EPISODE_DURATION: 1883 S
MDC_IDC_EPISODE_DURATION: 189 S
MDC_IDC_EPISODE_DURATION: 191 S
MDC_IDC_EPISODE_DURATION: 2 S
MDC_IDC_EPISODE_DURATION: 2 S
MDC_IDC_EPISODE_DURATION: 20 S
MDC_IDC_EPISODE_DURATION: 2028 S
MDC_IDC_EPISODE_DURATION: 2144 S
MDC_IDC_EPISODE_DURATION: 2169 S
MDC_IDC_EPISODE_DURATION: 2229 S
MDC_IDC_EPISODE_DURATION: 2255 S
MDC_IDC_EPISODE_DURATION: 2257 S
MDC_IDC_EPISODE_DURATION: 24 S
MDC_IDC_EPISODE_DURATION: 24 S
MDC_IDC_EPISODE_DURATION: 240 S
MDC_IDC_EPISODE_DURATION: 2405 S
MDC_IDC_EPISODE_DURATION: 2449 S
MDC_IDC_EPISODE_DURATION: 245 S
MDC_IDC_EPISODE_DURATION: 2808 S
MDC_IDC_EPISODE_DURATION: 29 S
MDC_IDC_EPISODE_DURATION: 29 S
MDC_IDC_EPISODE_DURATION: 290 S
MDC_IDC_EPISODE_DURATION: 2916 S
MDC_IDC_EPISODE_DURATION: 2952 S
MDC_IDC_EPISODE_DURATION: 2982 S
MDC_IDC_EPISODE_DURATION: 299 S
MDC_IDC_EPISODE_DURATION: 2993 S
MDC_IDC_EPISODE_DURATION: 3 S
MDC_IDC_EPISODE_DURATION: 3033 S
MDC_IDC_EPISODE_DURATION: 3052 S
MDC_IDC_EPISODE_DURATION: 3097 S
MDC_IDC_EPISODE_DURATION: 315 S
MDC_IDC_EPISODE_DURATION: 322 S
MDC_IDC_EPISODE_DURATION: 323 S
MDC_IDC_EPISODE_DURATION: 3530 S
MDC_IDC_EPISODE_DURATION: 3597 S
MDC_IDC_EPISODE_DURATION: 365 S
MDC_IDC_EPISODE_DURATION: 3695 S
MDC_IDC_EPISODE_DURATION: 375 S
MDC_IDC_EPISODE_DURATION: 3808 S
MDC_IDC_EPISODE_DURATION: 382 S
MDC_IDC_EPISODE_DURATION: 393 S
MDC_IDC_EPISODE_DURATION: 403 S
MDC_IDC_EPISODE_DURATION: 4112 S
MDC_IDC_EPISODE_DURATION: 4156 S
MDC_IDC_EPISODE_DURATION: 429 S
MDC_IDC_EPISODE_DURATION: 4331 S
MDC_IDC_EPISODE_DURATION: 4488 S
MDC_IDC_EPISODE_DURATION: 45 S
MDC_IDC_EPISODE_DURATION: 454 S
MDC_IDC_EPISODE_DURATION: 4632 S
MDC_IDC_EPISODE_DURATION: 4783 S
MDC_IDC_EPISODE_DURATION: 4792 S
MDC_IDC_EPISODE_DURATION: 4885 S
MDC_IDC_EPISODE_DURATION: 489 S
MDC_IDC_EPISODE_DURATION: 4962 S
MDC_IDC_EPISODE_DURATION: 501 S
MDC_IDC_EPISODE_DURATION: 5033 S
MDC_IDC_EPISODE_DURATION: 514 S
MDC_IDC_EPISODE_DURATION: 522 S
MDC_IDC_EPISODE_DURATION: 529 S
MDC_IDC_EPISODE_DURATION: 5379 S
MDC_IDC_EPISODE_DURATION: 5381 S
MDC_IDC_EPISODE_DURATION: 5648 S
MDC_IDC_EPISODE_DURATION: 5938 S
MDC_IDC_EPISODE_DURATION: 6 S
MDC_IDC_EPISODE_DURATION: 60 S
MDC_IDC_EPISODE_DURATION: 631 S
MDC_IDC_EPISODE_DURATION: 6437 S
MDC_IDC_EPISODE_DURATION: 645 S
MDC_IDC_EPISODE_DURATION: 647 S
MDC_IDC_EPISODE_DURATION: 653 S
MDC_IDC_EPISODE_DURATION: 665 S
MDC_IDC_EPISODE_DURATION: 676 S
MDC_IDC_EPISODE_DURATION: 683 S
MDC_IDC_EPISODE_DURATION: 6862 S
MDC_IDC_EPISODE_DURATION: 687 S
MDC_IDC_EPISODE_DURATION: 69 S
MDC_IDC_EPISODE_DURATION: 71 S
MDC_IDC_EPISODE_DURATION: 7117 S
MDC_IDC_EPISODE_DURATION: 715 S
MDC_IDC_EPISODE_DURATION: 7261 S
MDC_IDC_EPISODE_DURATION: 737 S
MDC_IDC_EPISODE_DURATION: 7912 S
MDC_IDC_EPISODE_DURATION: 800 S
MDC_IDC_EPISODE_DURATION: 81 S
MDC_IDC_EPISODE_DURATION: 821 S
MDC_IDC_EPISODE_DURATION: 838 S
MDC_IDC_EPISODE_DURATION: 8453 S
MDC_IDC_EPISODE_DURATION: 8479 S
MDC_IDC_EPISODE_DURATION: 89 S
MDC_IDC_EPISODE_DURATION: 9 S
MDC_IDC_EPISODE_DURATION: 933 S
MDC_IDC_EPISODE_DURATION: 933 S
MDC_IDC_EPISODE_DURATION: 9551 S
MDC_IDC_EPISODE_DURATION: 965 S
MDC_IDC_EPISODE_DURATION: 982 S
MDC_IDC_EPISODE_DURATION: 9957 S
MDC_IDC_EPISODE_DURATION: NORMAL S
MDC_IDC_EPISODE_ID: 1892
MDC_IDC_EPISODE_ID: 1894
MDC_IDC_EPISODE_ID: 1896
MDC_IDC_EPISODE_ID: 1897
MDC_IDC_EPISODE_ID: 1900
MDC_IDC_EPISODE_ID: 1903
MDC_IDC_EPISODE_ID: 1904
MDC_IDC_EPISODE_ID: 1905
MDC_IDC_EPISODE_ID: 1906
MDC_IDC_EPISODE_ID: 1907
MDC_IDC_EPISODE_ID: 1909
MDC_IDC_EPISODE_ID: 1912
MDC_IDC_EPISODE_ID: 1913
MDC_IDC_EPISODE_ID: 1914
MDC_IDC_EPISODE_ID: 1917
MDC_IDC_EPISODE_ID: 1918
MDC_IDC_EPISODE_ID: 1919
MDC_IDC_EPISODE_ID: 1920
MDC_IDC_EPISODE_ID: 1921
MDC_IDC_EPISODE_ID: 1922
MDC_IDC_EPISODE_ID: 1923
MDC_IDC_EPISODE_ID: 1925
MDC_IDC_EPISODE_ID: 1926
MDC_IDC_EPISODE_ID: 1927
MDC_IDC_EPISODE_ID: 1928
MDC_IDC_EPISODE_ID: 1930
MDC_IDC_EPISODE_ID: 1932
MDC_IDC_EPISODE_ID: 1934
MDC_IDC_EPISODE_ID: 1937
MDC_IDC_EPISODE_ID: 1938
MDC_IDC_EPISODE_ID: 1940
MDC_IDC_EPISODE_ID: 1942
MDC_IDC_EPISODE_ID: 1943
MDC_IDC_EPISODE_ID: 1947
MDC_IDC_EPISODE_ID: 1949
MDC_IDC_EPISODE_ID: 1950
MDC_IDC_EPISODE_ID: 1953
MDC_IDC_EPISODE_ID: 1957
MDC_IDC_EPISODE_ID: 1961
MDC_IDC_EPISODE_ID: 1963
MDC_IDC_EPISODE_ID: 1965
MDC_IDC_EPISODE_ID: 1967
MDC_IDC_EPISODE_ID: 1968
MDC_IDC_EPISODE_ID: 1970
MDC_IDC_EPISODE_ID: 1972
MDC_IDC_EPISODE_ID: 1974
MDC_IDC_EPISODE_ID: 1975
MDC_IDC_EPISODE_ID: 1976
MDC_IDC_EPISODE_ID: 1977
MDC_IDC_EPISODE_ID: 1979
MDC_IDC_EPISODE_ID: 3102
MDC_IDC_EPISODE_ID: 3171
MDC_IDC_EPISODE_ID: 3176
MDC_IDC_EPISODE_ID: 3194
MDC_IDC_EPISODE_ID: 3237
MDC_IDC_EPISODE_ID: 3245
MDC_IDC_EPISODE_ID: 3258
MDC_IDC_EPISODE_ID: 3260
MDC_IDC_EPISODE_ID: 3269
MDC_IDC_EPISODE_ID: 3285
MDC_IDC_EPISODE_ID: 3293
MDC_IDC_EPISODE_ID: 3334
MDC_IDC_EPISODE_ID: 3335
MDC_IDC_EPISODE_ID: 3342
MDC_IDC_EPISODE_ID: 3356
MDC_IDC_EPISODE_ID: 3379
MDC_IDC_EPISODE_ID: 3391
MDC_IDC_EPISODE_ID: 3400
MDC_IDC_EPISODE_ID: 3404
MDC_IDC_EPISODE_ID: 3417
MDC_IDC_EPISODE_ID: 3421
MDC_IDC_EPISODE_ID: 3422
MDC_IDC_EPISODE_ID: 3423
MDC_IDC_EPISODE_ID: 3425
MDC_IDC_EPISODE_ID: 3426
MDC_IDC_EPISODE_ID: 3428
MDC_IDC_EPISODE_ID: 3431
MDC_IDC_EPISODE_ID: 3434
MDC_IDC_EPISODE_ID: 3435
MDC_IDC_EPISODE_ID: 3437
MDC_IDC_EPISODE_ID: 3441
MDC_IDC_EPISODE_ID: 3446
MDC_IDC_EPISODE_ID: 3447
MDC_IDC_EPISODE_ID: 3450
MDC_IDC_EPISODE_ID: 3452
MDC_IDC_EPISODE_ID: 3453
MDC_IDC_EPISODE_ID: 3455
MDC_IDC_EPISODE_ID: 3458
MDC_IDC_EPISODE_ID: 3462
MDC_IDC_EPISODE_ID: 3463
MDC_IDC_EPISODE_ID: 3464
MDC_IDC_EPISODE_ID: 3465
MDC_IDC_EPISODE_ID: 3466
MDC_IDC_EPISODE_ID: 3467
MDC_IDC_EPISODE_ID: 3468
MDC_IDC_EPISODE_ID: 3469
MDC_IDC_EPISODE_ID: 3470
MDC_IDC_EPISODE_ID: 3471
MDC_IDC_EPISODE_ID: 3472
MDC_IDC_EPISODE_ID: 3473
MDC_IDC_EPISODE_ID: 3474
MDC_IDC_EPISODE_ID: 3475
MDC_IDC_EPISODE_ID: 3476
MDC_IDC_EPISODE_ID: 3477
MDC_IDC_EPISODE_ID: 3478
MDC_IDC_EPISODE_ID: 3479
MDC_IDC_EPISODE_ID: 3480
MDC_IDC_EPISODE_ID: 3481
MDC_IDC_EPISODE_ID: 3482
MDC_IDC_EPISODE_ID: 3483
MDC_IDC_EPISODE_ID: 3484
MDC_IDC_EPISODE_ID: 3485
MDC_IDC_EPISODE_ID: 3486
MDC_IDC_EPISODE_ID: 3487
MDC_IDC_EPISODE_ID: 3488
MDC_IDC_EPISODE_ID: 3489
MDC_IDC_EPISODE_ID: 3490
MDC_IDC_EPISODE_ID: 3491
MDC_IDC_EPISODE_ID: 3492
MDC_IDC_EPISODE_ID: 3493
MDC_IDC_EPISODE_ID: 3494
MDC_IDC_EPISODE_ID: 3495
MDC_IDC_EPISODE_ID: 3496
MDC_IDC_EPISODE_ID: 3497
MDC_IDC_EPISODE_ID: 3498
MDC_IDC_EPISODE_ID: 3499
MDC_IDC_EPISODE_ID: 3500
MDC_IDC_EPISODE_ID: 3501
MDC_IDC_EPISODE_ID: 3502
MDC_IDC_EPISODE_ID: 3503
MDC_IDC_EPISODE_ID: 3504
MDC_IDC_EPISODE_ID: 3505
MDC_IDC_EPISODE_ID: 3506
MDC_IDC_EPISODE_ID: 3507
MDC_IDC_EPISODE_ID: 3508
MDC_IDC_EPISODE_ID: 3509
MDC_IDC_EPISODE_ID: 3510
MDC_IDC_EPISODE_ID: 3511
MDC_IDC_EPISODE_ID: 3512
MDC_IDC_EPISODE_ID: 3513
MDC_IDC_EPISODE_ID: 3514
MDC_IDC_EPISODE_ID: 3515
MDC_IDC_EPISODE_ID: 3516
MDC_IDC_EPISODE_ID: 3517
MDC_IDC_EPISODE_ID: 3518
MDC_IDC_EPISODE_ID: 3519
MDC_IDC_EPISODE_ID: 3520
MDC_IDC_EPISODE_ID: 3521
MDC_IDC_EPISODE_ID: 3522
MDC_IDC_EPISODE_ID: 3523
MDC_IDC_EPISODE_ID: 3524
MDC_IDC_EPISODE_ID: 3525
MDC_IDC_EPISODE_ID: 3526
MDC_IDC_EPISODE_ID: 3527
MDC_IDC_EPISODE_ID: 3528
MDC_IDC_EPISODE_ID: 3529
MDC_IDC_EPISODE_TYPE: NORMAL
MDC_IDC_LEAD_IMPLANT_DT: NORMAL
MDC_IDC_LEAD_IMPLANT_DT: NORMAL
MDC_IDC_LEAD_LOCATION: NORMAL
MDC_IDC_LEAD_LOCATION: NORMAL
MDC_IDC_LEAD_LOCATION_DETAIL_1: NORMAL
MDC_IDC_LEAD_LOCATION_DETAIL_1: NORMAL
MDC_IDC_LEAD_MFG: NORMAL
MDC_IDC_LEAD_MFG: NORMAL
MDC_IDC_LEAD_MODEL: NORMAL
MDC_IDC_LEAD_MODEL: NORMAL
MDC_IDC_LEAD_POLARITY_TYPE: NORMAL
MDC_IDC_LEAD_POLARITY_TYPE: NORMAL
MDC_IDC_LEAD_SERIAL: NORMAL
MDC_IDC_LEAD_SERIAL: NORMAL
MDC_IDC_LEAD_SPECIAL_FUNCTION: 69
MDC_IDC_MSMT_BATTERY_DTM: NORMAL
MDC_IDC_MSMT_BATTERY_REMAINING_LONGEVITY: 144 MO
MDC_IDC_MSMT_BATTERY_RRT_TRIGGER: 2.62
MDC_IDC_MSMT_BATTERY_STATUS: NORMAL
MDC_IDC_MSMT_BATTERY_VOLTAGE: 3.02 V
MDC_IDC_MSMT_LEADCHNL_RA_IMPEDANCE_VALUE: 285 OHM
MDC_IDC_MSMT_LEADCHNL_RA_IMPEDANCE_VALUE: 475 OHM
MDC_IDC_MSMT_LEADCHNL_RA_PACING_THRESHOLD_AMPLITUDE: 0.5 V
MDC_IDC_MSMT_LEADCHNL_RA_PACING_THRESHOLD_PULSEWIDTH: 0.4 MS
MDC_IDC_MSMT_LEADCHNL_RA_SENSING_INTR_AMPL: 0.75 MV
MDC_IDC_MSMT_LEADCHNL_RA_SENSING_INTR_AMPL: 0.75 MV
MDC_IDC_MSMT_LEADCHNL_RV_IMPEDANCE_VALUE: 361 OHM
MDC_IDC_MSMT_LEADCHNL_RV_IMPEDANCE_VALUE: 532 OHM
MDC_IDC_MSMT_LEADCHNL_RV_PACING_THRESHOLD_AMPLITUDE: 0.75 V
MDC_IDC_MSMT_LEADCHNL_RV_PACING_THRESHOLD_PULSEWIDTH: 0.4 MS
MDC_IDC_MSMT_LEADCHNL_RV_SENSING_INTR_AMPL: 24.75 MV
MDC_IDC_MSMT_LEADCHNL_RV_SENSING_INTR_AMPL: 24.75 MV
MDC_IDC_PG_IMPLANT_DTM: NORMAL
MDC_IDC_PG_MFG: NORMAL
MDC_IDC_PG_MODEL: NORMAL
MDC_IDC_PG_SERIAL: NORMAL
MDC_IDC_PG_TYPE: NORMAL
MDC_IDC_SESS_CLINIC_NAME: NORMAL
MDC_IDC_SESS_DTM: NORMAL
MDC_IDC_SESS_TYPE: NORMAL
MDC_IDC_SET_BRADY_AT_MODE_SWITCH_RATE: 171 {BEATS}/MIN
MDC_IDC_SET_BRADY_HYSTRATE: NORMAL
MDC_IDC_SET_BRADY_LOWRATE: 60 {BEATS}/MIN
MDC_IDC_SET_BRADY_MAX_SENSOR_RATE: 130 {BEATS}/MIN
MDC_IDC_SET_BRADY_MAX_TRACKING_RATE: 130 {BEATS}/MIN
MDC_IDC_SET_BRADY_MODE: NORMAL
MDC_IDC_SET_BRADY_PAV_DELAY_LOW: 180 MS
MDC_IDC_SET_BRADY_SAV_DELAY_LOW: 150 MS
MDC_IDC_SET_LEADCHNL_RA_PACING_AMPLITUDE: 1.5 V
MDC_IDC_SET_LEADCHNL_RA_PACING_ANODE_ELECTRODE_1: NORMAL
MDC_IDC_SET_LEADCHNL_RA_PACING_ANODE_LOCATION_1: NORMAL
MDC_IDC_SET_LEADCHNL_RA_PACING_CAPTURE_MODE: NORMAL
MDC_IDC_SET_LEADCHNL_RA_PACING_CATHODE_ELECTRODE_1: NORMAL
MDC_IDC_SET_LEADCHNL_RA_PACING_CATHODE_LOCATION_1: NORMAL
MDC_IDC_SET_LEADCHNL_RA_PACING_POLARITY: NORMAL
MDC_IDC_SET_LEADCHNL_RA_PACING_PULSEWIDTH: 0.4 MS
MDC_IDC_SET_LEADCHNL_RA_SENSING_ANODE_ELECTRODE_1: NORMAL
MDC_IDC_SET_LEADCHNL_RA_SENSING_ANODE_LOCATION_1: NORMAL
MDC_IDC_SET_LEADCHNL_RA_SENSING_CATHODE_ELECTRODE_1: NORMAL
MDC_IDC_SET_LEADCHNL_RA_SENSING_CATHODE_LOCATION_1: NORMAL
MDC_IDC_SET_LEADCHNL_RA_SENSING_POLARITY: NORMAL
MDC_IDC_SET_LEADCHNL_RA_SENSING_SENSITIVITY: 0.15 MV
MDC_IDC_SET_LEADCHNL_RV_PACING_AMPLITUDE: 1.5 V
MDC_IDC_SET_LEADCHNL_RV_PACING_ANODE_ELECTRODE_1: NORMAL
MDC_IDC_SET_LEADCHNL_RV_PACING_ANODE_LOCATION_1: NORMAL
MDC_IDC_SET_LEADCHNL_RV_PACING_CAPTURE_MODE: NORMAL
MDC_IDC_SET_LEADCHNL_RV_PACING_CATHODE_ELECTRODE_1: NORMAL
MDC_IDC_SET_LEADCHNL_RV_PACING_CATHODE_LOCATION_1: NORMAL
MDC_IDC_SET_LEADCHNL_RV_PACING_POLARITY: NORMAL
MDC_IDC_SET_LEADCHNL_RV_PACING_PULSEWIDTH: 0.4 MS
MDC_IDC_SET_LEADCHNL_RV_SENSING_ANODE_ELECTRODE_1: NORMAL
MDC_IDC_SET_LEADCHNL_RV_SENSING_ANODE_LOCATION_1: NORMAL
MDC_IDC_SET_LEADCHNL_RV_SENSING_CATHODE_ELECTRODE_1: NORMAL
MDC_IDC_SET_LEADCHNL_RV_SENSING_CATHODE_LOCATION_1: NORMAL
MDC_IDC_SET_LEADCHNL_RV_SENSING_POLARITY: NORMAL
MDC_IDC_SET_LEADCHNL_RV_SENSING_SENSITIVITY: 0.9 MV
MDC_IDC_SET_ZONE_DETECTION_INTERVAL: 200 MS
MDC_IDC_SET_ZONE_DETECTION_INTERVAL: 350 MS
MDC_IDC_SET_ZONE_DETECTION_INTERVAL: 400 MS
MDC_IDC_SET_ZONE_TYPE: NORMAL
MDC_IDC_STAT_AT_BURDEN_PERCENT: 38.7 %
MDC_IDC_STAT_AT_DTM_END: NORMAL
MDC_IDC_STAT_AT_DTM_START: NORMAL
MDC_IDC_STAT_BRADY_AP_VP_PERCENT: 0.4 %
MDC_IDC_STAT_BRADY_AP_VS_PERCENT: 44.76 %
MDC_IDC_STAT_BRADY_AS_VP_PERCENT: 0.03 %
MDC_IDC_STAT_BRADY_AS_VS_PERCENT: 55.16 %
MDC_IDC_STAT_BRADY_DTM_END: NORMAL
MDC_IDC_STAT_BRADY_DTM_START: NORMAL
MDC_IDC_STAT_BRADY_RA_PERCENT_PACED: 27.77 %
MDC_IDC_STAT_BRADY_RV_PERCENT_PACED: 0.72 %
MDC_IDC_STAT_EPISODE_RECENT_COUNT: 0
MDC_IDC_STAT_EPISODE_RECENT_COUNT: 0
MDC_IDC_STAT_EPISODE_RECENT_COUNT: 1209
MDC_IDC_STAT_EPISODE_RECENT_COUNT: 398
MDC_IDC_STAT_EPISODE_RECENT_COUNT: 68
MDC_IDC_STAT_EPISODE_RECENT_COUNT_DTM_END: NORMAL
MDC_IDC_STAT_EPISODE_RECENT_COUNT_DTM_START: NORMAL
MDC_IDC_STAT_EPISODE_TOTAL_COUNT: 0
MDC_IDC_STAT_EPISODE_TOTAL_COUNT: 1
MDC_IDC_STAT_EPISODE_TOTAL_COUNT: 219
MDC_IDC_STAT_EPISODE_TOTAL_COUNT: 2772
MDC_IDC_STAT_EPISODE_TOTAL_COUNT: 485
MDC_IDC_STAT_EPISODE_TOTAL_COUNT_DTM_END: NORMAL
MDC_IDC_STAT_EPISODE_TOTAL_COUNT_DTM_START: NORMAL
MDC_IDC_STAT_EPISODE_TYPE: NORMAL

## 2023-01-16 PROCEDURE — 93296 REM INTERROG EVL PM/IDS: CPT | Performed by: INTERNAL MEDICINE

## 2023-01-16 PROCEDURE — 93294 REM INTERROG EVL PM/LDLS PM: CPT | Performed by: INTERNAL MEDICINE

## 2023-01-19 NOTE — TELEPHONE ENCOUNTER
Multiple attempts to reach patient and his alternative contacts made over last week. Unable to reach patient/family. Pro[employer]et message also unread. Will send letter requesting call. Needs to be seen in AF clinic asap.     Kayla Cochran RN

## 2023-01-25 NOTE — TELEPHONE ENCOUNTER
Don returned our call- I transferred him to  to get appointment with Afib EP JENNIE.  Sarah Alvarenga, Device RN

## 2023-02-07 ENCOUNTER — ANCILLARY PROCEDURE (OUTPATIENT)
Dept: CARDIOLOGY | Facility: CLINIC | Age: 88
End: 2023-02-07
Attending: INTERNAL MEDICINE
Payer: COMMERCIAL

## 2023-02-07 DIAGNOSIS — Z95.0 PACEMAKER: ICD-10-CM

## 2023-02-08 ENCOUNTER — OFFICE VISIT (OUTPATIENT)
Dept: CARDIOLOGY | Facility: CLINIC | Age: 88
End: 2023-02-08
Payer: COMMERCIAL

## 2023-02-08 VITALS
OXYGEN SATURATION: 98 % | HEIGHT: 68 IN | WEIGHT: 157.5 LBS | SYSTOLIC BLOOD PRESSURE: 140 MMHG | BODY MASS INDEX: 23.87 KG/M2 | HEART RATE: 76 BPM | RESPIRATION RATE: 16 BRPM | DIASTOLIC BLOOD PRESSURE: 70 MMHG

## 2023-02-08 DIAGNOSIS — I48.0 PAROXYSMAL ATRIAL FIBRILLATION (H): Primary | ICD-10-CM

## 2023-02-08 DIAGNOSIS — I10 HYPERTENSION, UNSPECIFIED TYPE: ICD-10-CM

## 2023-02-08 DIAGNOSIS — I48.91 ATRIAL FIBRILLATION WITH RVR (H): ICD-10-CM

## 2023-02-08 DIAGNOSIS — Z95.0 PACEMAKER: ICD-10-CM

## 2023-02-08 DIAGNOSIS — I49.5 SICK SINUS SYNDROME (H): ICD-10-CM

## 2023-02-08 DIAGNOSIS — Z95.0 CARDIAC PACEMAKER IN SITU: ICD-10-CM

## 2023-02-08 LAB
ATRIAL RATE - MUSE: 122 BPM
DIASTOLIC BLOOD PRESSURE - MUSE: NORMAL MMHG
INTERPRETATION ECG - MUSE: NORMAL
P AXIS - MUSE: NORMAL DEGREES
PR INTERVAL - MUSE: NORMAL MS
QRS DURATION - MUSE: 128 MS
QT - MUSE: 290 MS
QTC - MUSE: 420 MS
R AXIS - MUSE: 58 DEGREES
SYSTOLIC BLOOD PRESSURE - MUSE: NORMAL MMHG
T AXIS - MUSE: 3 DEGREES
VENTRICULAR RATE- MUSE: 126 BPM

## 2023-02-08 PROCEDURE — 99204 OFFICE O/P NEW MOD 45 MIN: CPT | Performed by: NURSE PRACTITIONER

## 2023-02-08 PROCEDURE — 93000 ELECTROCARDIOGRAM COMPLETE: CPT | Performed by: INTERNAL MEDICINE

## 2023-02-08 RX ORDER — AMIODARONE HYDROCHLORIDE 200 MG/1
TABLET ORAL
Qty: 180 TABLET | Refills: 3 | Status: SHIPPED | OUTPATIENT
Start: 2023-02-08 | End: 2023-12-20

## 2023-02-08 NOTE — PATIENT INSTRUCTIONS
Daniel Sawant,    It was a pleasure to see you today at the [facility].     My recommendations after this visit include:    Start taking Amiodarone  200 mg one tablet three times daily starting tomorrow 2/9/2023 for 14 days then on 2/24/2023 start taking Amiodarone 200 mg twice daily for 2 weeks. Starting on 3/10/2023, start taking Amiodarone 200 mg once daily gong forward. This is to get you back in normal sinus rhythm. I will need to see you back in clinic for another device check in 4 to 6 weeks to follow up on this so make your appointment today.   I have given you a list of things to watch for while taking this medication, call us if you have any issues or concerns. Our phone number is listed below.   Continue with your other medications as you have been.   Continue to remain hydrated and activity as tolerated.   When I see you in 6 weeks, I will be checking your thyroid and liver function levels due to the Amiodarone so plan on a blood draw that day here in clinic.         Gracia Ramirez CNP  [facility], Electrophysiology  [phone]  EP nurses [phone]

## 2023-02-08 NOTE — LETTER
2/8/2023    Pepe Villeda MD  [facility] [address] [place] [address]    RE: Daniel Sawant       Dear Colleague,     I had the pleasure of seeing Daniel Sawant in the [facility].    Thank you, Dr. Castillo, for asking the [facility] team to see Mr. Daniel Sawant to evaluate atrial fibrillation and device check.    Assessment/Recommendations     Assessment/Plan:    Diagnoses and all orders for this visit:  Paroxysmal atrial fibrillation (H)  Atrial fibrillation with RVR (H)    Load with Amiodarone 200 mg TID x2 weeks, then 200 mg BID x2 weeks then 200 mg daily.   He has severe biatrial enlargement on his most recent ECHO.   Afib with RVR burden continues to increase despite aggressive rate control regimen with Diltiazem  mg daily and Metoprolol tartrate 75 mg twice daily  Amiodarone educational handout given to patient so he can keep an eye out for issues related to Amiodarone use  No history of lung disease  I will see him back in clinic with another device check in 6 weeks  Check TSH and LFT's at that time.   Watch balance, he does have issues with this and ambulates using his cane.      Cardiac pacemaker in situ    Dual-chamber pacemaker in place, confirmed by x-ray.  Device check completed yesterday 2/7/2023 showed atrial fibrillation with RVR burden of 56.9%, previously in January his burden was 38.7% and in October his burden was 8.8%.  Despite being on diltiazem extended release 420 mg daily along with the metoprolol tartrate 75 mg twice daily, his A-fib burden continues to increase, ventricular rates 90% of the time are between 158 and 207. He is without symptoms despite being in Afib with elevated ventricular rates.   Recommend loading with amiodarone 200 mg 3 times daily x2 weeks followed by 200 mg twice daily x2 weeks and then 200 mg daily going forward, he does have severe biatrial enlargement on his echocardiogram from 2021.         Hypertension, unspecified type    Blood pressure slightly elevated today, will reevaluate this when I see him again in clinic in 6 weeks, for now, no medication changes were made under the adding the amiodarone loading dose over the next 6 weeks.  For now, he will continue with his current doses of metoprolol and diltiazem.      [id number] score of 3 for age over 75 and hypertension and on Eliquis 5 mg BID.  Follow up in 4 to 6 weeks with Gracia ARANGO for Afib and another device check.     History of Present Illness/Subjective     Daniel Sawant is a very pleasant 88 year old male who comes in today for EP device check, atrial fibrillation follow up.  Daniel Sawant has a known history of paroxysmal atrial fibrillation. This was initially diagnosed in 2015. Daniel at one point he had been on sotalol therapy a few years ago though this was discontinued after approximate 6-month duration due to tendency toward bradycardia.    He is currently not taking any antiarrhythmic medications, he is anticoagulated with the Eliquis 5 mg twice daily.     Daniel underwent permanent pacemaker placement 8 February 2021, dual chamber due to sick sinus syndrome.  His last several checks did show atrial fibrillation presence, in October his A-fib burden was 8.8%, this did increase in January to 38.7% and as of yesterday, his current atrial fibrillation with RVR burden is 56.9% despite being on the diltiazem  mg a day and metoprolol tartrate 75 mg twice daily.  He remains asymptomatic despite his atrial fibrillation burden presents, he has struggled with balance disturbance for the last several years and does use a cane to ambulate otherwise he denies any shortness of breath, no reported changes with his exercise tolerance, he does walk 1 mile a day without difficulty. He denies any syncope, chest pain or palpitations.  He does have some chronic lower extremity swelling which has been present for a while now, he does take  furosemide for this.  He does remain fairly hydrated throughout the day, he drinks at least 24 ounces of water per plus V8 and cherry juice for inflammation.  He drinks roughly half a bottle of alcohol per month.  When I inquired about any snoring issues or difficulty with daytime sleepiness, he states he has never had anybody tell him that he snores at night or is apneic at night.   During our discussion today, he did want to reiterate that he does have a DO NOT RESUSCITATE order in place. Blood pressure today is slightly elevated at 140/70. He does not regularly check his heart rate or BP at home. He has no concerns today that were verbalized during our discussion today regarding his atrial fibrillation treatment plan.          Cardiographics (reviewed):    EKG today 2/8/2023 shows Atrial fibrillation with RVR, PVC's, ventricular rate 126 bpm, QT/QTC: 290/420, dual chamber pacemaker in place.           EKG 2/6/2021 shows sinus bradycardia with PAC's, right BBB QT/QTC: 470/465          Echo result w/o MOPS:  2/4/2021      Left ventricular ejection fraction is low normal. The calculated left ventricular ejection fraction is 50%.    Normal left ventricular cavity size and wall thickness.    The right ventricle is moderately dilated. The systolic function is mildly reduced.    Severe biatrial enlargement.    Moderate tricuspid valve regurgitation.    Moderate pulmonary hypertension present. The estimated systolic pulmonary artery pressure is 43 mm Hg.    When compared to the previous study dated 8/14/2015, findings are overall similar. Right ventricular size may have increased slightly, but systolic function and degree of tricuspid regurgitaition appear similar.        Cardiac testing personally reviewed: Device check completed 2/7/2023  Device: Medtronic [place] (D) pacemaker  Pacing % /Programmed: AP 20.8%,  0.5%, AAIR-DDDR 60-130bpm  Lead(s): Stable  Battery longevity: Estimating 11.9 years remaining  Presenting:  AF/-215bpm  Atrial arrhythmia: 226 mode switches logged, available EGM's from 2/6/23 suggest AF w/ -159bpm, burden 56.9%, v-rate >/=120bpm ~ 90%.   Anticoagulant: Eliquis  Ventricular arrhythmia: 14 VHR and 82 Fast A&V episodes logged, EGM's suggest AF w/ -207bpm.  Device/Lead alerts: NA  Comments: Normal device function.  Plan: Remote device check scheduled for 4/19/23. Scheduled to see Gracia Ramirez NP 2/8/23.  Minda Nowak RN          Problem List:  Patient Active Problem List   Diagnosis     Atrial fibrillation with RVR (H)     Hypervolemia, unspecified hypervolemia type     Lower extremity edema     Hypertension, unspecified type     Bradycardia     Tachycardia-bradycardia syndrome (H)     Tachy-paras syndrome (H)     Cardiac pacemaker in situ     Paroxysmal atrial fibrillation (H)     Revi  e  Physical Examination Review of Systems   w [facility]  There were no vitals taken for this visit.  There is no height or weight on file to calculate BMI.  Wt Readings from Last 3 Encounters:   06/27/22 72.2 kg (159 lb 3.2 oz)   05/05/21 69.9 kg (154 lb)   02/15/21 71.7 kg (158 lb)     General Appearance:   Alert, well-appearing and in no acute distress.   HEENT: Atraumatic, normocephalic.  No scleral icterus, normal conjunctivae; mucous membranes pink and moist.     Chest: Chest symmetric, spine straight.   Lungs:   Respirations unlabored: Lungs are clear to auscultation.   Cardiovascular:   Normal first and second heart sounds with no murmurs, rubs, or gallops.  Irregular, tachycardiac at rest.   Normal JVD, 2+ pitting LE edema.       Extremities: No cyanosis or clubbing   Musculoskeletal: Moves all extremities, uses cane for ambulation, walks with shuffled gait.    Skin: Warm, dry, intact.    Neurologic: Mood and affect are appropriate, alert and oriented to person, place, time, and situation     ROS: 10 point ROS neg other than the symptoms noted above in the HPI.     Medical History  Surgical  History Family History Social History     Past Medical History:   Diagnosis Date     Arthritis     bilateral hands     Atrial fibrillation (H)      Atrial flutter (H)      Cancer (H)     skin cancer     Gout      Hypertension     Past Surgical History:   Procedure Laterality Date     BIOPSY SKIN (LOCATION)      to remove skin cancer     CIRCUMCISION       EP PACEMAKER INSERT N/A 2/8/2021    Procedure: EP Pacemaker Insertion;  Surgeon: Bautista Umana MD;  Location: [facility] Cardiac Cath Lab;  Service: Cardiology     HERNIA REPAIR Right 1988    inquinal hernia     TONSILLECTOMY      No family history on file. History   Smoking Status     Never   Smokeless Tobacco     Never     Social History    Substance and Sexual Activity      Alcohol use: No        Comment: Alcoholic Drinks/day: very little       Medications  Allergies     Current Outpatient Medications   Medication Sig Dispense Refill     apixaban ANTICOAGULANT (ELIQUIS) 5 MG tablet Take 1 tablet (5 mg) by mouth 2 times daily 180 tablet 3     arginine HCl, L-arginine, 500 mg cap [ARGININE HCL, L-ARGININE, 500 MG CAP] Take 500 mg by mouth 2 (two) times a day.        diltiazem ER (DILT-XR) 180 MG 24 hr capsule Take 240 mg in addition to 180 mg for total dose of 420 mg daily. Dose increased 10/6/2022. 90 capsule 3     diltiazem ER (DILT-XR) 240 MG 24 hr ER beaded capsule Take 240 mg in addition to 180 mg for total dose of 420 mg daily. Dose increased 10/6/2022. 90 capsule 3     DOCOSAHEXANOIC ACID/EPA (FISH OIL ORAL) Take 500 mg by mouth 3 times daily       furosemide (LASIX) 40 MG tablet [FUROSEMIDE (LASIX) 40 MG TABLET] Take 1 tablet (40 mg total) by mouth daily. 30 tablet 0     grape seed extract (GRAPE SEED ORAL) [GRAPE SEED EXTRACT (GRAPE SEED ORAL)] Take 1 tablet by mouth daily.       lactase (LACTAID) 3,000 unit tablet [LACTASE (LACTAID) 3,000 UNIT TABLET] Take 1 tablet (3,000 Units total) by mouth 3 (three) times a day with meals.  0     levOCARNitine  (L-CARNITINE) 500 mg Tab [LEVOCARNITINE (L-CARNITINE) 500 MG TAB] Take 500 mg by mouth daily.       metoprolol tartrate (LOPRESSOR) 50 MG tablet Take 1.5 tablets (75 mg) by mouth 2 times daily 270 tablet 3     multivitamin with minerals (THERA-M) 9 mg iron-400 mcg Tab tablet [MULTIVITAMIN WITH MINERALS (THERA-M) 9 MG IRON-400 MCG TAB TABLET] Take 1 tablet by mouth daily.       UNABLE TO FIND Take 3 tablets by mouth daily        Allergies   Allergen Reactions     Simvastatin      Other reaction(s): muscle aches      Medical, surgical, family, social history, and medications were all reviewed and updated as necessary.   Lab Results    Chemistry/lipid CBC Cardiac Enzymes/BNP/TSH/INR   Recent Labs   Lab Test 04/18/22  0926   CHOL 225*   HDL 53   *   TRIG 169*     Recent Labs   Lab Test 04/18/22  0926 04/11/19  1010 03/29/18  1027   * 135* 102     Recent Labs   Lab Test 04/18/22  0926      POTASSIUM 4.0   CHLORIDE 103   CO2 31      BUN 13   CR 0.87   GFRESTIMATED 84   DARCY 9.4     Recent Labs   Lab Test 04/18/22  0926 02/18/21  1108 02/09/21  0444   CR 0.87 0.88 0.95     No results for input(s): A1C in the last 13241 hours.       Recent Labs   Lab Test 02/09/21  0444 02/07/21  0429   WBC  --  8.8   HGB  --  13.4*   HCT  --  39.9*   MCV  --  94    155     Recent Labs   Lab Test 02/07/21  0429 02/05/21  2241 02/05/21  0339   HGB 13.4* 13.2* 13.1*    Recent Labs   Lab Test 02/05/21  2241 02/05/21  0339 02/03/21  1303   TROPONINI 0.03 0.04 0.04     Recent Labs   Lab Test 02/03/21  1303   *     Recent Labs   Lab Test 02/03/21  1303   TSH 1.71     Recent Labs   Lab Test 02/04/21  0530   INR 1.33*          Total Time- 53 minutes spent on date of encounter doing chart review, history and exam, documentation and further activities as noted above.  This note has been dictated using voice recognition software. Any grammatical, typographical, or context distortions are unintentional and  inherent to the software.    Gracia Ramirez CNP  [facility]  [phone]           Thank you for allowing me to participate in the care of your patient.      Sincerely,     Gracia Ramirez NP     [facility]  cc:   Esperanza Castillo MD  [address] [address]

## 2023-02-08 NOTE — PROGRESS NOTES
Thank you, Dr. Castillo, for asking the [facility] team to see Mr. Daniel Sawant to evaluate atrial fibrillation and device check.    Assessment/Recommendations     Assessment/Plan:    Diagnoses and all orders for this visit:  Paroxysmal atrial fibrillation (H)  Atrial fibrillation with RVR (H)    Load with Amiodarone 200 mg TID x2 weeks, then 200 mg BID x2 weeks then 200 mg daily.   He has severe biatrial enlargement on his most recent ECHO.   Afib with RVR burden continues to increase despite aggressive rate control regimen with Diltiazem  mg daily and Metoprolol tartrate 75 mg twice daily  Amiodarone educational handout given to patient so he can keep an eye out for issues related to Amiodarone use  No history of lung disease  I will see him back in clinic with another device check in 6 weeks  Check TSH and LFT's at that time.   Watch balance, he does have issues with this and ambulates using his cane.      Cardiac pacemaker in situ    Dual-chamber pacemaker in place, confirmed by x-ray.  Device check completed yesterday 2/7/2023 showed atrial fibrillation with RVR burden of 56.9%, previously in January his burden was 38.7% and in October his burden was 8.8%.  Despite being on diltiazem extended release 420 mg daily along with the metoprolol tartrate 75 mg twice daily, his A-fib burden continues to increase, ventricular rates 90% of the time are between 158 and 207. He is without symptoms despite being in Afib with elevated ventricular rates.   Recommend loading with amiodarone 200 mg 3 times daily x2 weeks followed by 200 mg twice daily x2 weeks and then 200 mg daily going forward, he does have severe biatrial enlargement on his echocardiogram from 2021.        Hypertension, unspecified type    Blood pressure slightly elevated today, will reevaluate this when I see him again in clinic in 6 weeks, for now, no medication changes were made under the adding the amiodarone loading dose over  the next 6 weeks.  For now, he will continue with his current doses of metoprolol and diltiazem.      [id number] score of 3 for age over 75 and hypertension and on Eliquis 5 mg BID.  Follow up in 4 to 6 weeks with Gracia ARANGO for Afib and another device check.     History of Present Illness/Subjective     Daniel Sawant is a very pleasant 88 year old male who comes in today for EP device check, atrial fibrillation follow up.  Daniel Sawant has a known history of paroxysmal atrial fibrillation. This was initially diagnosed in 2015. Daniel at one point he had been on sotalol therapy a few years ago though this was discontinued after approximate 6-month duration due to tendency toward bradycardia.    He is currently not taking any antiarrhythmic medications, he is anticoagulated with the Eliquis 5 mg twice daily.     Daniel underwent permanent pacemaker placement 8 February 2021, dual chamber due to sick sinus syndrome.  His last several checks did show atrial fibrillation presence, in October his A-fib burden was 8.8%, this did increase in January to 38.7% and as of yesterday, his current atrial fibrillation with RVR burden is 56.9% despite being on the diltiazem  mg a day and metoprolol tartrate 75 mg twice daily.  He remains asymptomatic despite his atrial fibrillation burden presents, he has struggled with balance disturbance for the last several years and does use a cane to ambulate otherwise he denies any shortness of breath, no reported changes with his exercise tolerance, he does walk 1 mile a day without difficulty. He denies any syncope, chest pain or palpitations.  He does have some chronic lower extremity swelling which has been present for a while now, he does take furosemide for this.  He does remain fairly hydrated throughout the day, he drinks at least 24 ounces of water per plus V8 and cherry juice for inflammation.  He drinks roughly half a bottle of alcohol per month.  When I inquired about any  snoring issues or difficulty with daytime sleepiness, he states he has never had anybody tell him that he snores at night or is apneic at night.   During our discussion today, he did want to reiterate that he does have a DO NOT RESUSCITATE order in place. Blood pressure today is slightly elevated at 140/70. He does not regularly check his heart rate or BP at home. He has no concerns today that were verbalized during our discussion today regarding his atrial fibrillation treatment plan.          Cardiographics (reviewed):    EKG today 2/8/2023 shows Atrial fibrillation with RVR, PVC's, ventricular rate 126 bpm, QT/QTC: 290/420, dual chamber pacemaker in place.           EKG 2/6/2021 shows sinus bradycardia with PAC's, right BBB QT/QTC: 470/465          Echo result w/o MOPS:  2/4/2021      Left ventricular ejection fraction is low normal. The calculated left ventricular ejection fraction is 50%.    Normal left ventricular cavity size and wall thickness.    The right ventricle is moderately dilated. The systolic function is mildly reduced.    Severe biatrial enlargement.    Moderate tricuspid valve regurgitation.    Moderate pulmonary hypertension present. The estimated systolic pulmonary artery pressure is 43 mm Hg.    When compared to the previous study dated 8/14/2015, findings are overall similar. Right ventricular size may have increased slightly, but systolic function and degree of tricuspid regurgitaition appear similar.        Cardiac testing personally reviewed: Device check completed 2/7/2023  Device: Medtronic [place] (D) pacemaker  Pacing % /Programmed: AP 20.8%,  0.5%, AAIR-DDDR 60-130bpm  Lead(s): Stable  Battery longevity: Estimating 11.9 years remaining  Presenting: AF/-215bpm  Atrial arrhythmia: 226 mode switches logged, available EGM's from 2/6/23 suggest AF w/ -159bpm, burden 56.9%, v-rate >/=120bpm ~ 90%.   Anticoagulant: Eliquis  Ventricular arrhythmia: 14 VHR and 82 Fast A&V episodes  logged, EGM's suggest AF w/ -207bpm.  Device/Lead alerts: NA  Comments: Normal device function.  Plan: Remote device check scheduled for 4/19/23. Scheduled to see Gracia Ramirez NP 2/8/23.  Minda Nowak RN          Problem List:  Patient Active Problem List   Diagnosis     Atrial fibrillation with RVR (H)     Hypervolemia, unspecified hypervolemia type     Lower extremity edema     Hypertension, unspecified type     Bradycardia     Tachycardia-bradycardia syndrome (H)     Tachy-paras syndrome (H)     Cardiac pacemaker in situ     Paroxysmal atrial fibrillation (H)     Revi  e  Physical Examination Review of Systems   w [facility]  There were no vitals taken for this visit.  There is no height or weight on file to calculate BMI.  Wt Readings from Last 3 Encounters:   06/27/22 72.2 kg (159 lb 3.2 oz)   05/05/21 69.9 kg (154 lb)   02/15/21 71.7 kg (158 lb)     General Appearance:   Alert, well-appearing and in no acute distress.   HEENT: Atraumatic, normocephalic.  No scleral icterus, normal conjunctivae; mucous membranes pink and moist.     Chest: Chest symmetric, spine straight.   Lungs:   Respirations unlabored: Lungs are clear to auscultation.   Cardiovascular:   Normal first and second heart sounds with no murmurs, rubs, or gallops.  Irregular, tachycardiac at rest.   Normal JVD, 2+ pitting LE edema.       Extremities: No cyanosis or clubbing   Musculoskeletal: Moves all extremities, uses cane for ambulation, walks with shuffled gait.    Skin: Warm, dry, intact.    Neurologic: Mood and affect are appropriate, alert and oriented to person, place, time, and situation     ROS: 10 point ROS neg other than the symptoms noted above in the HPI.     Medical History  Surgical History Family History Social History     Past Medical History:   Diagnosis Date     Arthritis     bilateral hands     Atrial fibrillation (H)      Atrial flutter (H)      Cancer (H)     skin cancer     Gout      Hypertension     Past Surgical  History:   Procedure Laterality Date     BIOPSY SKIN (LOCATION)      to remove skin cancer     CIRCUMCISION       EP PACEMAKER INSERT N/A 2/8/2021    Procedure: EP Pacemaker Insertion;  Surgeon: Bautista Umana MD;  Location: [facility] Cardiac Cath Lab;  Service: Cardiology     HERNIA REPAIR Right 1988    inquinal hernia     TONSILLECTOMY      No family history on file. History   Smoking Status     Never   Smokeless Tobacco     Never     Social History    Substance and Sexual Activity      Alcohol use: No        Comment: Alcoholic Drinks/day: very little       Medications  Allergies     Current Outpatient Medications   Medication Sig Dispense Refill     apixaban ANTICOAGULANT (ELIQUIS) 5 MG tablet Take 1 tablet (5 mg) by mouth 2 times daily 180 tablet 3     arginine HCl, L-arginine, 500 mg cap [ARGININE HCL, L-ARGININE, 500 MG CAP] Take 500 mg by mouth 2 (two) times a day.        diltiazem ER (DILT-XR) 180 MG 24 hr capsule Take 240 mg in addition to 180 mg for total dose of 420 mg daily. Dose increased 10/6/2022. 90 capsule 3     diltiazem ER (DILT-XR) 240 MG 24 hr ER beaded capsule Take 240 mg in addition to 180 mg for total dose of 420 mg daily. Dose increased 10/6/2022. 90 capsule 3     DOCOSAHEXANOIC ACID/EPA (FISH OIL ORAL) Take 500 mg by mouth 3 times daily       furosemide (LASIX) 40 MG tablet [FUROSEMIDE (LASIX) 40 MG TABLET] Take 1 tablet (40 mg total) by mouth daily. 30 tablet 0     grape seed extract (GRAPE SEED ORAL) [GRAPE SEED EXTRACT (GRAPE SEED ORAL)] Take 1 tablet by mouth daily.       lactase (LACTAID) 3,000 unit tablet [LACTASE (LACTAID) 3,000 UNIT TABLET] Take 1 tablet (3,000 Units total) by mouth 3 (three) times a day with meals.  0     levOCARNitine (L-CARNITINE) 500 mg Tab [LEVOCARNITINE (L-CARNITINE) 500 MG TAB] Take 500 mg by mouth daily.       metoprolol tartrate (LOPRESSOR) 50 MG tablet Take 1.5 tablets (75 mg) by mouth 2 times daily 270 tablet 3     multivitamin with minerals (THERA-M) 9  mg iron-400 mcg Tab tablet [MULTIVITAMIN WITH MINERALS (THERA-M) 9 MG IRON-400 MCG TAB TABLET] Take 1 tablet by mouth daily.       UNABLE TO FIND Take 3 tablets by mouth daily        Allergies   Allergen Reactions     Simvastatin      Other reaction(s): muscle aches      Medical, surgical, family, social history, and medications were all reviewed and updated as necessary.   Lab Results    Chemistry/lipid CBC Cardiac Enzymes/BNP/TSH/INR   Recent Labs   Lab Test 04/18/22  0926   CHOL 225*   HDL 53   *   TRIG 169*     Recent Labs   Lab Test 04/18/22  0926 04/11/19  1010 03/29/18  1027   * 135* 102     Recent Labs   Lab Test 04/18/22  0926      POTASSIUM 4.0   CHLORIDE 103   CO2 31      BUN 13   CR 0.87   GFRESTIMATED 84   DARCY 9.4     Recent Labs   Lab Test 04/18/22  0926 02/18/21  1108 02/09/21  0444   CR 0.87 0.88 0.95     No results for input(s): A1C in the last 99380 hours.       Recent Labs   Lab Test 02/09/21  0444 02/07/21  0429   WBC  --  8.8   HGB  --  13.4*   HCT  --  39.9*   MCV  --  94    155     Recent Labs   Lab Test 02/07/21  0429 02/05/21  2241 02/05/21  0339   HGB 13.4* 13.2* 13.1*    Recent Labs   Lab Test 02/05/21  2241 02/05/21  0339 02/03/21  1303   TROPONINI 0.03 0.04 0.04     Recent Labs   Lab Test 02/03/21  1303   *     Recent Labs   Lab Test 02/03/21  1303   TSH 1.71     Recent Labs   Lab Test 02/04/21  0530   INR 1.33*          Total Time- 53 minutes spent on date of encounter doing chart review, history and exam, documentation and further activities as noted above.  This note has been dictated using voice recognition software. Any grammatical, typographical, or context distortions are unintentional and inherent to the software.    Gracia Ramirez [facility]  [phone]

## 2023-03-03 LAB
MDC_IDC_EPISODE_DTM: NORMAL
MDC_IDC_EPISODE_DURATION: 0 S
MDC_IDC_EPISODE_DURATION: 1 S
MDC_IDC_EPISODE_DURATION: 1085 S
MDC_IDC_EPISODE_DURATION: 1125 S
MDC_IDC_EPISODE_DURATION: 1158 S
MDC_IDC_EPISODE_DURATION: 119 S
MDC_IDC_EPISODE_DURATION: 124 S
MDC_IDC_EPISODE_DURATION: 130 S
MDC_IDC_EPISODE_DURATION: 1315 S
MDC_IDC_EPISODE_DURATION: 1530 S
MDC_IDC_EPISODE_DURATION: 155 S
MDC_IDC_EPISODE_DURATION: 1562 S
MDC_IDC_EPISODE_DURATION: 1614 S
MDC_IDC_EPISODE_DURATION: 1675 S
MDC_IDC_EPISODE_DURATION: 196 S
MDC_IDC_EPISODE_DURATION: 1992 S
MDC_IDC_EPISODE_DURATION: 2065 S
MDC_IDC_EPISODE_DURATION: 223 S
MDC_IDC_EPISODE_DURATION: 2414 S
MDC_IDC_EPISODE_DURATION: 253 S
MDC_IDC_EPISODE_DURATION: 265 S
MDC_IDC_EPISODE_DURATION: 2657 S
MDC_IDC_EPISODE_DURATION: 269 S
MDC_IDC_EPISODE_DURATION: 2735 S
MDC_IDC_EPISODE_DURATION: 2834 S
MDC_IDC_EPISODE_DURATION: 286 S
MDC_IDC_EPISODE_DURATION: 286 S
MDC_IDC_EPISODE_DURATION: 2984 S
MDC_IDC_EPISODE_DURATION: 3047 S
MDC_IDC_EPISODE_DURATION: 3111 S
MDC_IDC_EPISODE_DURATION: 312 S
MDC_IDC_EPISODE_DURATION: 3329 S
MDC_IDC_EPISODE_DURATION: 3367 S
MDC_IDC_EPISODE_DURATION: 338 S
MDC_IDC_EPISODE_DURATION: 342 S
MDC_IDC_EPISODE_DURATION: 347 S
MDC_IDC_EPISODE_DURATION: 348 S
MDC_IDC_EPISODE_DURATION: 3589 S
MDC_IDC_EPISODE_DURATION: 369 S
MDC_IDC_EPISODE_DURATION: 39 S
MDC_IDC_EPISODE_DURATION: 3969 S
MDC_IDC_EPISODE_DURATION: 407 S
MDC_IDC_EPISODE_DURATION: 4084 S
MDC_IDC_EPISODE_DURATION: 417 S
MDC_IDC_EPISODE_DURATION: 4203 S
MDC_IDC_EPISODE_DURATION: 444 S
MDC_IDC_EPISODE_DURATION: 451 S
MDC_IDC_EPISODE_DURATION: 477 S
MDC_IDC_EPISODE_DURATION: 5105 S
MDC_IDC_EPISODE_DURATION: 53 S
MDC_IDC_EPISODE_DURATION: 5342 S
MDC_IDC_EPISODE_DURATION: 5409 S
MDC_IDC_EPISODE_DURATION: 56 S
MDC_IDC_EPISODE_DURATION: 580 S
MDC_IDC_EPISODE_DURATION: 599 S
MDC_IDC_EPISODE_DURATION: 5997 S
MDC_IDC_EPISODE_DURATION: 604 S
MDC_IDC_EPISODE_DURATION: 6532 S
MDC_IDC_EPISODE_DURATION: 6630 S
MDC_IDC_EPISODE_DURATION: 6651 S
MDC_IDC_EPISODE_DURATION: 6764 S
MDC_IDC_EPISODE_DURATION: 68 S
MDC_IDC_EPISODE_DURATION: 72 S
MDC_IDC_EPISODE_DURATION: 735 S
MDC_IDC_EPISODE_DURATION: 79 S
MDC_IDC_EPISODE_DURATION: 7925 S
MDC_IDC_EPISODE_DURATION: 800 S
MDC_IDC_EPISODE_DURATION: 803 S
MDC_IDC_EPISODE_DURATION: 82 S
MDC_IDC_EPISODE_DURATION: 8451 S
MDC_IDC_EPISODE_DURATION: 8523 S
MDC_IDC_EPISODE_DURATION: 858 S
MDC_IDC_EPISODE_DURATION: 920 S
MDC_IDC_EPISODE_DURATION: 94 S
MDC_IDC_EPISODE_DURATION: 95 S
MDC_IDC_EPISODE_DURATION: NORMAL S
MDC_IDC_EPISODE_ID: 3530
MDC_IDC_EPISODE_ID: 3561
MDC_IDC_EPISODE_ID: 3608
MDC_IDC_EPISODE_ID: 3614
MDC_IDC_EPISODE_ID: 3622
MDC_IDC_EPISODE_ID: 3643
MDC_IDC_EPISODE_ID: 3687
MDC_IDC_EPISODE_ID: 3692
MDC_IDC_EPISODE_ID: 3717
MDC_IDC_EPISODE_ID: 3724
MDC_IDC_EPISODE_ID: 3735
MDC_IDC_EPISODE_ID: 3746
MDC_IDC_EPISODE_ID: 3753
MDC_IDC_EPISODE_ID: 3771
MDC_IDC_EPISODE_ID: 3772
MDC_IDC_EPISODE_ID: 3773
MDC_IDC_EPISODE_ID: 3774
MDC_IDC_EPISODE_ID: 3775
MDC_IDC_EPISODE_ID: 3776
MDC_IDC_EPISODE_ID: 3777
MDC_IDC_EPISODE_ID: 3778
MDC_IDC_EPISODE_ID: 3779
MDC_IDC_EPISODE_ID: 3780
MDC_IDC_EPISODE_ID: 3781
MDC_IDC_EPISODE_ID: 3782
MDC_IDC_EPISODE_ID: 3783
MDC_IDC_EPISODE_ID: 3784
MDC_IDC_EPISODE_ID: 3786
MDC_IDC_EPISODE_ID: 3787
MDC_IDC_EPISODE_ID: 3788
MDC_IDC_EPISODE_ID: 3789
MDC_IDC_EPISODE_ID: 3790
MDC_IDC_EPISODE_ID: 3791
MDC_IDC_EPISODE_ID: 3792
MDC_IDC_EPISODE_ID: 3793
MDC_IDC_EPISODE_ID: 3794
MDC_IDC_EPISODE_ID: 3795
MDC_IDC_EPISODE_ID: 3796
MDC_IDC_EPISODE_ID: 3797
MDC_IDC_EPISODE_ID: 3798
MDC_IDC_EPISODE_ID: 3799
MDC_IDC_EPISODE_ID: 3800
MDC_IDC_EPISODE_ID: 3801
MDC_IDC_EPISODE_ID: 3802
MDC_IDC_EPISODE_ID: 3803
MDC_IDC_EPISODE_ID: 3804
MDC_IDC_EPISODE_ID: 3805
MDC_IDC_EPISODE_ID: 3806
MDC_IDC_EPISODE_ID: 3807
MDC_IDC_EPISODE_ID: 3808
MDC_IDC_EPISODE_ID: 3809
MDC_IDC_EPISODE_ID: 3810
MDC_IDC_EPISODE_ID: 3811
MDC_IDC_EPISODE_ID: 3812
MDC_IDC_EPISODE_ID: 3813
MDC_IDC_EPISODE_ID: 3814
MDC_IDC_EPISODE_ID: 3815
MDC_IDC_EPISODE_ID: 3816
MDC_IDC_EPISODE_ID: 3817
MDC_IDC_EPISODE_ID: 3818
MDC_IDC_EPISODE_ID: 3819
MDC_IDC_EPISODE_ID: 3820
MDC_IDC_EPISODE_ID: 3821
MDC_IDC_EPISODE_ID: 3822
MDC_IDC_EPISODE_ID: 3823
MDC_IDC_EPISODE_ID: 3824
MDC_IDC_EPISODE_ID: 3825
MDC_IDC_EPISODE_ID: 3826
MDC_IDC_EPISODE_ID: 3827
MDC_IDC_EPISODE_ID: 3828
MDC_IDC_EPISODE_ID: 3829
MDC_IDC_EPISODE_ID: 3830
MDC_IDC_EPISODE_ID: 3831
MDC_IDC_EPISODE_ID: 3832
MDC_IDC_EPISODE_ID: 3833
MDC_IDC_EPISODE_ID: 3834
MDC_IDC_EPISODE_ID: 3835
MDC_IDC_EPISODE_ID: 3836
MDC_IDC_EPISODE_ID: 3837
MDC_IDC_EPISODE_ID: 3838
MDC_IDC_EPISODE_ID: 3839
MDC_IDC_EPISODE_ID: 3840
MDC_IDC_EPISODE_ID: 3841
MDC_IDC_EPISODE_ID: 3842
MDC_IDC_EPISODE_ID: 3843
MDC_IDC_EPISODE_ID: 3844
MDC_IDC_EPISODE_ID: 3845
MDC_IDC_EPISODE_ID: 3846
MDC_IDC_EPISODE_ID: 3847
MDC_IDC_EPISODE_ID: 3848
MDC_IDC_EPISODE_ID: 3849
MDC_IDC_EPISODE_ID: 3850
MDC_IDC_EPISODE_ID: 3851
MDC_IDC_EPISODE_TYPE: NORMAL
MDC_IDC_LEAD_IMPLANT_DT: NORMAL
MDC_IDC_LEAD_IMPLANT_DT: NORMAL
MDC_IDC_LEAD_LOCATION: NORMAL
MDC_IDC_LEAD_LOCATION: NORMAL
MDC_IDC_LEAD_LOCATION_DETAIL_1: NORMAL
MDC_IDC_LEAD_LOCATION_DETAIL_1: NORMAL
MDC_IDC_LEAD_MFG: NORMAL
MDC_IDC_LEAD_MFG: NORMAL
MDC_IDC_LEAD_MODEL: NORMAL
MDC_IDC_LEAD_MODEL: NORMAL
MDC_IDC_LEAD_POLARITY_TYPE: NORMAL
MDC_IDC_LEAD_POLARITY_TYPE: NORMAL
MDC_IDC_LEAD_SERIAL: NORMAL
MDC_IDC_LEAD_SERIAL: NORMAL
MDC_IDC_LEAD_SPECIAL_FUNCTION: 69
MDC_IDC_MSMT_BATTERY_DTM: NORMAL
MDC_IDC_MSMT_BATTERY_REMAINING_LONGEVITY: 143 MO
MDC_IDC_MSMT_BATTERY_RRT_TRIGGER: 2.62
MDC_IDC_MSMT_BATTERY_STATUS: NORMAL
MDC_IDC_MSMT_BATTERY_VOLTAGE: 3.02 V
MDC_IDC_MSMT_LEADCHNL_RA_IMPEDANCE_VALUE: 285 OHM
MDC_IDC_MSMT_LEADCHNL_RA_IMPEDANCE_VALUE: 437 OHM
MDC_IDC_MSMT_LEADCHNL_RA_PACING_THRESHOLD_AMPLITUDE: 0.62 V
MDC_IDC_MSMT_LEADCHNL_RA_PACING_THRESHOLD_PULSEWIDTH: 0.4 MS
MDC_IDC_MSMT_LEADCHNL_RA_SENSING_INTR_AMPL: 1.5 MV
MDC_IDC_MSMT_LEADCHNL_RA_SENSING_INTR_AMPL: 1.5 MV
MDC_IDC_MSMT_LEADCHNL_RV_IMPEDANCE_VALUE: 380 OHM
MDC_IDC_MSMT_LEADCHNL_RV_IMPEDANCE_VALUE: 551 OHM
MDC_IDC_MSMT_LEADCHNL_RV_PACING_THRESHOLD_AMPLITUDE: 0.75 V
MDC_IDC_MSMT_LEADCHNL_RV_PACING_THRESHOLD_PULSEWIDTH: 0.4 MS
MDC_IDC_MSMT_LEADCHNL_RV_SENSING_INTR_AMPL: 22.12 MV
MDC_IDC_MSMT_LEADCHNL_RV_SENSING_INTR_AMPL: 22.12 MV
MDC_IDC_PG_IMPLANT_DTM: NORMAL
MDC_IDC_PG_MFG: NORMAL
MDC_IDC_PG_MODEL: NORMAL
MDC_IDC_PG_SERIAL: NORMAL
MDC_IDC_PG_TYPE: NORMAL
MDC_IDC_SESS_CLINIC_NAME: NORMAL
MDC_IDC_SESS_DTM: NORMAL
MDC_IDC_SESS_TYPE: NORMAL
MDC_IDC_SET_BRADY_AT_MODE_SWITCH_RATE: 171 {BEATS}/MIN
MDC_IDC_SET_BRADY_HYSTRATE: NORMAL
MDC_IDC_SET_BRADY_LOWRATE: 60 {BEATS}/MIN
MDC_IDC_SET_BRADY_MAX_SENSOR_RATE: 130 {BEATS}/MIN
MDC_IDC_SET_BRADY_MAX_TRACKING_RATE: 130 {BEATS}/MIN
MDC_IDC_SET_BRADY_MODE: NORMAL
MDC_IDC_SET_BRADY_PAV_DELAY_LOW: 180 MS
MDC_IDC_SET_BRADY_SAV_DELAY_LOW: 150 MS
MDC_IDC_SET_LEADCHNL_RA_PACING_AMPLITUDE: 1.5 V
MDC_IDC_SET_LEADCHNL_RA_PACING_ANODE_ELECTRODE_1: NORMAL
MDC_IDC_SET_LEADCHNL_RA_PACING_ANODE_LOCATION_1: NORMAL
MDC_IDC_SET_LEADCHNL_RA_PACING_CAPTURE_MODE: NORMAL
MDC_IDC_SET_LEADCHNL_RA_PACING_CATHODE_ELECTRODE_1: NORMAL
MDC_IDC_SET_LEADCHNL_RA_PACING_CATHODE_LOCATION_1: NORMAL
MDC_IDC_SET_LEADCHNL_RA_PACING_POLARITY: NORMAL
MDC_IDC_SET_LEADCHNL_RA_PACING_PULSEWIDTH: 0.4 MS
MDC_IDC_SET_LEADCHNL_RA_SENSING_ANODE_ELECTRODE_1: NORMAL
MDC_IDC_SET_LEADCHNL_RA_SENSING_ANODE_LOCATION_1: NORMAL
MDC_IDC_SET_LEADCHNL_RA_SENSING_CATHODE_ELECTRODE_1: NORMAL
MDC_IDC_SET_LEADCHNL_RA_SENSING_CATHODE_LOCATION_1: NORMAL
MDC_IDC_SET_LEADCHNL_RA_SENSING_POLARITY: NORMAL
MDC_IDC_SET_LEADCHNL_RA_SENSING_SENSITIVITY: 0.15 MV
MDC_IDC_SET_LEADCHNL_RV_PACING_AMPLITUDE: 1.5 V
MDC_IDC_SET_LEADCHNL_RV_PACING_ANODE_ELECTRODE_1: NORMAL
MDC_IDC_SET_LEADCHNL_RV_PACING_ANODE_LOCATION_1: NORMAL
MDC_IDC_SET_LEADCHNL_RV_PACING_CAPTURE_MODE: NORMAL
MDC_IDC_SET_LEADCHNL_RV_PACING_CATHODE_ELECTRODE_1: NORMAL
MDC_IDC_SET_LEADCHNL_RV_PACING_CATHODE_LOCATION_1: NORMAL
MDC_IDC_SET_LEADCHNL_RV_PACING_POLARITY: NORMAL
MDC_IDC_SET_LEADCHNL_RV_PACING_PULSEWIDTH: 0.4 MS
MDC_IDC_SET_LEADCHNL_RV_SENSING_ANODE_ELECTRODE_1: NORMAL
MDC_IDC_SET_LEADCHNL_RV_SENSING_ANODE_LOCATION_1: NORMAL
MDC_IDC_SET_LEADCHNL_RV_SENSING_CATHODE_ELECTRODE_1: NORMAL
MDC_IDC_SET_LEADCHNL_RV_SENSING_CATHODE_LOCATION_1: NORMAL
MDC_IDC_SET_LEADCHNL_RV_SENSING_POLARITY: NORMAL
MDC_IDC_SET_LEADCHNL_RV_SENSING_SENSITIVITY: 0.9 MV
MDC_IDC_SET_ZONE_DETECTION_INTERVAL: 200 MS
MDC_IDC_SET_ZONE_DETECTION_INTERVAL: 350 MS
MDC_IDC_SET_ZONE_DETECTION_INTERVAL: 400 MS
MDC_IDC_SET_ZONE_TYPE: NORMAL
MDC_IDC_STAT_AT_BURDEN_PERCENT: 56.9 %
MDC_IDC_STAT_AT_DTM_END: NORMAL
MDC_IDC_STAT_AT_DTM_START: NORMAL
MDC_IDC_STAT_BRADY_AP_VP_PERCENT: 0.21 %
MDC_IDC_STAT_BRADY_AP_VS_PERCENT: 47.63 %
MDC_IDC_STAT_BRADY_AS_VP_PERCENT: 0.04 %
MDC_IDC_STAT_BRADY_AS_VS_PERCENT: 52.48 %
MDC_IDC_STAT_BRADY_DTM_END: NORMAL
MDC_IDC_STAT_BRADY_DTM_START: NORMAL
MDC_IDC_STAT_BRADY_RA_PERCENT_PACED: 20.79 %
MDC_IDC_STAT_BRADY_RV_PERCENT_PACED: 0.53 %
MDC_IDC_STAT_EPISODE_RECENT_COUNT: 0
MDC_IDC_STAT_EPISODE_RECENT_COUNT: 0
MDC_IDC_STAT_EPISODE_RECENT_COUNT: 14
MDC_IDC_STAT_EPISODE_RECENT_COUNT: 226
MDC_IDC_STAT_EPISODE_RECENT_COUNT: 79
MDC_IDC_STAT_EPISODE_RECENT_COUNT_DTM_END: NORMAL
MDC_IDC_STAT_EPISODE_RECENT_COUNT_DTM_START: NORMAL
MDC_IDC_STAT_EPISODE_TOTAL_COUNT: 0
MDC_IDC_STAT_EPISODE_TOTAL_COUNT: 1
MDC_IDC_STAT_EPISODE_TOTAL_COUNT: 233
MDC_IDC_STAT_EPISODE_TOTAL_COUNT: 2998
MDC_IDC_STAT_EPISODE_TOTAL_COUNT: 564
MDC_IDC_STAT_EPISODE_TOTAL_COUNT_DTM_END: NORMAL
MDC_IDC_STAT_EPISODE_TOTAL_COUNT_DTM_START: NORMAL
MDC_IDC_STAT_EPISODE_TYPE: NORMAL

## 2023-03-21 DIAGNOSIS — I48.0 PAROXYSMAL ATRIAL FIBRILLATION (H): Primary | ICD-10-CM

## 2023-06-15 DIAGNOSIS — I48.0 PAROXYSMAL ATRIAL FIBRILLATION (H): ICD-10-CM

## 2023-06-16 RX ORDER — DILTIAZEM HYDROCHLORIDE 180 MG/1
CAPSULE, EXTENDED RELEASE ORAL
Qty: 180 CAPSULE | Refills: 3 | Status: SHIPPED | OUTPATIENT
Start: 2023-06-16 | End: 2024-01-05

## 2023-06-22 ENCOUNTER — ANCILLARY PROCEDURE (OUTPATIENT)
Dept: CARDIOLOGY | Facility: CLINIC | Age: 88
End: 2023-06-22
Attending: INTERNAL MEDICINE
Payer: COMMERCIAL

## 2023-06-22 DIAGNOSIS — I49.5 SICK SINUS SYNDROME (H): ICD-10-CM

## 2023-06-22 DIAGNOSIS — Z95.0 PACEMAKER: ICD-10-CM

## 2023-06-22 LAB
MDC_IDC_EPISODE_DTM: NORMAL
MDC_IDC_EPISODE_DURATION: 0 S
MDC_IDC_EPISODE_DURATION: 1 S
MDC_IDC_EPISODE_DURATION: 1143 S
MDC_IDC_EPISODE_DURATION: 116 S
MDC_IDC_EPISODE_DURATION: 119 S
MDC_IDC_EPISODE_DURATION: 1210 S
MDC_IDC_EPISODE_DURATION: 1222 S
MDC_IDC_EPISODE_DURATION: 1260 S
MDC_IDC_EPISODE_DURATION: 1264 S
MDC_IDC_EPISODE_DURATION: 1372 S
MDC_IDC_EPISODE_DURATION: 14 S
MDC_IDC_EPISODE_DURATION: 14 S
MDC_IDC_EPISODE_DURATION: 144 S
MDC_IDC_EPISODE_DURATION: 1482 S
MDC_IDC_EPISODE_DURATION: 1488 S
MDC_IDC_EPISODE_DURATION: 156 S
MDC_IDC_EPISODE_DURATION: 1786 S
MDC_IDC_EPISODE_DURATION: 179 S
MDC_IDC_EPISODE_DURATION: 181 S
MDC_IDC_EPISODE_DURATION: 19 S
MDC_IDC_EPISODE_DURATION: 2 S
MDC_IDC_EPISODE_DURATION: 2 S
MDC_IDC_EPISODE_DURATION: 2001 S
MDC_IDC_EPISODE_DURATION: 201 S
MDC_IDC_EPISODE_DURATION: 2343 S
MDC_IDC_EPISODE_DURATION: 2575 S
MDC_IDC_EPISODE_DURATION: 282 S
MDC_IDC_EPISODE_DURATION: 282 S
MDC_IDC_EPISODE_DURATION: 2837 S
MDC_IDC_EPISODE_DURATION: 2969 S
MDC_IDC_EPISODE_DURATION: 3132 S
MDC_IDC_EPISODE_DURATION: 3396 S
MDC_IDC_EPISODE_DURATION: 348 S
MDC_IDC_EPISODE_DURATION: 351 S
MDC_IDC_EPISODE_DURATION: 36 S
MDC_IDC_EPISODE_DURATION: 37 S
MDC_IDC_EPISODE_DURATION: 3740 S
MDC_IDC_EPISODE_DURATION: 431 S
MDC_IDC_EPISODE_DURATION: 461 S
MDC_IDC_EPISODE_DURATION: 4856 S
MDC_IDC_EPISODE_DURATION: 49 S
MDC_IDC_EPISODE_DURATION: 52 S
MDC_IDC_EPISODE_DURATION: 5430 S
MDC_IDC_EPISODE_DURATION: 548 S
MDC_IDC_EPISODE_DURATION: 5496 S
MDC_IDC_EPISODE_DURATION: 561 S
MDC_IDC_EPISODE_DURATION: 5729 S
MDC_IDC_EPISODE_DURATION: 59 S
MDC_IDC_EPISODE_DURATION: 6076 S
MDC_IDC_EPISODE_DURATION: 610 S
MDC_IDC_EPISODE_DURATION: 63 S
MDC_IDC_EPISODE_DURATION: 6693 S
MDC_IDC_EPISODE_DURATION: 687 S
MDC_IDC_EPISODE_DURATION: 69 S
MDC_IDC_EPISODE_DURATION: 7050 S
MDC_IDC_EPISODE_DURATION: 71 S
MDC_IDC_EPISODE_DURATION: 75 S
MDC_IDC_EPISODE_DURATION: 7556 S
MDC_IDC_EPISODE_DURATION: 79 S
MDC_IDC_EPISODE_DURATION: 8225 S
MDC_IDC_EPISODE_DURATION: 84 S
MDC_IDC_EPISODE_DURATION: 859 S
MDC_IDC_EPISODE_DURATION: 89 S
MDC_IDC_EPISODE_DURATION: 8929 S
MDC_IDC_EPISODE_DURATION: 9 S
MDC_IDC_EPISODE_DURATION: 9156 S
MDC_IDC_EPISODE_DURATION: NORMAL S
MDC_IDC_EPISODE_ID: 3881
MDC_IDC_EPISODE_ID: 3883
MDC_IDC_EPISODE_ID: 3902
MDC_IDC_EPISODE_ID: 3945
MDC_IDC_EPISODE_ID: 4025
MDC_IDC_EPISODE_ID: 4288
MDC_IDC_EPISODE_ID: 4289
MDC_IDC_EPISODE_ID: 4301
MDC_IDC_EPISODE_ID: 4304
MDC_IDC_EPISODE_ID: 4347
MDC_IDC_EPISODE_ID: 4440
MDC_IDC_EPISODE_ID: 4504
MDC_IDC_EPISODE_ID: 4522
MDC_IDC_EPISODE_ID: 4624
MDC_IDC_EPISODE_ID: 4629
MDC_IDC_EPISODE_ID: 4630
MDC_IDC_EPISODE_ID: 4633
MDC_IDC_EPISODE_ID: 4634
MDC_IDC_EPISODE_ID: 4635
MDC_IDC_EPISODE_ID: 4636
MDC_IDC_EPISODE_ID: 4639
MDC_IDC_EPISODE_ID: 4640
MDC_IDC_EPISODE_ID: 4641
MDC_IDC_EPISODE_ID: 4645
MDC_IDC_EPISODE_ID: 4646
MDC_IDC_EPISODE_ID: 4647
MDC_IDC_EPISODE_ID: 4648
MDC_IDC_EPISODE_ID: 4649
MDC_IDC_EPISODE_ID: 4650
MDC_IDC_EPISODE_ID: 4651
MDC_IDC_EPISODE_ID: 4652
MDC_IDC_EPISODE_ID: 4653
MDC_IDC_EPISODE_ID: 4654
MDC_IDC_EPISODE_ID: 4656
MDC_IDC_EPISODE_ID: 4657
MDC_IDC_EPISODE_ID: 4659
MDC_IDC_EPISODE_ID: 4686
MDC_IDC_EPISODE_ID: 4757
MDC_IDC_EPISODE_ID: 4758
MDC_IDC_EPISODE_ID: 4759
MDC_IDC_EPISODE_ID: 4760
MDC_IDC_EPISODE_ID: 4761
MDC_IDC_EPISODE_ID: 4762
MDC_IDC_EPISODE_ID: 4763
MDC_IDC_EPISODE_ID: 4764
MDC_IDC_EPISODE_ID: 4765
MDC_IDC_EPISODE_ID: 4766
MDC_IDC_EPISODE_ID: 4767
MDC_IDC_EPISODE_ID: 4768
MDC_IDC_EPISODE_ID: 4769
MDC_IDC_EPISODE_ID: 4770
MDC_IDC_EPISODE_ID: 4771
MDC_IDC_EPISODE_ID: 4772
MDC_IDC_EPISODE_ID: 4773
MDC_IDC_EPISODE_ID: 4774
MDC_IDC_EPISODE_ID: 4775
MDC_IDC_EPISODE_ID: 4776
MDC_IDC_EPISODE_ID: 4777
MDC_IDC_EPISODE_ID: 4778
MDC_IDC_EPISODE_ID: 4779
MDC_IDC_EPISODE_ID: 4780
MDC_IDC_EPISODE_ID: 4781
MDC_IDC_EPISODE_ID: 4782
MDC_IDC_EPISODE_ID: 4783
MDC_IDC_EPISODE_ID: 4784
MDC_IDC_EPISODE_ID: 4785
MDC_IDC_EPISODE_ID: 4786
MDC_IDC_EPISODE_ID: 4787
MDC_IDC_EPISODE_ID: 4788
MDC_IDC_EPISODE_ID: 4789
MDC_IDC_EPISODE_ID: 4790
MDC_IDC_EPISODE_ID: 4791
MDC_IDC_EPISODE_ID: 4792
MDC_IDC_EPISODE_ID: 4793
MDC_IDC_EPISODE_ID: 4794
MDC_IDC_EPISODE_ID: 4795
MDC_IDC_EPISODE_ID: 4796
MDC_IDC_EPISODE_ID: 4797
MDC_IDC_EPISODE_ID: 4798
MDC_IDC_EPISODE_ID: 4799
MDC_IDC_EPISODE_ID: 4800
MDC_IDC_EPISODE_ID: 4801
MDC_IDC_EPISODE_ID: 4802
MDC_IDC_EPISODE_ID: 4803
MDC_IDC_EPISODE_ID: 4804
MDC_IDC_EPISODE_ID: 4805
MDC_IDC_EPISODE_ID: 4806
MDC_IDC_EPISODE_ID: 4807
MDC_IDC_EPISODE_ID: 4808
MDC_IDC_EPISODE_TYPE: NORMAL
MDC_IDC_LEAD_IMPLANT_DT: NORMAL
MDC_IDC_LEAD_IMPLANT_DT: NORMAL
MDC_IDC_LEAD_LOCATION: NORMAL
MDC_IDC_LEAD_LOCATION: NORMAL
MDC_IDC_LEAD_LOCATION_DETAIL_1: NORMAL
MDC_IDC_LEAD_LOCATION_DETAIL_1: NORMAL
MDC_IDC_LEAD_MFG: NORMAL
MDC_IDC_LEAD_MFG: NORMAL
MDC_IDC_LEAD_MODEL: NORMAL
MDC_IDC_LEAD_MODEL: NORMAL
MDC_IDC_LEAD_POLARITY_TYPE: NORMAL
MDC_IDC_LEAD_POLARITY_TYPE: NORMAL
MDC_IDC_LEAD_SERIAL: NORMAL
MDC_IDC_LEAD_SERIAL: NORMAL
MDC_IDC_LEAD_SPECIAL_FUNCTION: 69
MDC_IDC_MSMT_BATTERY_DTM: NORMAL
MDC_IDC_MSMT_BATTERY_REMAINING_LONGEVITY: 139 MO
MDC_IDC_MSMT_BATTERY_RRT_TRIGGER: 2.62
MDC_IDC_MSMT_BATTERY_STATUS: NORMAL
MDC_IDC_MSMT_BATTERY_VOLTAGE: 3.02 V
MDC_IDC_MSMT_LEADCHNL_RA_IMPEDANCE_VALUE: 285 OHM
MDC_IDC_MSMT_LEADCHNL_RA_IMPEDANCE_VALUE: 437 OHM
MDC_IDC_MSMT_LEADCHNL_RA_PACING_THRESHOLD_AMPLITUDE: 0.62 V
MDC_IDC_MSMT_LEADCHNL_RA_PACING_THRESHOLD_PULSEWIDTH: 0.4 MS
MDC_IDC_MSMT_LEADCHNL_RA_SENSING_INTR_AMPL: 1.62 MV
MDC_IDC_MSMT_LEADCHNL_RA_SENSING_INTR_AMPL: 1.62 MV
MDC_IDC_MSMT_LEADCHNL_RV_IMPEDANCE_VALUE: 380 OHM
MDC_IDC_MSMT_LEADCHNL_RV_IMPEDANCE_VALUE: 570 OHM
MDC_IDC_MSMT_LEADCHNL_RV_PACING_THRESHOLD_AMPLITUDE: 0.88 V
MDC_IDC_MSMT_LEADCHNL_RV_PACING_THRESHOLD_PULSEWIDTH: 0.4 MS
MDC_IDC_MSMT_LEADCHNL_RV_SENSING_INTR_AMPL: 20 MV
MDC_IDC_MSMT_LEADCHNL_RV_SENSING_INTR_AMPL: 20 MV
MDC_IDC_PG_IMPLANT_DTM: NORMAL
MDC_IDC_PG_MFG: NORMAL
MDC_IDC_PG_MODEL: NORMAL
MDC_IDC_PG_SERIAL: NORMAL
MDC_IDC_PG_TYPE: NORMAL
MDC_IDC_SESS_CLINIC_NAME: NORMAL
MDC_IDC_SESS_DTM: NORMAL
MDC_IDC_SESS_TYPE: NORMAL
MDC_IDC_SET_BRADY_AT_MODE_SWITCH_RATE: 171 {BEATS}/MIN
MDC_IDC_SET_BRADY_HYSTRATE: NORMAL
MDC_IDC_SET_BRADY_LOWRATE: 60 {BEATS}/MIN
MDC_IDC_SET_BRADY_MAX_SENSOR_RATE: 130 {BEATS}/MIN
MDC_IDC_SET_BRADY_MAX_TRACKING_RATE: 130 {BEATS}/MIN
MDC_IDC_SET_BRADY_MODE: NORMAL
MDC_IDC_SET_BRADY_PAV_DELAY_LOW: 180 MS
MDC_IDC_SET_BRADY_SAV_DELAY_LOW: 150 MS
MDC_IDC_SET_LEADCHNL_RA_PACING_AMPLITUDE: 1.5 V
MDC_IDC_SET_LEADCHNL_RA_PACING_ANODE_ELECTRODE_1: NORMAL
MDC_IDC_SET_LEADCHNL_RA_PACING_ANODE_LOCATION_1: NORMAL
MDC_IDC_SET_LEADCHNL_RA_PACING_CAPTURE_MODE: NORMAL
MDC_IDC_SET_LEADCHNL_RA_PACING_CATHODE_ELECTRODE_1: NORMAL
MDC_IDC_SET_LEADCHNL_RA_PACING_CATHODE_LOCATION_1: NORMAL
MDC_IDC_SET_LEADCHNL_RA_PACING_POLARITY: NORMAL
MDC_IDC_SET_LEADCHNL_RA_PACING_PULSEWIDTH: 0.4 MS
MDC_IDC_SET_LEADCHNL_RA_SENSING_ANODE_ELECTRODE_1: NORMAL
MDC_IDC_SET_LEADCHNL_RA_SENSING_ANODE_LOCATION_1: NORMAL
MDC_IDC_SET_LEADCHNL_RA_SENSING_CATHODE_ELECTRODE_1: NORMAL
MDC_IDC_SET_LEADCHNL_RA_SENSING_CATHODE_LOCATION_1: NORMAL
MDC_IDC_SET_LEADCHNL_RA_SENSING_POLARITY: NORMAL
MDC_IDC_SET_LEADCHNL_RA_SENSING_SENSITIVITY: 0.15 MV
MDC_IDC_SET_LEADCHNL_RV_PACING_AMPLITUDE: 1.5 V
MDC_IDC_SET_LEADCHNL_RV_PACING_ANODE_ELECTRODE_1: NORMAL
MDC_IDC_SET_LEADCHNL_RV_PACING_ANODE_LOCATION_1: NORMAL
MDC_IDC_SET_LEADCHNL_RV_PACING_CAPTURE_MODE: NORMAL
MDC_IDC_SET_LEADCHNL_RV_PACING_CATHODE_ELECTRODE_1: NORMAL
MDC_IDC_SET_LEADCHNL_RV_PACING_CATHODE_LOCATION_1: NORMAL
MDC_IDC_SET_LEADCHNL_RV_PACING_POLARITY: NORMAL
MDC_IDC_SET_LEADCHNL_RV_PACING_PULSEWIDTH: 0.4 MS
MDC_IDC_SET_LEADCHNL_RV_SENSING_ANODE_ELECTRODE_1: NORMAL
MDC_IDC_SET_LEADCHNL_RV_SENSING_ANODE_LOCATION_1: NORMAL
MDC_IDC_SET_LEADCHNL_RV_SENSING_CATHODE_ELECTRODE_1: NORMAL
MDC_IDC_SET_LEADCHNL_RV_SENSING_CATHODE_LOCATION_1: NORMAL
MDC_IDC_SET_LEADCHNL_RV_SENSING_POLARITY: NORMAL
MDC_IDC_SET_LEADCHNL_RV_SENSING_SENSITIVITY: 0.9 MV
MDC_IDC_SET_ZONE_DETECTION_INTERVAL: 200 MS
MDC_IDC_SET_ZONE_DETECTION_INTERVAL: 350 MS
MDC_IDC_SET_ZONE_DETECTION_INTERVAL: 400 MS
MDC_IDC_SET_ZONE_TYPE: NORMAL
MDC_IDC_STAT_AT_BURDEN_PERCENT: 40.8 %
MDC_IDC_STAT_AT_DTM_END: NORMAL
MDC_IDC_STAT_AT_DTM_START: NORMAL
MDC_IDC_STAT_BRADY_AP_VP_PERCENT: 0.19 %
MDC_IDC_STAT_BRADY_AP_VS_PERCENT: 87.83 %
MDC_IDC_STAT_BRADY_AS_VP_PERCENT: 0.03 %
MDC_IDC_STAT_BRADY_AS_VS_PERCENT: 12.18 %
MDC_IDC_STAT_BRADY_DTM_END: NORMAL
MDC_IDC_STAT_BRADY_DTM_START: NORMAL
MDC_IDC_STAT_BRADY_RA_PERCENT_PACED: 52.21 %
MDC_IDC_STAT_BRADY_RV_PERCENT_PACED: 1.55 %
MDC_IDC_STAT_EPISODE_RECENT_COUNT: 0
MDC_IDC_STAT_EPISODE_RECENT_COUNT: 0
MDC_IDC_STAT_EPISODE_RECENT_COUNT: 14
MDC_IDC_STAT_EPISODE_RECENT_COUNT: 72
MDC_IDC_STAT_EPISODE_RECENT_COUNT: 871
MDC_IDC_STAT_EPISODE_RECENT_COUNT_DTM_END: NORMAL
MDC_IDC_STAT_EPISODE_RECENT_COUNT_DTM_START: NORMAL
MDC_IDC_STAT_EPISODE_TOTAL_COUNT: 0
MDC_IDC_STAT_EPISODE_TOTAL_COUNT: 1
MDC_IDC_STAT_EPISODE_TOTAL_COUNT: 247
MDC_IDC_STAT_EPISODE_TOTAL_COUNT: 3869
MDC_IDC_STAT_EPISODE_TOTAL_COUNT: 636
MDC_IDC_STAT_EPISODE_TOTAL_COUNT_DTM_END: NORMAL
MDC_IDC_STAT_EPISODE_TOTAL_COUNT_DTM_START: NORMAL
MDC_IDC_STAT_EPISODE_TYPE: NORMAL

## 2023-06-22 PROCEDURE — 93296 REM INTERROG EVL PM/IDS: CPT | Performed by: INTERNAL MEDICINE

## 2023-06-22 PROCEDURE — 93294 REM INTERROG EVL PM/LDLS PM: CPT | Performed by: INTERNAL MEDICINE

## 2023-07-05 DIAGNOSIS — I48.91 ATRIAL FIBRILLATION WITH RVR (H): ICD-10-CM

## 2023-07-07 RX ORDER — APIXABAN 5 MG/1
TABLET, FILM COATED ORAL
Qty: 180 TABLET | Refills: 2 | Status: SHIPPED | OUTPATIENT
Start: 2023-07-07 | End: 2024-01-05

## 2023-08-06 ENCOUNTER — HEALTH MAINTENANCE LETTER (OUTPATIENT)
Age: 88
End: 2023-08-06

## 2023-09-25 ENCOUNTER — LAB (OUTPATIENT)
Dept: CARDIOLOGY | Facility: CLINIC | Age: 88
End: 2023-09-25
Payer: COMMERCIAL

## 2023-09-25 DIAGNOSIS — I48.0 PAROXYSMAL ATRIAL FIBRILLATION (H): ICD-10-CM

## 2023-09-25 LAB
ALT SERPL W P-5'-P-CCNC: 23 U/L (ref 0–70)
TSH SERPL DL<=0.005 MIU/L-ACNC: 1.47 UIU/ML (ref 0.3–4.2)

## 2023-09-25 PROCEDURE — 36415 COLL VENOUS BLD VENIPUNCTURE: CPT

## 2023-09-25 PROCEDURE — 84460 ALANINE AMINO (ALT) (SGPT): CPT

## 2023-09-25 PROCEDURE — 84443 ASSAY THYROID STIM HORMONE: CPT

## 2023-11-08 DIAGNOSIS — I48.0 PAROXYSMAL ATRIAL FIBRILLATION (H): ICD-10-CM

## 2023-11-08 RX ORDER — DILTIAZEM HYDROCHLORIDE 240 MG/1
CAPSULE, EXTENDED RELEASE ORAL
Qty: 90 CAPSULE | Refills: 0 | Status: SHIPPED | OUTPATIENT
Start: 2023-11-08 | End: 2024-01-05

## 2023-11-14 DIAGNOSIS — Z79.899 ENCOUNTER FOR LONG-TERM (CURRENT) USE OF MEDICATIONS: Primary | ICD-10-CM

## 2023-12-08 ENCOUNTER — DOCUMENTATION ONLY (OUTPATIENT)
Dept: CARDIOLOGY | Facility: CLINIC | Age: 88
End: 2023-12-08

## 2023-12-08 ENCOUNTER — ANCILLARY PROCEDURE (OUTPATIENT)
Dept: CARDIOLOGY | Facility: CLINIC | Age: 88
End: 2023-12-08
Attending: INTERNAL MEDICINE
Payer: COMMERCIAL

## 2023-12-08 DIAGNOSIS — Z95.0 PACEMAKER: ICD-10-CM

## 2023-12-08 DIAGNOSIS — I48.0 PAROXYSMAL ATRIAL FIBRILLATION (H): ICD-10-CM

## 2023-12-08 DIAGNOSIS — I49.5 SICK SINUS SYNDROME (H): Primary | ICD-10-CM

## 2023-12-08 PROCEDURE — 93280 PM DEVICE PROGR EVAL DUAL: CPT | Performed by: INTERNAL MEDICINE

## 2023-12-08 NOTE — PROGRESS NOTES
Encounter Type: Patient seen in clinic for overdue annual device evaluation and iterative programming  Device: Medtronic [place] (D) Pacemaker   Pacing %/Programmed: AP 56% and  1.2% in AAIR<=>DDDR  bpm mode   Lead(s): RA and RV leads with stable measurements & trends   Battery longevity: Estimated at 11 years remaining   Presenting rhythm: A-fib/flutter with VS at 115 bpm   Underlying rhythm: A-fib/flutter (A-rate 230's bpm) with V-rate 115-118 bpm   Heart rates:  bpm but primarily  bpm per histograms with adequate variability & no complaints of activity intolerance; ambulates with a cane  Atrial High rates: Since 6/27/2022- 3073 mode switches, burden of 33%, V-rates >/=120 bpm ~ 50-55%.  Last episode- in progress since 0317am and longest episode >24 hours on 12/2/2023.  EGM's are suggestive of A-fib/flutter/AT with V-rates  bpm.  It appears that there is still some atrial undersensing during mode switch (sensitivity programmed at most sensitive already).  Pt asymptomatic; he states that he feels good overall & has noticed an increased level of energy in the past 6 months.    Anticoagulant: Eliquis         Antiarrhythmic: Amiodarone & Diltiazem   Ventricular High rates: Since 6/27/2022- 132 VHR and 655 Fast A & V episodes logged.  Available/viewed EGM's are suggestive of RVR during A-fib/flutter & AT/paroxysmal SVT (1:1 conduction) with V-rates 148-222 bpm.  Pt asymptomatic, denies feeling fast HR's.    Comments: Normal device function.  AT/AF ATP therapies were disabled on 11/10/2022 because they caused an accelerated ventricular rate.  Atrial ATP was effective 26.1% per device data.  AT/AF ATP therapies programmed to OFF from disabled.  Routing to Dr Castillo and Gracia Ramirez CNP for awareness & clinical management.       Plan: Next scheduled remote on March 11th, 2024- patient is aware and reminder letter was given to him.    Rosy Shah RN

## 2023-12-14 LAB
MDC_IDC_EPISODE_DTM: NORMAL
MDC_IDC_EPISODE_DURATION: 1 S
MDC_IDC_EPISODE_DURATION: 1066 S
MDC_IDC_EPISODE_DURATION: 1106 S
MDC_IDC_EPISODE_DURATION: 117 S
MDC_IDC_EPISODE_DURATION: 120 S
MDC_IDC_EPISODE_DURATION: 132 S
MDC_IDC_EPISODE_DURATION: 135 S
MDC_IDC_EPISODE_DURATION: 1351 S
MDC_IDC_EPISODE_DURATION: 148 S
MDC_IDC_EPISODE_DURATION: 1549 S
MDC_IDC_EPISODE_DURATION: 1576 S
MDC_IDC_EPISODE_DURATION: 181 S
MDC_IDC_EPISODE_DURATION: 189 S
MDC_IDC_EPISODE_DURATION: 190 S
MDC_IDC_EPISODE_DURATION: 2 S
MDC_IDC_EPISODE_DURATION: 22 S
MDC_IDC_EPISODE_DURATION: 22 S
MDC_IDC_EPISODE_DURATION: 2246 S
MDC_IDC_EPISODE_DURATION: 2373 S
MDC_IDC_EPISODE_DURATION: 2403 S
MDC_IDC_EPISODE_DURATION: 242 S
MDC_IDC_EPISODE_DURATION: 2494 S
MDC_IDC_EPISODE_DURATION: 269 S
MDC_IDC_EPISODE_DURATION: 27 S
MDC_IDC_EPISODE_DURATION: 28 S
MDC_IDC_EPISODE_DURATION: 2801 S
MDC_IDC_EPISODE_DURATION: 2892 S
MDC_IDC_EPISODE_DURATION: 318 S
MDC_IDC_EPISODE_DURATION: 3261 S
MDC_IDC_EPISODE_DURATION: 334 S
MDC_IDC_EPISODE_DURATION: 34 S
MDC_IDC_EPISODE_DURATION: 37 S
MDC_IDC_EPISODE_DURATION: 3713 S
MDC_IDC_EPISODE_DURATION: 3731 S
MDC_IDC_EPISODE_DURATION: 4127 S
MDC_IDC_EPISODE_DURATION: 42 S
MDC_IDC_EPISODE_DURATION: 463 S
MDC_IDC_EPISODE_DURATION: 47 S
MDC_IDC_EPISODE_DURATION: 52 S
MDC_IDC_EPISODE_DURATION: 5428 S
MDC_IDC_EPISODE_DURATION: 5835 S
MDC_IDC_EPISODE_DURATION: 6205 S
MDC_IDC_EPISODE_DURATION: 69 S
MDC_IDC_EPISODE_DURATION: 7329 S
MDC_IDC_EPISODE_DURATION: 77 S
MDC_IDC_EPISODE_DURATION: NORMAL S
MDC_IDC_EPISODE_ID: 4939
MDC_IDC_EPISODE_ID: 5081
MDC_IDC_EPISODE_ID: 5356
MDC_IDC_EPISODE_ID: 5357
MDC_IDC_EPISODE_ID: 5358
MDC_IDC_EPISODE_ID: 5359
MDC_IDC_EPISODE_ID: 5360
MDC_IDC_EPISODE_ID: 5361
MDC_IDC_EPISODE_ID: 5362
MDC_IDC_EPISODE_ID: 5363
MDC_IDC_EPISODE_ID: 5364
MDC_IDC_EPISODE_ID: 5365
MDC_IDC_EPISODE_ID: 5366
MDC_IDC_EPISODE_ID: 5367
MDC_IDC_EPISODE_ID: 5368
MDC_IDC_EPISODE_ID: 5369
MDC_IDC_EPISODE_ID: 5370
MDC_IDC_EPISODE_ID: 5371
MDC_IDC_EPISODE_ID: 5372
MDC_IDC_EPISODE_ID: 5373
MDC_IDC_EPISODE_ID: 5374
MDC_IDC_EPISODE_ID: 5375
MDC_IDC_EPISODE_ID: 5376
MDC_IDC_EPISODE_ID: 5377
MDC_IDC_EPISODE_ID: 5378
MDC_IDC_EPISODE_ID: 5379
MDC_IDC_EPISODE_ID: 5380
MDC_IDC_EPISODE_ID: 5381
MDC_IDC_EPISODE_ID: 5382
MDC_IDC_EPISODE_ID: 5383
MDC_IDC_EPISODE_ID: 5384
MDC_IDC_EPISODE_ID: 5385
MDC_IDC_EPISODE_ID: 5386
MDC_IDC_EPISODE_ID: 5387
MDC_IDC_EPISODE_ID: 5388
MDC_IDC_EPISODE_ID: 5389
MDC_IDC_EPISODE_ID: 5390
MDC_IDC_EPISODE_ID: 5391
MDC_IDC_EPISODE_ID: 5392
MDC_IDC_EPISODE_ID: 5393
MDC_IDC_EPISODE_ID: 5394
MDC_IDC_EPISODE_ID: 5395
MDC_IDC_EPISODE_ID: 5396
MDC_IDC_EPISODE_ID: 5397
MDC_IDC_EPISODE_ID: 5398
MDC_IDC_EPISODE_ID: 5399
MDC_IDC_EPISODE_ID: 5400
MDC_IDC_EPISODE_ID: 5401
MDC_IDC_EPISODE_ID: 5402
MDC_IDC_EPISODE_ID: 5403
MDC_IDC_EPISODE_ID: 5404
MDC_IDC_EPISODE_ID: 5405
MDC_IDC_EPISODE_ID: 5406
MDC_IDC_EPISODE_ID: 5407
MDC_IDC_EPISODE_TYPE: NORMAL
MDC_IDC_EPISODE_TYPE_INDUCED: NO
MDC_IDC_LEAD_CONNECTION_STATUS: NORMAL
MDC_IDC_LEAD_CONNECTION_STATUS: NORMAL
MDC_IDC_LEAD_IMPLANT_DT: NORMAL
MDC_IDC_LEAD_IMPLANT_DT: NORMAL
MDC_IDC_LEAD_LOCATION: NORMAL
MDC_IDC_LEAD_LOCATION: NORMAL
MDC_IDC_LEAD_LOCATION_DETAIL_1: NORMAL
MDC_IDC_LEAD_LOCATION_DETAIL_1: NORMAL
MDC_IDC_LEAD_MFG: NORMAL
MDC_IDC_LEAD_MFG: NORMAL
MDC_IDC_LEAD_MODEL: NORMAL
MDC_IDC_LEAD_MODEL: NORMAL
MDC_IDC_LEAD_POLARITY_TYPE: NORMAL
MDC_IDC_LEAD_POLARITY_TYPE: NORMAL
MDC_IDC_LEAD_SERIAL: NORMAL
MDC_IDC_LEAD_SERIAL: NORMAL
MDC_IDC_LEAD_SPECIAL_FUNCTION: 69
MDC_IDC_MSMT_BATTERY_DTM: NORMAL
MDC_IDC_MSMT_BATTERY_REMAINING_LONGEVITY: 135 MO
MDC_IDC_MSMT_BATTERY_RRT_TRIGGER: 2.62
MDC_IDC_MSMT_BATTERY_STATUS: NORMAL
MDC_IDC_MSMT_BATTERY_VOLTAGE: 3.02 V
MDC_IDC_MSMT_LEADCHNL_RA_IMPEDANCE_VALUE: 323 OHM
MDC_IDC_MSMT_LEADCHNL_RA_IMPEDANCE_VALUE: 475 OHM
MDC_IDC_MSMT_LEADCHNL_RA_SENSING_INTR_AMPL: 2.6 MV
MDC_IDC_MSMT_LEADCHNL_RV_IMPEDANCE_VALUE: 380 OHM
MDC_IDC_MSMT_LEADCHNL_RV_IMPEDANCE_VALUE: 551 OHM
MDC_IDC_MSMT_LEADCHNL_RV_PACING_THRESHOLD_AMPLITUDE: 1 V
MDC_IDC_MSMT_LEADCHNL_RV_PACING_THRESHOLD_PULSEWIDTH: 0.4 MS
MDC_IDC_MSMT_LEADCHNL_RV_SENSING_INTR_AMPL: 20 MV
MDC_IDC_PG_IMPLANT_DTM: NORMAL
MDC_IDC_PG_MFG: NORMAL
MDC_IDC_PG_MODEL: NORMAL
MDC_IDC_PG_SERIAL: NORMAL
MDC_IDC_PG_TYPE: NORMAL
MDC_IDC_SESS_CLINIC_NAME: NORMAL
MDC_IDC_SESS_DTM: NORMAL
MDC_IDC_SESS_TYPE: NORMAL
MDC_IDC_SET_BRADY_AT_MODE_SWITCH_RATE: 171 {BEATS}/MIN
MDC_IDC_SET_BRADY_HYSTRATE: NORMAL
MDC_IDC_SET_BRADY_LOWRATE: 60 {BEATS}/MIN
MDC_IDC_SET_BRADY_MAX_SENSOR_RATE: 130 {BEATS}/MIN
MDC_IDC_SET_BRADY_MAX_TRACKING_RATE: 130 {BEATS}/MIN
MDC_IDC_SET_BRADY_MODE: NORMAL
MDC_IDC_SET_BRADY_PAV_DELAY_LOW: 180 MS
MDC_IDC_SET_BRADY_SAV_DELAY_LOW: 150 MS
MDC_IDC_SET_LEADCHNL_RA_PACING_AMPLITUDE: NORMAL
MDC_IDC_SET_LEADCHNL_RA_PACING_ANODE_ELECTRODE_1: NORMAL
MDC_IDC_SET_LEADCHNL_RA_PACING_ANODE_LOCATION_1: NORMAL
MDC_IDC_SET_LEADCHNL_RA_PACING_CAPTURE_MODE: NORMAL
MDC_IDC_SET_LEADCHNL_RA_PACING_CATHODE_ELECTRODE_1: NORMAL
MDC_IDC_SET_LEADCHNL_RA_PACING_CATHODE_LOCATION_1: NORMAL
MDC_IDC_SET_LEADCHNL_RA_PACING_POLARITY: NORMAL
MDC_IDC_SET_LEADCHNL_RA_PACING_PULSEWIDTH: 0.4 MS
MDC_IDC_SET_LEADCHNL_RA_SENSING_ANODE_ELECTRODE_1: NORMAL
MDC_IDC_SET_LEADCHNL_RA_SENSING_ANODE_LOCATION_1: NORMAL
MDC_IDC_SET_LEADCHNL_RA_SENSING_CATHODE_ELECTRODE_1: NORMAL
MDC_IDC_SET_LEADCHNL_RA_SENSING_CATHODE_LOCATION_1: NORMAL
MDC_IDC_SET_LEADCHNL_RA_SENSING_POLARITY: NORMAL
MDC_IDC_SET_LEADCHNL_RA_SENSING_SENSITIVITY: 0.15 MV
MDC_IDC_SET_LEADCHNL_RV_PACING_AMPLITUDE: NORMAL
MDC_IDC_SET_LEADCHNL_RV_PACING_ANODE_ELECTRODE_1: NORMAL
MDC_IDC_SET_LEADCHNL_RV_PACING_ANODE_LOCATION_1: NORMAL
MDC_IDC_SET_LEADCHNL_RV_PACING_CAPTURE_MODE: NORMAL
MDC_IDC_SET_LEADCHNL_RV_PACING_CATHODE_ELECTRODE_1: NORMAL
MDC_IDC_SET_LEADCHNL_RV_PACING_CATHODE_LOCATION_1: NORMAL
MDC_IDC_SET_LEADCHNL_RV_PACING_POLARITY: NORMAL
MDC_IDC_SET_LEADCHNL_RV_PACING_PULSEWIDTH: 0.4 MS
MDC_IDC_SET_LEADCHNL_RV_SENSING_ANODE_ELECTRODE_1: NORMAL
MDC_IDC_SET_LEADCHNL_RV_SENSING_ANODE_LOCATION_1: NORMAL
MDC_IDC_SET_LEADCHNL_RV_SENSING_CATHODE_ELECTRODE_1: NORMAL
MDC_IDC_SET_LEADCHNL_RV_SENSING_CATHODE_LOCATION_1: NORMAL
MDC_IDC_SET_LEADCHNL_RV_SENSING_POLARITY: NORMAL
MDC_IDC_SET_LEADCHNL_RV_SENSING_SENSITIVITY: 0.9 MV
MDC_IDC_SET_ZONE_DETECTION_INTERVAL: 200 MS
MDC_IDC_SET_ZONE_DETECTION_INTERVAL: 350 MS
MDC_IDC_SET_ZONE_DETECTION_INTERVAL: 400 MS
MDC_IDC_SET_ZONE_STATUS: NORMAL
MDC_IDC_SET_ZONE_TYPE: NORMAL
MDC_IDC_SET_ZONE_VENDOR_TYPE: NORMAL
MDC_IDC_STAT_AT_BURDEN_PERCENT: 33 %
MDC_IDC_STAT_AT_DTM_END: NORMAL
MDC_IDC_STAT_AT_DTM_START: NORMAL
MDC_IDC_STAT_AT_MODE_SW_COUNT: 3073
MDC_IDC_STAT_BRADY_AP_VP_PERCENT: 0.15 %
MDC_IDC_STAT_BRADY_AP_VS_PERCENT: 83.21 %
MDC_IDC_STAT_BRADY_AS_VP_PERCENT: 0.02 %
MDC_IDC_STAT_BRADY_AS_VS_PERCENT: 16.86 %
MDC_IDC_STAT_BRADY_DTM_END: NORMAL
MDC_IDC_STAT_BRADY_DTM_START: NORMAL
MDC_IDC_STAT_BRADY_RA_PERCENT_PACED: 55.98 %
MDC_IDC_STAT_BRADY_RV_PERCENT_PACED: 1.24 %
MDC_IDC_STAT_EPISODE_RECENT_COUNT: 0
MDC_IDC_STAT_EPISODE_RECENT_COUNT: 1
MDC_IDC_STAT_EPISODE_RECENT_COUNT: 131
MDC_IDC_STAT_EPISODE_RECENT_COUNT: 3073
MDC_IDC_STAT_EPISODE_RECENT_COUNT: 601
MDC_IDC_STAT_EPISODE_RECENT_COUNT_DTM_END: NORMAL
MDC_IDC_STAT_EPISODE_RECENT_COUNT_DTM_START: NORMAL
MDC_IDC_STAT_EPISODE_TOTAL_COUNT: 0
MDC_IDC_STAT_EPISODE_TOTAL_COUNT: 1
MDC_IDC_STAT_EPISODE_TOTAL_COUNT: 249
MDC_IDC_STAT_EPISODE_TOTAL_COUNT: 4465
MDC_IDC_STAT_EPISODE_TOTAL_COUNT: 637
MDC_IDC_STAT_EPISODE_TOTAL_COUNT_DTM_END: NORMAL
MDC_IDC_STAT_EPISODE_TOTAL_COUNT_DTM_START: NORMAL
MDC_IDC_STAT_EPISODE_TYPE: NORMAL
MDC_IDC_STAT_TACHYTHERAPY_RECENT_DTM_END: NORMAL
MDC_IDC_STAT_TACHYTHERAPY_RECENT_DTM_START: NORMAL
MDC_IDC_STAT_TACHYTHERAPY_TOTAL_DTM_END: NORMAL
MDC_IDC_STAT_TACHYTHERAPY_TOTAL_DTM_START: NORMAL

## 2023-12-14 RX ORDER — METOPROLOL SUCCINATE 50 MG/1
100 TABLET, EXTENDED RELEASE ORAL 2 TIMES DAILY
Qty: 30 TABLET | Refills: 2 | Status: CANCELLED | OUTPATIENT
Start: 2023-12-14

## 2023-12-15 ENCOUNTER — TELEPHONE (OUTPATIENT)
Dept: CARDIOLOGY | Facility: CLINIC | Age: 88
End: 2023-12-15

## 2023-12-15 NOTE — TELEPHONE ENCOUNTER
12/15/2023: Called and Left message on son Petros and son Dory's phone about med change for Daniel. He has moved to [place] and the current number we have won't let us L/M. Sarah Alvarenga, Device RN    12/8/2023:  Pt stated at device check today that he is moving to [place] next week.  He will likely pick a cardiologist & device clinic closer to the [place] is moving to but remains with us in the meantime.  New address below:    [place]      [address] [place] [address]  He didn't want to schedule an OV with Dr Castillo today because of the move, etc.  He is also overdue to see Gracia for AF follow-up.

## 2023-12-18 ENCOUNTER — TELEPHONE (OUTPATIENT)
Dept: CARDIOLOGY | Facility: CLINIC | Age: 88
End: 2023-12-18
Payer: COMMERCIAL

## 2023-12-18 NOTE — TELEPHONE ENCOUNTER
Attempted calling patient to instructed follow up is needed for medication renewal.   No answer and no VM.   Rx faxed back to pharmacy with notation follow up is needed for refills of Diltiazem and Eliquis.

## 2023-12-20 DIAGNOSIS — I48.91 ATRIAL FIBRILLATION WITH RVR (H): ICD-10-CM

## 2023-12-20 DIAGNOSIS — I48.0 PAROXYSMAL ATRIAL FIBRILLATION (H): ICD-10-CM

## 2023-12-20 RX ORDER — AMIODARONE HYDROCHLORIDE 200 MG/1
TABLET ORAL
Qty: 180 TABLET | Refills: 1 | Status: SHIPPED | OUTPATIENT
Start: 2023-12-20 | End: 2024-01-05

## 2024-01-02 ENCOUNTER — TELEPHONE (OUTPATIENT)
Dept: CARDIOLOGY | Facility: CLINIC | Age: 89
End: 2024-01-02
Payer: COMMERCIAL

## 2024-01-02 DIAGNOSIS — I48.0 PAROXYSMAL ATRIAL FIBRILLATION (H): ICD-10-CM

## 2024-01-02 DIAGNOSIS — I48.91 ATRIAL FIBRILLATION WITH RVR (H): ICD-10-CM

## 2024-01-02 NOTE — TELEPHONE ENCOUNTER
[facility]    Phone Message    May a detailed message be left on voicemail: yes     Reason for Call: Medication Refill Request    Has the patient contacted the pharmacy for the refill? Yes   Name of medication being requested: diltiazem ER (DILT-XR) 240 MG 24 hr ER beaded capsule, amiodarone (PACERONE) 200 MG tablet  Provider who prescribed the medication: Dr. Castillo  Pharmacy:  [id number] - [place], [place] - [address]    Date medication is needed: 1/5   Pt currently out    Action Taken: Other: Cardiology    Travel Screening: Not Applicable    Thank you!  [facility]

## 2024-01-04 NOTE — TELEPHONE ENCOUNTER
Left detailed message with my direct number to call and arrange follow up for med refill and continued monitoring of meds.    Of note patient has moved and plans on following with cardiology elsewhere.

## 2024-01-05 RX ORDER — DILTIAZEM HYDROCHLORIDE 240 MG/1
CAPSULE, EXTENDED RELEASE ORAL
Qty: 90 CAPSULE | Refills: 3 | Status: SHIPPED | OUTPATIENT
Start: 2024-01-05

## 2024-01-05 RX ORDER — DILTIAZEM HYDROCHLORIDE 180 MG/1
CAPSULE, EXTENDED RELEASE ORAL
Qty: 180 CAPSULE | Refills: 3 | Status: SHIPPED | OUTPATIENT
Start: 2024-01-05

## 2024-01-05 RX ORDER — AMIODARONE HYDROCHLORIDE 200 MG/1
TABLET ORAL
Qty: 60 TABLET | Refills: 0 | Status: SHIPPED | OUTPATIENT
Start: 2024-01-05 | End: 2024-03-11

## 2024-01-05 NOTE — TELEPHONE ENCOUNTER
PC to patient to inform Diltiazem and Eliquis have been renewed and to follow up as scheduled  2/14/2024 in Afib Clinic. Patient verbalized understanding and has no further questions at this time.

## 2024-01-05 NOTE — TELEPHONE ENCOUNTER
-------------------------------  ----- Message -----   From: Beverly Thakur   Sent: 1/4/2024   3:20 PM CST   To: Seble Wilburn RN   Subject: RE: overdue follow up and refills needed         Hi Seble,     Tosha pt just called us back to schedule. He is scheduled to see Gracia on 2/14, but patient is still worried about not getting refills before then.     Please call him to discuss this with him. The number that was listed was not the correct number. I have updated epic with the correct number. Please call [phone]     Thank you,   Beverly     ----- Message -----   From: Seble Wilburn RN   Sent: 1/3/2024  10:58 AM CST   To: [facility] Device  Pool - [facility]   Subject: overdue follow up and refills needed             Hi,   Please call patient to arrange follow up with Gracia ARANGO or Dr. Castillo. Follow up is way overdue and he is in need of refills. We cannot continue refilling without seeing him.   Thank you,   Seble

## 2024-03-10 DIAGNOSIS — I48.0 PAROXYSMAL ATRIAL FIBRILLATION (H): ICD-10-CM

## 2024-03-10 DIAGNOSIS — I48.91 ATRIAL FIBRILLATION WITH RVR (H): ICD-10-CM

## 2024-03-11 RX ORDER — AMIODARONE HYDROCHLORIDE 200 MG/1
TABLET ORAL
Qty: 60 TABLET | Refills: 0 | Status: SHIPPED | OUTPATIENT
Start: 2024-03-11 | End: 2024-04-22

## 2024-03-29 ENCOUNTER — ANCILLARY PROCEDURE (OUTPATIENT)
Dept: CARDIOLOGY | Facility: CLINIC | Age: 89
End: 2024-03-29
Attending: INTERNAL MEDICINE
Payer: COMMERCIAL

## 2024-03-29 DIAGNOSIS — I49.5 SICK SINUS SYNDROME (H): ICD-10-CM

## 2024-03-29 DIAGNOSIS — Z95.0 PACEMAKER: ICD-10-CM

## 2024-03-29 DIAGNOSIS — I48.0 PAROXYSMAL ATRIAL FIBRILLATION (H): ICD-10-CM

## 2024-03-30 DIAGNOSIS — I48.0 PAROXYSMAL ATRIAL FIBRILLATION (H): Primary | ICD-10-CM

## 2024-03-30 LAB
MDC_IDC_EPISODE_DTM: NORMAL
MDC_IDC_EPISODE_DURATION: 1249 S
MDC_IDC_EPISODE_DURATION: 1441 S
MDC_IDC_EPISODE_DURATION: 171 S
MDC_IDC_EPISODE_DURATION: 1776 S
MDC_IDC_EPISODE_DURATION: 1948 S
MDC_IDC_EPISODE_DURATION: 2055 S
MDC_IDC_EPISODE_DURATION: 217 S
MDC_IDC_EPISODE_DURATION: 2590 S
MDC_IDC_EPISODE_DURATION: 26 S
MDC_IDC_EPISODE_DURATION: 27 S
MDC_IDC_EPISODE_DURATION: 30 S
MDC_IDC_EPISODE_DURATION: 3229 S
MDC_IDC_EPISODE_DURATION: 3488 S
MDC_IDC_EPISODE_DURATION: 3783 S
MDC_IDC_EPISODE_DURATION: 3797 S
MDC_IDC_EPISODE_DURATION: 3858 S
MDC_IDC_EPISODE_DURATION: 4118 S
MDC_IDC_EPISODE_DURATION: 44 S
MDC_IDC_EPISODE_DURATION: 4794 S
MDC_IDC_EPISODE_DURATION: 4874 S
MDC_IDC_EPISODE_DURATION: 5235 S
MDC_IDC_EPISODE_DURATION: 5252 S
MDC_IDC_EPISODE_DURATION: 533 S
MDC_IDC_EPISODE_DURATION: 54 S
MDC_IDC_EPISODE_DURATION: 56 S
MDC_IDC_EPISODE_DURATION: 5661 S
MDC_IDC_EPISODE_DURATION: 57 S
MDC_IDC_EPISODE_DURATION: 5800 S
MDC_IDC_EPISODE_DURATION: 5896 S
MDC_IDC_EPISODE_DURATION: 6012 S
MDC_IDC_EPISODE_DURATION: 6089 S
MDC_IDC_EPISODE_DURATION: 65 S
MDC_IDC_EPISODE_DURATION: 7107 S
MDC_IDC_EPISODE_DURATION: 766 S
MDC_IDC_EPISODE_DURATION: 8325 S
MDC_IDC_EPISODE_DURATION: 8837 S
MDC_IDC_EPISODE_DURATION: 8841 S
MDC_IDC_EPISODE_DURATION: 9021 S
MDC_IDC_EPISODE_DURATION: 998 S
MDC_IDC_EPISODE_DURATION: NORMAL S
MDC_IDC_EPISODE_ID: 5643
MDC_IDC_EPISODE_ID: 5644
MDC_IDC_EPISODE_ID: 5645
MDC_IDC_EPISODE_ID: 5646
MDC_IDC_EPISODE_ID: 5647
MDC_IDC_EPISODE_ID: 5648
MDC_IDC_EPISODE_ID: 5649
MDC_IDC_EPISODE_ID: 5650
MDC_IDC_EPISODE_ID: 5651
MDC_IDC_EPISODE_ID: 5652
MDC_IDC_EPISODE_ID: 5653
MDC_IDC_EPISODE_ID: 5654
MDC_IDC_EPISODE_ID: 5655
MDC_IDC_EPISODE_ID: 5656
MDC_IDC_EPISODE_ID: 5657
MDC_IDC_EPISODE_ID: 5658
MDC_IDC_EPISODE_ID: 5659
MDC_IDC_EPISODE_ID: 5660
MDC_IDC_EPISODE_ID: 5661
MDC_IDC_EPISODE_ID: 5662
MDC_IDC_EPISODE_ID: 5663
MDC_IDC_EPISODE_ID: 5664
MDC_IDC_EPISODE_ID: 5665
MDC_IDC_EPISODE_ID: 5666
MDC_IDC_EPISODE_ID: 5667
MDC_IDC_EPISODE_ID: 5668
MDC_IDC_EPISODE_ID: 5669
MDC_IDC_EPISODE_ID: 5670
MDC_IDC_EPISODE_ID: 5671
MDC_IDC_EPISODE_ID: 5672
MDC_IDC_EPISODE_ID: 5673
MDC_IDC_EPISODE_ID: 5674
MDC_IDC_EPISODE_ID: 5675
MDC_IDC_EPISODE_ID: 5676
MDC_IDC_EPISODE_ID: 5677
MDC_IDC_EPISODE_ID: 5678
MDC_IDC_EPISODE_ID: 5679
MDC_IDC_EPISODE_ID: 5680
MDC_IDC_EPISODE_ID: 5681
MDC_IDC_EPISODE_ID: 5682
MDC_IDC_EPISODE_ID: 5683
MDC_IDC_EPISODE_ID: 5684
MDC_IDC_EPISODE_ID: 5685
MDC_IDC_EPISODE_ID: 5686
MDC_IDC_EPISODE_ID: 5687
MDC_IDC_EPISODE_ID: 5688
MDC_IDC_EPISODE_ID: 5689
MDC_IDC_EPISODE_ID: 5690
MDC_IDC_EPISODE_ID: 5691
MDC_IDC_EPISODE_ID: 5692
MDC_IDC_EPISODE_TYPE: NORMAL
MDC_IDC_LEAD_CONNECTION_STATUS: NORMAL
MDC_IDC_LEAD_CONNECTION_STATUS: NORMAL
MDC_IDC_LEAD_IMPLANT_DT: NORMAL
MDC_IDC_LEAD_IMPLANT_DT: NORMAL
MDC_IDC_LEAD_LOCATION: NORMAL
MDC_IDC_LEAD_LOCATION: NORMAL
MDC_IDC_LEAD_LOCATION_DETAIL_1: NORMAL
MDC_IDC_LEAD_LOCATION_DETAIL_1: NORMAL
MDC_IDC_LEAD_MFG: NORMAL
MDC_IDC_LEAD_MFG: NORMAL
MDC_IDC_LEAD_MODEL: NORMAL
MDC_IDC_LEAD_MODEL: NORMAL
MDC_IDC_LEAD_POLARITY_TYPE: NORMAL
MDC_IDC_LEAD_POLARITY_TYPE: NORMAL
MDC_IDC_LEAD_SERIAL: NORMAL
MDC_IDC_LEAD_SERIAL: NORMAL
MDC_IDC_LEAD_SPECIAL_FUNCTION: 69
MDC_IDC_MSMT_BATTERY_DTM: NORMAL
MDC_IDC_MSMT_BATTERY_REMAINING_LONGEVITY: 130 MO
MDC_IDC_MSMT_BATTERY_RRT_TRIGGER: 2.62
MDC_IDC_MSMT_BATTERY_STATUS: NORMAL
MDC_IDC_MSMT_BATTERY_VOLTAGE: 3.02 V
MDC_IDC_MSMT_LEADCHNL_RA_IMPEDANCE_VALUE: 304 OHM
MDC_IDC_MSMT_LEADCHNL_RA_IMPEDANCE_VALUE: 418 OHM
MDC_IDC_MSMT_LEADCHNL_RA_PACING_THRESHOLD_AMPLITUDE: 0.5 V
MDC_IDC_MSMT_LEADCHNL_RA_PACING_THRESHOLD_PULSEWIDTH: 0.4 MS
MDC_IDC_MSMT_LEADCHNL_RA_SENSING_INTR_AMPL: 1.12 MV
MDC_IDC_MSMT_LEADCHNL_RA_SENSING_INTR_AMPL: 1.12 MV
MDC_IDC_MSMT_LEADCHNL_RV_IMPEDANCE_VALUE: 380 OHM
MDC_IDC_MSMT_LEADCHNL_RV_IMPEDANCE_VALUE: 532 OHM
MDC_IDC_MSMT_LEADCHNL_RV_PACING_THRESHOLD_AMPLITUDE: 1 V
MDC_IDC_MSMT_LEADCHNL_RV_PACING_THRESHOLD_PULSEWIDTH: 0.4 MS
MDC_IDC_MSMT_LEADCHNL_RV_SENSING_INTR_AMPL: 20.25 MV
MDC_IDC_MSMT_LEADCHNL_RV_SENSING_INTR_AMPL: 20.25 MV
MDC_IDC_PG_IMPLANT_DTM: NORMAL
MDC_IDC_PG_MFG: NORMAL
MDC_IDC_PG_MODEL: NORMAL
MDC_IDC_PG_SERIAL: NORMAL
MDC_IDC_PG_TYPE: NORMAL
MDC_IDC_SESS_CLINIC_NAME: NORMAL
MDC_IDC_SESS_DTM: NORMAL
MDC_IDC_SESS_TYPE: NORMAL
MDC_IDC_SET_BRADY_AT_MODE_SWITCH_RATE: 171 {BEATS}/MIN
MDC_IDC_SET_BRADY_HYSTRATE: NORMAL
MDC_IDC_SET_BRADY_LOWRATE: 60 {BEATS}/MIN
MDC_IDC_SET_BRADY_MAX_SENSOR_RATE: 130 {BEATS}/MIN
MDC_IDC_SET_BRADY_MAX_TRACKING_RATE: 130 {BEATS}/MIN
MDC_IDC_SET_BRADY_MODE: NORMAL
MDC_IDC_SET_BRADY_PAV_DELAY_LOW: 180 MS
MDC_IDC_SET_BRADY_SAV_DELAY_LOW: 150 MS
MDC_IDC_SET_LEADCHNL_RA_PACING_AMPLITUDE: 1.5 V
MDC_IDC_SET_LEADCHNL_RA_PACING_ANODE_ELECTRODE_1: NORMAL
MDC_IDC_SET_LEADCHNL_RA_PACING_ANODE_LOCATION_1: NORMAL
MDC_IDC_SET_LEADCHNL_RA_PACING_CAPTURE_MODE: NORMAL
MDC_IDC_SET_LEADCHNL_RA_PACING_CATHODE_ELECTRODE_1: NORMAL
MDC_IDC_SET_LEADCHNL_RA_PACING_CATHODE_LOCATION_1: NORMAL
MDC_IDC_SET_LEADCHNL_RA_PACING_POLARITY: NORMAL
MDC_IDC_SET_LEADCHNL_RA_PACING_PULSEWIDTH: 0.4 MS
MDC_IDC_SET_LEADCHNL_RA_SENSING_ANODE_ELECTRODE_1: NORMAL
MDC_IDC_SET_LEADCHNL_RA_SENSING_ANODE_LOCATION_1: NORMAL
MDC_IDC_SET_LEADCHNL_RA_SENSING_CATHODE_ELECTRODE_1: NORMAL
MDC_IDC_SET_LEADCHNL_RA_SENSING_CATHODE_LOCATION_1: NORMAL
MDC_IDC_SET_LEADCHNL_RA_SENSING_POLARITY: NORMAL
MDC_IDC_SET_LEADCHNL_RA_SENSING_SENSITIVITY: 0.15 MV
MDC_IDC_SET_LEADCHNL_RV_PACING_AMPLITUDE: 1.5 V
MDC_IDC_SET_LEADCHNL_RV_PACING_ANODE_ELECTRODE_1: NORMAL
MDC_IDC_SET_LEADCHNL_RV_PACING_ANODE_LOCATION_1: NORMAL
MDC_IDC_SET_LEADCHNL_RV_PACING_CAPTURE_MODE: NORMAL
MDC_IDC_SET_LEADCHNL_RV_PACING_CATHODE_ELECTRODE_1: NORMAL
MDC_IDC_SET_LEADCHNL_RV_PACING_CATHODE_LOCATION_1: NORMAL
MDC_IDC_SET_LEADCHNL_RV_PACING_POLARITY: NORMAL
MDC_IDC_SET_LEADCHNL_RV_PACING_PULSEWIDTH: 0.4 MS
MDC_IDC_SET_LEADCHNL_RV_SENSING_ANODE_ELECTRODE_1: NORMAL
MDC_IDC_SET_LEADCHNL_RV_SENSING_ANODE_LOCATION_1: NORMAL
MDC_IDC_SET_LEADCHNL_RV_SENSING_CATHODE_ELECTRODE_1: NORMAL
MDC_IDC_SET_LEADCHNL_RV_SENSING_CATHODE_LOCATION_1: NORMAL
MDC_IDC_SET_LEADCHNL_RV_SENSING_POLARITY: NORMAL
MDC_IDC_SET_LEADCHNL_RV_SENSING_SENSITIVITY: 0.9 MV
MDC_IDC_SET_ZONE_DETECTION_INTERVAL: 200 MS
MDC_IDC_SET_ZONE_DETECTION_INTERVAL: 350 MS
MDC_IDC_SET_ZONE_DETECTION_INTERVAL: 400 MS
MDC_IDC_SET_ZONE_STATUS: NORMAL
MDC_IDC_SET_ZONE_TYPE: NORMAL
MDC_IDC_SET_ZONE_VENDOR_TYPE: NORMAL
MDC_IDC_STAT_AT_BURDEN_PERCENT: 12.6 %
MDC_IDC_STAT_AT_DTM_END: NORMAL
MDC_IDC_STAT_AT_DTM_START: NORMAL
MDC_IDC_STAT_BRADY_AP_VP_PERCENT: 0.09 %
MDC_IDC_STAT_BRADY_AP_VS_PERCENT: 88.58 %
MDC_IDC_STAT_BRADY_AS_VP_PERCENT: 0.01 %
MDC_IDC_STAT_BRADY_AS_VS_PERCENT: 11.37 %
MDC_IDC_STAT_BRADY_DTM_END: NORMAL
MDC_IDC_STAT_BRADY_DTM_START: NORMAL
MDC_IDC_STAT_BRADY_RA_PERCENT_PACED: 77.56 %
MDC_IDC_STAT_BRADY_RV_PERCENT_PACED: 1.69 %
MDC_IDC_STAT_EPISODE_RECENT_COUNT: 0
MDC_IDC_STAT_EPISODE_RECENT_COUNT: 72
MDC_IDC_STAT_EPISODE_RECENT_COUNT_DTM_END: NORMAL
MDC_IDC_STAT_EPISODE_RECENT_COUNT_DTM_START: NORMAL
MDC_IDC_STAT_EPISODE_TOTAL_COUNT: 0
MDC_IDC_STAT_EPISODE_TOTAL_COUNT: 1
MDC_IDC_STAT_EPISODE_TOTAL_COUNT: 249
MDC_IDC_STAT_EPISODE_TOTAL_COUNT: 4750
MDC_IDC_STAT_EPISODE_TOTAL_COUNT: 637
MDC_IDC_STAT_EPISODE_TOTAL_COUNT_DTM_END: NORMAL
MDC_IDC_STAT_EPISODE_TOTAL_COUNT_DTM_START: NORMAL
MDC_IDC_STAT_EPISODE_TYPE: NORMAL
MDC_IDC_STAT_TACHYTHERAPY_RECENT_DTM_END: NORMAL
MDC_IDC_STAT_TACHYTHERAPY_RECENT_DTM_START: NORMAL
MDC_IDC_STAT_TACHYTHERAPY_TOTAL_DTM_END: NORMAL
MDC_IDC_STAT_TACHYTHERAPY_TOTAL_DTM_START: NORMAL

## 2024-03-30 PROCEDURE — 93296 REM INTERROG EVL PM/IDS: CPT | Performed by: INTERNAL MEDICINE

## 2024-03-30 PROCEDURE — 93294 REM INTERROG EVL PM/LDLS PM: CPT | Performed by: INTERNAL MEDICINE

## 2024-03-30 NOTE — PROGRESS NOTES
Thank you, Dr. Castillo, for asking the [facility] team to see Mr. Daniel Sawant to evaluate PAF/device check.    Assessment/Recommendations     Assessment/Plan:    Diagnoses and all orders for this visit:  Paroxysmal atrial fibrillation (H)  On Amiodarone therapy  Diagnosed with paroxysmal atrial fibrillation 2015  Sx: Unsteady gait.  Denies palpitations, CP or syncope. Reports chronic LE swelling.  Rhythm control: Amiodarone 200 mg daily + Diltiazem 420 mg daily + Metoprolol tartrate 75 mg BID (Amiodarone initiated on 2/8/23)  12 lead EKG today shows electronic atrial pacemaker RBBB 64 bpm  ms QT/QTC: 466/480ms  Recent device check shows a paced V sensed 80 bpm.  Since 2/11/2024, 72 mode switches to suggest paroxysmal atrial fibrillation, total AF burden of 12.6% noted.  V rate burden of 15%.  When compared to his device check 1 year ago, he has had a significant reduction in his AF burden, previous AF burden 57%, previous V rate burden of 90%.  He has been on amiodarone now for just over 1 year.  Denies any vision changes, respiratory status, tremors or changes with gait.  He does report occasional palpitations that are more noticeable at night along with chronic bilateral lower extremity swelling, left greater than right.  Swelling could be related to high dose Diltiazem.       [id number] score of 3 for age over 75 and hypertension and on Eliquis 5 mg BID. No bleeding issues reported. No missed doses.     Cardiac pacemaker in situ  -underwent permanent pacemaker placement 8 February 2021     Device check remote 3/29/24  Device: [employer] (D) pacemaker  Pacing % /Programmed: AP 78%,  2%, AAIR-DDDR 60-130bpm  Lead(s): Stable  Battery longevity: Estimating 10.8 years remaining  Presenting: APVS 80bpm  Atrial high rates: Since 2/11/24, 72 mode switches logged, EGM's suggest AF, burden 12.6%, v-rate >/=120bpm ~ 15%.  Anticoagulant: Eliquis  Ventricular High rates: None  Comments:  Normal device function.   Plan: Remote device check scheduled for 7/10/24    Essential hypertension  -/84, recheck 10 minutes later, 146/80 right arm using regular adult cuff    PLAN:   No medication changes made today  Continue Amiodarone 200 mg daily + Diltiazem 420 mg daily + Metoprolol tartrate 75 mg BID for rhythm control  Check CMP, TSH, Amiodarone level  Follow up with me again in one year for PAF/device check  Follow up with Dr Castillo, overdue to see (last evaluated in 2022)     History of Present Illness/Subjective     Daniel Sawant is a very pleasant 89 year old male who comes in today for EP follow up PAF/device check      Daniel Sawant has a known history of paroxysmal atrial fibrillation, hypertension, tachybradycardia syndrome, 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    Arrhythmia hx  Dx/date: Diagnosed with paroxysmal atrial fibrillation 2015  Sx: Unsteady gait.  Denies palpitations, CP or syncope. Reports chronic LE swelling.   Prior AAD, AV na blocking agents: Sotalol, discontinued due to bradycardia. Diltiazem ER, Metoprolol tartrate. Amiodarone initiated 2/2023  OAC: Eliquis  Procedures  DCCV: no  Ablation: no    Lang remains on a rhythm control strategy consisting of amiodarone 200 mg daily + diltiazem extended release 420 mg daily  + metoprolol tartrate 75 mg twice daily.  Recent device check shows a paced V sensed at 80 bpm.  AF burden 12.6%, V rate burden 15%.  No ventricular arrhythmias noted.  Device is functioning appropriately with normal lead impedance.  When compared to 1 year ago, his AF burden went from 57% down to 12.6%, V rate burden went from 90% to 15% since initiation of amiodarone.  He notes occasional palpitations that are more prominent at night, with bilateral lower extremity swelling which is chronic, left greater than right.  He denies any chest pain, shortness of breath on exertion or at rest,  decreased activity tolerance, lightheadedness or syncope.  He does have unsteady balance but he states that was present prior to amiodarone initiation, he reports that this has not gotten any worse since the initiation of amiodarone.  He denies any tremors or vision changes since introduction of amiodarone.  He did sustain 1 fall in January, landing on the left hip, this did not result in a fracture but he was moderately bruised.  He does use a walker and/or wheelchair to assist with stability now.  He has had no additional falls since January.  He remains on Eliquis twice daily for stroke prevention with no bleeding issues and no missed doses.  Blood pressure mildly elevated today.  His last TTE in 2021 showed LVEF of 50% and severe biatrial enlargement.  He did have moderate tricuspid valve regurgitation at that time.      Cardiographics (reviewed):  EKG shows electronic atrial pacemaker RBBB 64 bpm  ms QT/QTC: 466/480ms  EKG today 2/8/2023 shows Atrial fibrillation with RVR, PVC's, ventricular rate 126 bpm, QT/QTC: 290/420, dual chamber pacemaker in place.    EKG 2/6/2021 shows sinus bradycardia with PAC's, right BBB QT/QTC: 470/465     Device check remote 3/29/24  Device: [employer] Van [employer]eter (D) pacemaker  Pacing % /Programmed: AP 78%,  2%, AAIR-DDDR 60-130bpm  Lead(s): Stable  Battery longevity: Estimating 10.8 years remaining  Presenting: APVS 80bpm  Atrial high rates: Since 2/11/24, 72 mode switches logged, EGM's suggest AF, burden 12.6%, v-rate >/=120bpm ~ 15%.  Anticoagulant: Eliquis  Ventricular High rates: None  Comments: Normal device function.   Plan: Remote device check scheduled for 7/10/24      Cardiac testing personally reviewed: Device check completed 2/7/2023  Device: [employer] Van Meter (D) pacemaker  Pacing % /Programmed: AP 20.8%,  0.5%, AAIR-DDDR 60-130bpm  Lead(s): Stable  Battery longevity: Estimating 11.9 years remaining  Presenting: AF/-215bpm  Atrial arrhythmia: 226 mode switches  logged, available EGM's from 2/6/23 suggest AF w/ -159bpm, burden 56.9%, v-rate >/=120bpm ~ 90%.   Anticoagulant: Eliquis  Ventricular arrhythmia: 14 VHR and 82 Fast A&V episodes logged, EGM's suggest AF w/ -207bpm.  Device/Lead alerts: NA  Comments: Normal device function.  Plan: Remote device check scheduled for 4/19/23. Scheduled to see Gracia Ramirez NP 2/8/23.  Minda Nowak RN     Echo result w/o MOPS:  2/4/2021       Left ventricular ejection fraction is low normal. The calculated left ventricular ejection fraction is 50%.    Normal left ventricular cavity size and wall thickness.    The right ventricle is moderately dilated. The systolic function is mildly reduced.    Severe biatrial enlargement.    Moderate tricuspid valve regurgitation.    Moderate pulmonary hypertension present. The estimated systolic pulmonary artery pressure is 43 mm Hg.    When compared to the previous study dated 8/14/2015, findings are overall similar. Right ventricular size may have increased slightly, but systolic function and degree of tricuspid regurgitaition appear similar.           Problem List:  Patient Active Problem List   Diagnosis    Atrial fibrillation with RVR (H)    Hypervolemia, unspecified hypervolemia type    Lower extremity edema    Essential hypertension    Bradycardia    Tachycardia-bradycardia syndrome (H)    Tachy-paras syndrome (H)    Cardiac pacemaker in situ    Paroxysmal atrial fibrillation (H)     Revi  e  Physical Examination Review of Systems   w [facility]  There were no vitals taken for this visit.  There is no height or weight on file to calculate BMI.  Wt Readings from Last 3 Encounters:   02/08/23 71.4 kg (157 lb 8 oz)   06/27/22 72.2 kg (159 lb 3.2 oz)   05/05/21 69.9 kg (154 lb)     General Appearance:   Alert, well-appearing and in no acute distress.   HEENT: Atraumatic, normocephalic.  No scleral icterus, normal conjunctivae; mucous membranes pink and moist.     Chest: Chest  symmetric, spine straight.   Lungs:   Respirations unlabored: Lungs are clear to auscultation.   Cardiovascular:   Normal first and second heart sounds with no murmurs, rubs, or gallops.  Regular, regular.   Normal JVD, chronic BLE 2+ pitting edema, left greater than right.        Extremities: No cyanosis or clubbing   Musculoskeletal: Moves all extremities   Skin: Warm, dry, intact.    Neurologic: Mood and affect are appropriate, alert and oriented to person, place, time, and situation, mild unsteady gait noted. Cautious with ambulation.      ROS: 10 point ROS neg other than the symptoms noted above in the HPI.     Medical History  Surgical History Family History Social History     Past Medical History:   Diagnosis Date    Arthritis     bilateral hands    Atrial fibrillation (H)     Atrial flutter (H)     Cancer (H)     skin cancer    Gout     Hypertension     Past Surgical History:   Procedure Laterality Date    BIOPSY SKIN (LOCATION)      to remove skin cancer    CIRCUMCISION      EP PACEMAKER INSERT N/A 2/8/2021    Procedure: EP Pacemaker Insertion;  Surgeon: Bautista Umana MD;  Location: [facility];  Service: Cardiology    HERNIA REPAIR Right 1988    inquinal hernia    TONSILLECTOMY      No family history on file. History   Smoking Status    Never   Smokeless Tobacco    Never     Social History    Substance and Sexual Activity      Alcohol use: No        Comment: Alcoholic Drinks/day: very little       Medications  Allergies     Current Outpatient Medications   Medication Sig Dispense Refill    amiodarone (PACERONE) 200 MG tablet Take one tablet three times daily for 2 weeks starting on 2/8/2023, then twice daily for two weeks starting 2/23/2023 then once daily going forward. 60 tablet 0    apixaban ANTICOAGULANT (ELIQUIS ANTICOAGULANT) 5 MG tablet Take 1 tablet (5 mg) by mouth 2 times daily 180 tablet 2    arginine HCl, L-arginine, 500 mg cap [ARGININE HCL, L-ARGININE, 500 MG CAP] Take 500 mg  "by mouth 2 (two) times a day.       diltiazem ER (DILT-XR) 180 MG 24 hr capsule Take 2 capsules by mouth once daily 180 capsule 3    diltiazem ER (DILT-XR) 240 MG 24 hr ER beaded capsule TAKE 240 MG CAPSULE BY MOUTH WITH  MG FOR A TOTAL DAILY DOSE  MG ONCE DAILY. 90 capsule 3    DOCOSAHEXANOIC ACID/EPA (FISH OIL ORAL) Take 500 mg by mouth 3 times daily      furosemide (LASIX) 40 MG tablet [FUROSEMIDE (LASIX) 40 MG TABLET] Take 1 tablet (40 mg total) by mouth daily. 30 tablet 0    grape seed extract (GRAPE SEED ORAL) [GRAPE SEED EXTRACT (GRAPE SEED ORAL)] Take 1 tablet by mouth daily.      lactase (LACTAID) 3,000 unit tablet [LACTASE (LACTAID) 3,000 UNIT TABLET] Take 1 tablet (3,000 Units total) by mouth 3 (three) times a day with meals.  0    levOCARNitine (L-CARNITINE) 500 mg Tab [LEVOCARNITINE (L-CARNITINE) 500 MG TAB] Take 500 mg by mouth daily.      metoprolol tartrate (LOPRESSOR) 50 MG tablet Take 1.5 tablets (75 mg) by mouth 2 times daily 270 tablet 3    multivitamin with minerals (THERA-M) 9 mg iron-400 mcg Tab tablet [MULTIVITAMIN WITH MINERALS (THERA-M) 9 MG IRON-400 MCG TAB TABLET] Take 1 tablet by mouth daily.      UNABLE TO FIND Take 3 tablets by mouth daily        Allergies   Allergen Reactions    Simvastatin      Other reaction(s): muscle aches      Medical, surgical, family, social history, and medications were all reviewed and updated as necessary.   Lab Results    Chemistry/lipid CBC Cardiac Enzymes/BNP/TSH/INR   Recent Labs   Lab Test 04/18/22  0926   CHOL 225*   HDL 53   *   TRIG 169*     Recent Labs   Lab Test 04/18/22  0926 04/11/19  1010 03/29/18  1027   * 135* 102     Recent Labs   Lab Test 04/18/22  0926      POTASSIUM 4.0   CHLORIDE 103   CO2 31      BUN 13   CR 0.87   GFRESTIMATED 84   DARCY 9.4     Recent Labs   Lab Test 04/18/22  0926 02/18/21  1108 02/09/21  0444   CR 0.87 0.88 0.95     No results for input(s): \"A1C\" in the last 72000 hours.       " Recent Labs   Lab Test 02/09/21  0444 02/07/21  0429   WBC  --  8.8   HGB  --  13.4*   HCT  --  39.9*   MCV  --  94    155     Recent Labs   Lab Test 02/07/21  0429 02/05/21  2241 02/05/21  0339   HGB 13.4* 13.2* 13.1*    Recent Labs   Lab Test 02/05/21  2241 02/05/21  0339 02/03/21  1303   TROPONINI 0.03 0.04 0.04     Recent Labs   Lab Test 02/03/21  1303   *     Recent Labs   Lab Test 09/25/23  0913   TSH 1.47     Recent Labs   Lab Test 02/04/21  0530   INR 1.33*          Total Time- 38 minutes spent on date of encounter doing chart review, history and exam, documentation and further activities as noted above.  This note has been dictated using voice recognition software. Any grammatical, typographical, or context distortions are unintentional and inherent to the software.    Gracia Ramirez [facility]  [phone]

## 2024-04-01 ENCOUNTER — OFFICE VISIT (OUTPATIENT)
Dept: CARDIOLOGY | Facility: CLINIC | Age: 89
End: 2024-04-01
Attending: INTERNAL MEDICINE
Payer: COMMERCIAL

## 2024-04-01 VITALS
HEART RATE: 88 BPM | BODY MASS INDEX: 25 KG/M2 | DIASTOLIC BLOOD PRESSURE: 80 MMHG | SYSTOLIC BLOOD PRESSURE: 146 MMHG | OXYGEN SATURATION: 97 % | WEIGHT: 162 LBS

## 2024-04-01 DIAGNOSIS — I10 ESSENTIAL HYPERTENSION: ICD-10-CM

## 2024-04-01 DIAGNOSIS — Z95.0 CARDIAC PACEMAKER IN SITU: ICD-10-CM

## 2024-04-01 DIAGNOSIS — I48.0 PAROXYSMAL ATRIAL FIBRILLATION (H): Primary | ICD-10-CM

## 2024-04-01 DIAGNOSIS — Z79.899 ON AMIODARONE THERAPY: ICD-10-CM

## 2024-04-01 LAB
ALBUMIN SERPL BCG-MCNC: 4.4 G/DL (ref 3.5–5.2)
ALP SERPL-CCNC: 76 U/L (ref 40–150)
ALT SERPL W P-5'-P-CCNC: 32 U/L (ref 0–70)
ANION GAP SERPL CALCULATED.3IONS-SCNC: 8 MMOL/L (ref 7–15)
AST SERPL W P-5'-P-CCNC: 28 U/L (ref 0–45)
BILIRUB SERPL-MCNC: 0.5 MG/DL
BUN SERPL-MCNC: 16 MG/DL (ref 8–23)
CALCIUM SERPL-MCNC: 9.8 MG/DL (ref 8.8–10.2)
CHLORIDE SERPL-SCNC: 102 MMOL/L (ref 98–107)
CREAT SERPL-MCNC: 1.06 MG/DL (ref 0.67–1.17)
DEPRECATED HCO3 PLAS-SCNC: 32 MMOL/L (ref 22–29)
EGFRCR SERPLBLD CKD-EPI 2021: 67 ML/MIN/1.73M2
GLUCOSE SERPL-MCNC: 111 MG/DL (ref 70–99)
POTASSIUM SERPL-SCNC: 4.4 MMOL/L (ref 3.4–5.3)
PROT SERPL-MCNC: 7.3 G/DL (ref 6.4–8.3)
SODIUM SERPL-SCNC: 142 MMOL/L (ref 135–145)
TSH SERPL DL<=0.005 MIU/L-ACNC: 1.51 UIU/ML (ref 0.3–4.2)

## 2024-04-01 PROCEDURE — 80053 COMPREHEN METABOLIC PANEL: CPT | Performed by: NURSE PRACTITIONER

## 2024-04-01 PROCEDURE — 84443 ASSAY THYROID STIM HORMONE: CPT | Performed by: NURSE PRACTITIONER

## 2024-04-01 PROCEDURE — 93000 ELECTROCARDIOGRAM COMPLETE: CPT | Performed by: INTERNAL MEDICINE

## 2024-04-01 PROCEDURE — 99214 OFFICE O/P EST MOD 30 MIN: CPT | Performed by: NURSE PRACTITIONER

## 2024-04-01 PROCEDURE — 99000 SPECIMEN HANDLING OFFICE-LAB: CPT | Performed by: NURSE PRACTITIONER

## 2024-04-01 PROCEDURE — 36415 COLL VENOUS BLD VENIPUNCTURE: CPT | Performed by: NURSE PRACTITIONER

## 2024-04-01 PROCEDURE — 80151 DRUG ASSAY AMIODARONE: CPT | Mod: 90 | Performed by: NURSE PRACTITIONER

## 2024-04-01 NOTE — LETTER
4/1/2024    Pepe Villeda MD [facility] [address]  [facility] [address]    RE: Daniel Sawant       Dear Colleague,     I had the pleasure of seeing Daniel Sawant in the [facility].    Thank you, Dr. Castillo, for asking the [facility] team to see . Daniel Sawant to evaluate PAF/device check.    Assessment/Recommendations     Assessment/Plan:    Diagnoses and all orders for this visit:  Paroxysmal atrial fibrillation (H)  On Amiodarone therapy  Diagnosed with paroxysmal atrial fibrillation 2015  Sx: Unsteady gait.  Denies palpitations, CP or syncope. Reports chronic LE swelling.  Rhythm control: Amiodarone 200 mg daily + Diltiazem 420 mg daily + Metoprolol tartrate 75 mg BID (Amiodarone initiated on 2/8/23)  12 lead EKG today shows electronic atrial pacemaker RBBB 64 bpm  ms QT/QTC: 466/480ms  Recent device check shows a paced V sensed 80 bpm.  Since 2/11/2024, 72 mode switches to suggest paroxysmal atrial fibrillation, total AF burden of 12.6% noted.  V rate burden of 15%.  When compared to his device check 1 year ago, he has had a significant reduction in his AF burden, previous AF burden 57%, previous V rate burden of 90%.  He has been on amiodarone now for just over 1 year.  Denies any vision changes, respiratory status, tremors or changes with gait.  He does report occasional palpitations that are more noticeable at night along with chronic bilateral lower extremity swelling, left greater than right.  Swelling could be related to high dose Diltiazem.       [id number] score of 3 for age over 75 and hypertension and on Eliquis 5 mg BID. No bleeding issues reported. No missed doses.     Cardiac pacemaker in situ  -underwent permanent pacemaker placement 8 February 2021     Device check remote 3/29/24  Device: [employer] [place] (D) pacemaker  Pacing % /Programmed: AP 78%,  2%, AAIR-DDDR 60-130bpm  Lead(s): Stable  Battery longevity:  Estimating 10.8 years remaining  Presenting: APVS 80bpm  Atrial high rates: Since 2/11/24, 72 mode switches logged, EGM's suggest AF, burden 12.6%, v-rate >/=120bpm ~ 15%.  Anticoagulant: Eliquis  Ventricular High rates: None  Comments: Normal device function.   Plan: Remote device check scheduled for 7/10/24    Essential hypertension  -/84, recheck 10 minutes later, 146/80 right arm using regular adult cuff    PLAN:   No medication changes made today  Continue Amiodarone 200 mg daily + Diltiazem 420 mg daily + Metoprolol tartrate 75 mg BID for rhythm control  Check CMP, TSH, Amiodarone level  Follow up with me again in one year for PAF/device check  Follow up with Dr Castillo, overdue to see (last evaluated in 2022)     History of Present Illness/Subjective     Daniel Sawant is a very pleasant 89 year old male who comes in today for EP follow up PAF/device check      Daniel Sawant has a known history of paroxysmal atrial fibrillation, hypertension, tachybradycardia syndrome, 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    Arrhythmia hx  Dx/date: Diagnosed with paroxysmal atrial fibrillation 2015  Sx: Unsteady gait.  Denies palpitations, CP or syncope. Reports chronic LE swelling.   Prior AAD, AV na blocking agents: Sotalol, discontinued due to bradycardia. Diltiazem ER, Metoprolol tartrate. Amiodarone initiated 2/2023  OAC: Eliquis  Procedures  DCCV: no  Ablation: no    Lang remains on a rhythm control strategy consisting of amiodarone 200 mg daily + diltiazem extended release 420 mg daily  + metoprolol tartrate 75 mg twice daily.  Recent device check shows a paced V sensed at 80 bpm.  AF burden 12.6%, V rate burden 15%.  No ventricular arrhythmias noted.  Device is functioning appropriately with normal lead impedance.  When compared to 1 year ago, his AF burden went from 57% down to 12.6%, V rate burden went from 90% to 15% since initiation of  amiodarone.  He notes occasional palpitations that are more prominent at night, with bilateral lower extremity swelling which is chronic, left greater than right.  He denies any chest pain, shortness of breath on exertion or at rest, decreased activity tolerance, lightheadedness or syncope.  He does have unsteady balance but he states that was present prior to amiodarone initiation, he reports that this has not gotten any worse since the initiation of amiodarone.  He denies any tremors or vision changes since introduction of amiodarone.  He did sustain 1 fall in January, landing on the left hip, this did not result in a fracture but he was moderately bruised.  He does use a walker and/or wheelchair to assist with stability now.  He has had no additional falls since January.  He remains on Eliquis twice daily for stroke prevention with no bleeding issues and no missed doses.  Blood pressure mildly elevated today.  His last TTE in 2021 showed LVEF of 50% and severe biatrial enlargement.  He did have moderate tricuspid valve regurgitation at that time.      Cardiographics (reviewed):  EKG shows electronic atrial pacemaker RBBB 64 bpm  ms QT/QTC: 466/480ms  EKG today 2/8/2023 shows Atrial fibrillation with RVR, PVC's, ventricular rate 126 bpm, QT/QTC: 290/420, dual chamber pacemaker in place.    EKG 2/6/2021 shows sinus bradycardia with PAC's, right BBB QT/QTC: 470/465     Device check remote 3/29/24  Device: [place] (D) pacemaker  Pacing % /Programmed: AP 78%,  2%, AAIR-DDDR 60-130bpm  Lead(s): Stable  Battery longevity: Estimating 10.8 years remaining  Presenting: APVS 80bpm  Atrial high rates: Since 2/11/24, 72 mode switches logged, EGM's suggest AF, burden 12.6%, v-rate >/=120bpm ~ 15%.  Anticoagulant: Eliquis  Ventricular High rates: None  Comments: Normal device function.   Plan: Remote device check scheduled for 7/10/24      Cardiac testing personally reviewed: Device check completed  2/7/2023  Device: [employer] [place] (D) pacemaker  Pacing % /Programmed: AP 20.8%,  0.5%, AAIR-DDDR 60-130bpm  Lead(s): Stable  Battery longevity: Estimating 11.9 years remaining  Presenting: AF/-215bpm  Atrial arrhythmia: 226 mode switches logged, available EGM's from 2/6/23 suggest AF w/ -159bpm, burden 56.9%, v-rate >/=120bpm ~ 90%.   Anticoagulant: Eliquis  Ventricular arrhythmia: 14 VHR and 82 Fast A&V episodes logged, EGM's suggest AF w/ -207bpm.  Device/Lead alerts: NA  Comments: Normal device function.  Plan: Remote device check scheduled for 4/19/23. Scheduled to see Gracia Ramirez NP 2/8/23.  Minda Nowak RN     Echo result w/o MOPS:  2/4/2021       Left ventricular ejection fraction is low normal. The calculated left ventricular ejection fraction is 50%.    Normal left ventricular cavity size and wall thickness.    The right ventricle is moderately dilated. The systolic function is mildly reduced.    Severe biatrial enlargement.    Moderate tricuspid valve regurgitation.    Moderate pulmonary hypertension present. The estimated systolic pulmonary artery pressure is 43 mm Hg.    When compared to the previous study dated 8/14/2015, findings are overall similar. Right ventricular size may have increased slightly, but systolic function and degree of tricuspid regurgitaition appear similar.           Problem List:  Patient Active Problem List   Diagnosis    Atrial fibrillation with RVR (H)    Hypervolemia, unspecified hypervolemia type    Lower extremity edema    Essential hypertension    Bradycardia    Tachycardia-bradycardia syndrome (H)    Tachy-paras syndrome (H)    Cardiac pacemaker in situ    Paroxysmal atrial fibrillation (H)     Revi  e  Physical Examination Review of Systems   w [facility]  There were no vitals taken for this visit.  There is no height or weight on file to calculate BMI.  Wt Readings from Last 3 Encounters:   02/08/23 71.4 kg (157 lb 8 oz)   06/27/22 72.2 kg (159  lb 3.2 oz)   05/05/21 69.9 kg (154 lb)     General Appearance:   Alert, well-appearing and in no acute distress.   HEENT: Atraumatic, normocephalic.  No scleral icterus, normal conjunctivae; mucous membranes pink and moist.     Chest: Chest symmetric, spine straight.   Lungs:   Respirations unlabored: Lungs are clear to auscultation.   Cardiovascular:   Normal first and second heart sounds with no murmurs, rubs, or gallops.  Regular, regular.   Normal JVD, chronic BLE 2+ pitting edema, left greater than right.        Extremities: No cyanosis or clubbing   Musculoskeletal: Moves all extremities   Skin: Warm, dry, intact.    Neurologic: Mood and affect are appropriate, alert and oriented to person, place, time, and situation, mild unsteady gait noted. Cautious with ambulation.      ROS: 10 point ROS neg other than the symptoms noted above in the HPI.     Medical History  Surgical History Family History Social History     Past Medical History:   Diagnosis Date    Arthritis     bilateral hands    Atrial fibrillation (H)     Atrial flutter (H)     Cancer (H)     skin cancer    Gout     Hypertension     Past Surgical History:   Procedure Laterality Date    BIOPSY SKIN (LOCATION)      to remove skin cancer    CIRCUMCISION      EP PACEMAKER INSERT N/A 2/8/2021    Procedure: EP Pacemaker Insertion;  Surgeon: Bautista Umana MD;  Location: [facility] Cardiac Cath Lab;  Service: Cardiology    HERNIA REPAIR Right 1988    inquinal hernia    TONSILLECTOMY      No family history on file. History   Smoking Status    Never   Smokeless Tobacco    Never     Social History    Substance and Sexual Activity      Alcohol use: No        Comment: Alcoholic Drinks/day: very little       Medications  Allergies     Current Outpatient Medications   Medication Sig Dispense Refill    amiodarone (PACERONE) 200 MG tablet Take one tablet three times daily for 2 weeks starting on 2/8/2023, then twice daily for two weeks starting 2/23/2023 then once  daily going forward. 60 tablet 0    apixaban ANTICOAGULANT (ELIQUIS ANTICOAGULANT) 5 MG tablet Take 1 tablet (5 mg) by mouth 2 times daily 180 tablet 2    arginine HCl, L-arginine, 500 mg cap [ARGININE HCL, L-ARGININE, 500 MG CAP] Take 500 mg by mouth 2 (two) times a day.       diltiazem ER (DILT-XR) 180 MG 24 hr capsule Take 2 capsules by mouth once daily 180 capsule 3    diltiazem ER (DILT-XR) 240 MG 24 hr ER beaded capsule TAKE 240 MG CAPSULE BY MOUTH WITH  MG FOR A TOTAL DAILY DOSE  MG ONCE DAILY. 90 capsule 3    DOCOSAHEXANOIC ACID/EPA (FISH OIL ORAL) Take 500 mg by mouth 3 times daily      furosemide (LASIX) 40 MG tablet [FUROSEMIDE (LASIX) 40 MG TABLET] Take 1 tablet (40 mg total) by mouth daily. 30 tablet 0    grape seed extract (GRAPE SEED ORAL) [GRAPE SEED EXTRACT (GRAPE SEED ORAL)] Take 1 tablet by mouth daily.      lactase (LACTAID) 3,000 unit tablet [LACTASE (LACTAID) 3,000 UNIT TABLET] Take 1 tablet (3,000 Units total) by mouth 3 (three) times a day with meals.  0    levOCARNitine (L-CARNITINE) 500 mg Tab [LEVOCARNITINE (L-CARNITINE) 500 MG TAB] Take 500 mg by mouth daily.      metoprolol tartrate (LOPRESSOR) 50 MG tablet Take 1.5 tablets (75 mg) by mouth 2 times daily 270 tablet 3    multivitamin with minerals (THERA-M) 9 mg iron-400 mcg Tab tablet [MULTIVITAMIN WITH MINERALS (THERA-M) 9 MG IRON-400 MCG TAB TABLET] Take 1 tablet by mouth daily.      UNABLE TO FIND Take 3 tablets by mouth daily        Allergies   Allergen Reactions    Simvastatin      Other reaction(s): muscle aches      Medical, surgical, family, social history, and medications were all reviewed and updated as necessary.   Lab Results    Chemistry/lipid CBC Cardiac Enzymes/BNP/TSH/INR   Recent Labs   Lab Test 04/18/22 0926   CHOL 225*   HDL 53   *   TRIG 169*     Recent Labs   Lab Test 04/18/22 0926 04/11/19  1010 03/29/18  1027   * 135* 102     Recent Labs   Lab Test 04/18/22 0926      POTASSIUM  "4.0   CHLORIDE 103   CO2 31      BUN 13   CR 0.87   GFRESTIMATED 84   DARCY 9.4     Recent Labs   Lab Test 04/18/22  0926 02/18/21  1108 02/09/21  0444   CR 0.87 0.88 0.95     No results for input(s): \"A1C\" in the last 61642 hours.       Recent Labs   Lab Test 02/09/21  0444 02/07/21  0429   WBC  --  8.8   HGB  --  13.4*   HCT  --  39.9*   MCV  --  94    155     Recent Labs   Lab Test 02/07/21  0429 02/05/21  2241 02/05/21  0339   HGB 13.4* 13.2* 13.1*    Recent Labs   Lab Test 02/05/21  2241 02/05/21  0339 02/03/21  1303   TROPONINI 0.03 0.04 0.04     Recent Labs   Lab Test 02/03/21  1303   *     Recent Labs   Lab Test 09/25/23  0913   TSH 1.47     Recent Labs   Lab Test 02/04/21  0530   INR 1.33*          Total Time- 38 minutes spent on date of encounter doing chart review, history and exam, documentation and further activities as noted above.  This note has been dictated using voice recognition software. Any grammatical, typographical, or context distortions are unintentional and inherent to the software.    Gracia Ramirez CNP  [facility]  [phone]                         Thank you for allowing me to participate in the care of your patient.      Sincerely,     Gracia Ramirez NP     [facility]  cc:   Esperanza Castillo MD  [address] [address]      "

## 2024-04-01 NOTE — PATIENT INSTRUCTIONS
Daniel Sawant,    It was a pleasure to see you today at the [facility].     My recommendations after this visit include:    No medication changes made today  Continue Amiodarone 200 mg daily + Diltiazem 420 mg daily + Metoprolol tartrate 75 mg BID  Continue on Eliquis for stroke prevention  Check thyroid, liver function and amiodarone level today  Follow up with me again in one year for Afib and device check  Follow up with Dr Castillo, overdue to see, last evaluated in 2022    Gracia Ramirez CNP  [facility], Electrophysiology  [phone]  EP nurses [phone]

## 2024-04-02 LAB
ATRIAL RATE - MUSE: 64 BPM
DIASTOLIC BLOOD PRESSURE - MUSE: NORMAL MMHG
INTERPRETATION ECG - MUSE: NORMAL
P AXIS - MUSE: 10 DEGREES
PR INTERVAL - MUSE: 138 MS
QRS DURATION - MUSE: 148 MS
QT - MUSE: 466 MS
QTC - MUSE: 480 MS
R AXIS - MUSE: 8 DEGREES
SYSTOLIC BLOOD PRESSURE - MUSE: NORMAL MMHG
T AXIS - MUSE: 9 DEGREES
VENTRICULAR RATE- MUSE: 64 BPM

## 2024-04-05 LAB
AMIODARONE SERPL-MCNC: 1.2 UG/ML
DESETHYLAMIODARONE SERPL-MCNC: 0.8 UG/ML

## 2024-04-22 DIAGNOSIS — I48.91 ATRIAL FIBRILLATION WITH RVR (H): ICD-10-CM

## 2024-04-22 DIAGNOSIS — I48.0 PAROXYSMAL ATRIAL FIBRILLATION (H): ICD-10-CM

## 2024-04-22 RX ORDER — AMIODARONE HYDROCHLORIDE 200 MG/1
TABLET ORAL
Qty: 90 TABLET | Refills: 3 | Status: SHIPPED | OUTPATIENT
Start: 2024-04-22

## 2024-04-26 NOTE — PROGRESS NOTES
HEART CARE ENCOUNTER NOTE      [facility]  [phone]      Assessment/Recommendations   Assessment:   Paroxysmal atrial fibrillation: On Eliquis 5 mg twice daily for anticoagulation.  On amiodarone 200 mg daily, diltiazem 420 mg daily, metoprolol tartrate 75 mg twice daily.  Patient follows with A-fib clinic and was seen earlier this month for amiodarone monitoring.  Patient denying any side effects from amiodarone.  Patient is asymptomatic with his atrial fibrillation denying ever feeling palpitations.  Hypertension: Well-controlled  Sick sinus syndrome: Status post insertion of dual-chamber permanent pacemaker placement February 2021.  Last remote device check showed normal device function.      Plan:   Continue current medications  Continue device checks  Continue staying active      Follow up in 18 months with Dr. Castillo  Follow-up with Gracia Ramirez in A-fib clinic in 1 year       History of Present Illness/Subjective    HPI: Daniel Sawant is a 89 year old male with PMHx of atrial fibrillation, sick sinus syndrome status post permanent pacemaker placement 2/2021, hypertension presents for follow-up.  Patient last saw Dr. Castillo 6/27/2022 where he had been doing well despite intermittent episodes of atrial fibrillation on device checks.  Patient follows with A-fib clinic and recently saw Gracia Ramirez NP where patient had been doing well and no medication changes were made.    Patient notes that he has been doing well and denies any cardiac concerns.  Does note having 1 fall in January from turning too fast but denies feeling lightheaded or dizzy.  Did not hit his head and denies any major bruising.  Patient notes that he lives in an independent living facility and gets meals there.  Notes that he walks with his walker throughout the facility as well as using an exercise machine for his arms and legs going for 1 mile once a day.  Denies any exertional angina or dyspnea.  Patient  has chronic swelling of his lower extremities, left worse than right and notes that it has been slightly improved recently.  Patient denies any palpitations or feelings of his atrial fibrillation.        Echocardiogram 2/4/21 Results:  Normal left ventricular size with low normal left ventricular systolic performance.  Ejection fraction 50%.  Moderate tricuspid insufficiency.  Moderate right ventricular enlargement with mildly reduced right ventricular systolic performance.  Severe bi-atrial enlargement.      EKG 4/1/2024:  Electronic atrial pacemaker   Right bundle branch block   Abnormal ECG   When compared with ECG of 08-FEB-2023 12:56,   Electronic atrial pacemaker has replaced Atrial fibrillation   Vent. rate has decreased BY  62 BPM   Confirmed by BIRGIT LLANOS MD LOC: ([id number]) on 4/2/2024 1:37:42 PM   Ventricular rate 64  QTc 480    Remote device check 3/29/2024:  Encounter Type: Routine remote device check (IN PREP FOR JOE RODRIGUEZ, NP OFFICE VISIT 4/1/24)  Device: [place] (D) pacemaker  Pacing % /Programmed: AP 78%,  2%, AAIR-DDDR 60-130bpm  Lead(s): Stable  Battery longevity: Estimating 10.8 years remaining  Presenting: APVS 80bpm  Atrial high rates: Since 2/11/24, 72 mode switches logged, EGM's suggest AF, burden 12.6%, v-rate >/=120bpm ~ 15%.  Anticoagulant: Eliquis  Ventricular High rates: None  Comments: Normal device function.   Plan: Remote device check scheduled for 7/10/24. Minda Nowak RN     Physical Examination  Review of Systems   Vitals: /70 (BP Location: Right arm, Patient Position: Sitting, Cuff Size: Adult Regular)   Pulse 68   Resp 20   Wt 70.8 kg (156 lb)   BMI 24.07 kg/m    BMI= Body mass index is 24.07 kg/m .  Wt Readings from Last 3 Encounters:   04/29/24 70.8 kg (156 lb)   04/01/24 73.5 kg (162 lb)   02/08/23 71.4 kg (157 lb 8 oz)           ENT/Mouth: membranes moist, no oral lesions or bleeding gums.      EYES:  no scleral icterus, normal conjunctivae        Chest/Lungs:   lungs are clear to auscultation, no rales or wheezing,  equal chest wall expansion    Cardiovascular:   Irregularly irregular normal first and second heart sounds with no murmurs, rubs, or gallops; the carotid, radial and posterior tibial pulses are intact,  no edema bilaterally        Extremities: no cyanosis or clubbing   Skin: no xanthelasma, warm.    Neurologic: no tremors     Psychiatric: alert and oriented x3, calm        Please refer above for cardiac ROS details.        Medical History  Surgical History Family History Social History   Past Medical History:   Diagnosis Date    Arthritis     bilateral hands    Atrial fibrillation (H)     Atrial flutter (H)     Cancer (H)     skin cancer    Gout     Hypertension      Past Surgical History:   Procedure Laterality Date    BIOPSY SKIN (LOCATION)      to remove skin cancer    CIRCUMCISION      EP PACEMAKER INSERT N/A 2/8/2021    Procedure: EP Pacemaker Insertion;  Surgeon: Bautista Umana MD;  Location: [facility] Cardiac Cath Lab;  Service: Cardiology    HERNIA REPAIR Right 1988    inquinal hernia    TONSILLECTOMY       No family history on file.     Social History     Socioeconomic History    Marital status:      Spouse name: Not on file    Number of children: Not on file    Years of education: Not on file    Highest education level: Not on file   Occupational History    Not on file   Tobacco Use    Smoking status: Never    Smokeless tobacco: Never   Substance and Sexual Activity    Alcohol use: No     Comment: Alcoholic Drinks/day: very little    Drug use: No    Sexual activity: Not on file   Other Topics Concern    Not on file   Social History Narrative    Not on file     Social Determinants of Health     Financial Resource Strain: High Risk (1/1/2022)    Received from [facility], [facility]    Financial Resource Strain     Difficulty of Paying Living Expenses: Not on  file     Difficulty of Paying Living Expenses: Not on file   Food Insecurity: Not on file   Transportation Needs: Not on file   Physical Activity: Not on file   Stress: Not on file   Social Connections: Unknown (1/1/2022)    [facility]    Social Connections     Frequency of Communication with Friends and Family: Not on file   Interpersonal Safety: Not on file   Housing Stability: Not on file           Medications  Allergies   Current Outpatient Medications   Medication Sig Dispense Refill    amiodarone (PACERONE) 200 MG tablet TAKE 1 TAB ONCE DAILY 90 tablet 3    apixaban ANTICOAGULANT (ELIQUIS ANTICOAGULANT) 5 MG tablet Take 1 tablet (5 mg) by mouth 2 times daily 180 tablet 2    arginine HCl, L-arginine, 500 mg cap [ARGININE HCL, L-ARGININE, 500 MG CAP] Take 500 mg by mouth 2 (two) times a day.       diltiazem ER (DILT-XR) 180 MG 24 hr capsule Take 2 capsules by mouth once daily 180 capsule 3    diltiazem ER (DILT-XR) 240 MG 24 hr ER beaded capsule TAKE 240 MG CAPSULE BY MOUTH WITH  MG FOR A TOTAL DAILY DOSE  MG ONCE DAILY. 90 capsule 3    DOCOSAHEXANOIC ACID/EPA (FISH OIL ORAL) Take 500 mg by mouth 3 times daily      furosemide (LASIX) 40 MG tablet [FUROSEMIDE (LASIX) 40 MG TABLET] Take 1 tablet (40 mg total) by mouth daily. 30 tablet 0    grape seed extract (GRAPE SEED ORAL) [GRAPE SEED EXTRACT (GRAPE SEED ORAL)] Take 1 tablet by mouth daily.      lactase (LACTAID) 3,000 unit tablet [LACTASE (LACTAID) 3,000 UNIT TABLET] Take 1 tablet (3,000 Units total) by mouth 3 (three) times a day with meals.  0    levOCARNitine (L-CARNITINE) 500 mg Tab [LEVOCARNITINE (L-CARNITINE) 500 MG TAB] Take 500 mg by mouth daily.      metoprolol tartrate (LOPRESSOR) 50 MG tablet Take 1.5 tablets (75 mg) by mouth 2 times daily 270 tablet 3    multivitamin with minerals (THERA-M) 9 mg iron-400 mcg Tab tablet [MULTIVITAMIN WITH  "MINERALS (THERA-M) 9 MG IRON-400 MCG TAB TABLET] Take 1 tablet by mouth daily.      UNABLE TO FIND Take 3 tablets by mouth daily         Allergies   Allergen Reactions    Simvastatin      Other reaction(s): muscle aches          Lab Results    Chemistry/lipid CBC Cardiac Enzymes/BNP/TSH/INR   Recent Labs   Lab Test 04/18/22  0926   CHOL 225*   HDL 53   *   TRIG 169*     Recent Labs   Lab Test 04/18/22  0926 04/11/19  1010 03/29/18  1027   * 135* 102     Recent Labs   Lab Test 04/01/24  1503      POTASSIUM 4.4   CHLORIDE 102   CO2 32*   *   BUN 16.0   CR 1.06   GFRESTIMATED 67   DARCY 9.8     Recent Labs   Lab Test 04/01/24  1503 04/18/22  0926 02/18/21  1108   CR 1.06 0.87 0.88     No results for input(s): \"A1C\" in the last 84195 hours.       Recent Labs   Lab Test 02/09/21  0444 02/07/21  0429   WBC  --  8.8   HGB  --  13.4*   HCT  --  39.9*   MCV  --  94    155     Recent Labs   Lab Test 02/07/21  0429 02/05/21  2241 02/05/21  0339   HGB 13.4* 13.2* 13.1*    Recent Labs   Lab Test 02/05/21  2241 02/05/21  0339 02/03/21  1303   TROPONINI 0.03 0.04 0.04     Recent Labs   Lab Test 02/03/21  1303   *     Recent Labs   Lab Test 04/01/24  1503   TSH 1.51     Recent Labs   Lab Test 02/04/21  0530   INR 1.33*          Alla Garcia PA-C                                       "

## 2024-04-29 ENCOUNTER — OFFICE VISIT (OUTPATIENT)
Dept: CARDIOLOGY | Facility: CLINIC | Age: 89
End: 2024-04-29
Attending: NURSE PRACTITIONER
Payer: COMMERCIAL

## 2024-04-29 VITALS
BODY MASS INDEX: 24.07 KG/M2 | DIASTOLIC BLOOD PRESSURE: 70 MMHG | SYSTOLIC BLOOD PRESSURE: 112 MMHG | RESPIRATION RATE: 20 BRPM | WEIGHT: 156 LBS | HEART RATE: 68 BPM

## 2024-04-29 DIAGNOSIS — I10 ESSENTIAL HYPERTENSION: ICD-10-CM

## 2024-04-29 DIAGNOSIS — Z79.899 ON AMIODARONE THERAPY: ICD-10-CM

## 2024-04-29 DIAGNOSIS — I48.0 PAROXYSMAL ATRIAL FIBRILLATION (H): ICD-10-CM

## 2024-04-29 DIAGNOSIS — Z95.0 CARDIAC PACEMAKER IN SITU: ICD-10-CM

## 2024-04-29 PROCEDURE — G2211 COMPLEX E/M VISIT ADD ON: HCPCS | Performed by: STUDENT IN AN ORGANIZED HEALTH CARE EDUCATION/TRAINING PROGRAM

## 2024-04-29 PROCEDURE — 99214 OFFICE O/P EST MOD 30 MIN: CPT | Performed by: STUDENT IN AN ORGANIZED HEALTH CARE EDUCATION/TRAINING PROGRAM

## 2024-04-29 NOTE — PATIENT INSTRUCTIONS
Daniel Sawant,    It was a pleasure to see you today at the [facility].     My recommendations after this visit include:    - Continue current medications.   - Continue device checks  - Continue staying active  - Follow up with Gracia Ramirez CNP in afib clinic in 1 year  - Follow up with Dr. Castillo in 18 months, sooner if needed    - Please call [phone], if you have any questions or concerns      Alla Garcia PA-C

## 2024-04-29 NOTE — LETTER
4/29/2024    Physician No Ref-Primary  No address on file    RE: Daniel Sawant       Dear Colleague,     I had the pleasure of seeing Daniel Sawant in the [facility].    HEART CARE ENCOUNTER NOTE      [facility]  [phone]      Assessment/Recommendations   Assessment:   Paroxysmal atrial fibrillation: On Eliquis 5 mg twice daily for anticoagulation.  On amiodarone 200 mg daily, diltiazem 420 mg daily, metoprolol tartrate 75 mg twice daily.  Patient follows with A-fib clinic and was seen earlier this month for amiodarone monitoring.  Patient denying any side effects from amiodarone.  Patient is asymptomatic with his atrial fibrillation denying ever feeling palpitations.  Hypertension: Well-controlled  Sick sinus syndrome: Status post insertion of dual-chamber permanent pacemaker placement February 2021.  Last remote device check showed normal device function.      Plan:   Continue current medications  Continue device checks  Continue staying active      Follow up in 18 months with Dr. Castillo  Follow-up with Gracia Ramirez in A-fib clinic in 1 year       History of Present Illness/Subjective    HPI: Daniel Sawant is a 89 year old male with PMHx of atrial fibrillation, sick sinus syndrome status post permanent pacemaker placement 2/2021, hypertension presents for follow-up.  Patient last saw Dr. Castillo 6/27/2022 where he had been doing well despite intermittent episodes of atrial fibrillation on device checks.  Patient follows with A-fib clinic and recently saw Gracia Ramirez NP where patient had been doing well and no medication changes were made.    Patient notes that he has been doing well and denies any cardiac concerns.  Does note having 1 fall in January from turning too fast but denies feeling lightheaded or dizzy.  Did not hit his head and denies any major bruising.  Patient notes that he lives in an independent living facility and gets meals there.  Notes that  he walks with his walker throughout the facility as well as using an exercise machine for his arms and legs going for 1 mile once a day.  Denies any exertional angina or dyspnea.  Patient has chronic swelling of his lower extremities, left worse than right and notes that it has been slightly improved recently.  Patient denies any palpitations or feelings of his atrial fibrillation.        Echocardiogram 2/4/21 Results:  Normal left ventricular size with low normal left ventricular systolic performance.  Ejection fraction 50%.  Moderate tricuspid insufficiency.  Moderate right ventricular enlargement with mildly reduced right ventricular systolic performance.  Severe bi-atrial enlargement.      EKG 4/1/2024:  Electronic atrial pacemaker   Right bundle branch block   Abnormal ECG   When compared with ECG of 08-FEB-2023 12:56,   Electronic atrial pacemaker has replaced Atrial fibrillation   Vent. rate has decreased BY  62 BPM   Confirmed by BIRGIT LLANOS MD LOC:[facility] ([id number]) on 4/2/2024 1:37:42 PM   Ventricular rate 64  QTc 480    Remote device check 3/29/2024:  Encounter Type: Routine remote device check (IN PREP FOR JOE RODRIGUEZ NP OFFICE VISIT 4/1/24)  Device: [employer] (D) pacemaker  Pacing % /Programmed: AP 78%,  2%, AAIR-DDDR 60-130bpm  Lead(s): Stable  Battery longevity: Estimating 10.8 years remaining  Presenting: APVS 80bpm  Atrial high rates: Since 2/11/24, 72 mode switches logged, EGM's suggest AF, burden 12.6%, v-rate >/=120bpm ~ 15%.  Anticoagulant: Eliquis  Ventricular High rates: None  Comments: Normal device function.   Plan: Remote device check scheduled for 7/10/24. Minda Nowak RN     Physical Examination  Review of Systems   Vitals: /70 (BP Location: Right arm, Patient Position: Sitting, Cuff Size: Adult Regular)   Pulse 68   Resp 20   Wt 70.8 kg (156 lb)   BMI 24.07 kg/m    BMI= Body mass index is 24.07 kg/m .  Wt Readings from Last 3 Encounters:   04/29/24 70.8 kg (156 lb)    04/01/24 73.5 kg (162 lb)   02/08/23 71.4 kg (157 lb 8 oz)           ENT/Mouth: membranes moist, no oral lesions or bleeding gums.      EYES:  no scleral icterus, normal conjunctivae       Chest/Lungs:   lungs are clear to auscultation, no rales or wheezing,  equal chest wall expansion    Cardiovascular:   Irregularly irregular normal first and second heart sounds with no murmurs, rubs, or gallops; the carotid, radial and posterior tibial pulses are intact,  no edema bilaterally        Extremities: no cyanosis or clubbing   Skin: no xanthelasma, warm.    Neurologic: no tremors     Psychiatric: alert and oriented x3, calm        Please refer above for cardiac ROS details.        Medical History  Surgical History Family History Social History   Past Medical History:   Diagnosis Date    Arthritis     bilateral hands    Atrial fibrillation (H)     Atrial flutter (H)     Cancer (H)     skin cancer    Gout     Hypertension      Past Surgical History:   Procedure Laterality Date    BIOPSY SKIN (LOCATION)      to remove skin cancer    CIRCUMCISION      EP PACEMAKER INSERT N/A 2/8/2021    Procedure: EP Pacemaker Insertion;  Surgeon: Bautista Umana MD;  Location: [facility];  Service: Cardiology    HERNIA REPAIR Right 1988    inquinal hernia    TONSILLECTOMY       No family history on file.     Social History     Socioeconomic History    Marital status:      Spouse name: Not on file    Number of children: Not on file    Years of education: Not on file    Highest education level: Not on file   Occupational History    Not on file   Tobacco Use    Smoking status: Never    Smokeless tobacco: Never   Substance and Sexual Activity    Alcohol use: No     Comment: Alcoholic Drinks/day: very little    Drug use: No    Sexual activity: Not on file   Other Topics Concern    Not on file   Social History Narrative    Not on file     Social Determinants of Health     Financial Resource Strain: High Risk (1/1/2022)     [facility] [email or web address] [facility], [facility] [employer] [facility] [employer] [facility]    Financial Resource Strain     Difficulty of Paying Living Expenses: Not on file     Difficulty of Paying Living Expenses: Not on file   Food Insecurity: Not on file   Transportation Needs: Not on file   Physical Activity: Not on file   Stress: Not on file   Social Connections: Unknown (1/1/2022)    Received from [employer] [facility] [employer] [facility], [facility] [employer] [facility]    Social Connections     Frequency of Communication with Friends and Family: Not on file   Interpersonal Safety: Not on file   Housing Stability: Not on file           Medications  Allergies   Current Outpatient Medications   Medication Sig Dispense Refill    amiodarone (PACERONE) 200 MG tablet TAKE 1 TAB ONCE DAILY 90 tablet 3    apixaban ANTICOAGULANT (ELIQUIS ANTICOAGULANT) 5 MG tablet Take 1 tablet (5 mg) by mouth 2 times daily 180 tablet 2    arginine HCl, L-arginine, 500 mg cap [ARGININE HCL, L-ARGININE, 500 MG CAP] Take 500 mg by mouth 2 (two) times a day.       diltiazem ER (DILT-XR) 180 MG 24 hr capsule Take 2 capsules by mouth once daily 180 capsule 3    diltiazem ER (DILT-XR) 240 MG 24 hr ER beaded capsule TAKE 240 MG CAPSULE BY MOUTH WITH  MG FOR A TOTAL DAILY DOSE  MG ONCE DAILY. 90 capsule 3    DOCOSAHEXANOIC ACID/EPA (FISH OIL ORAL) Take 500 mg by mouth 3 times daily      furosemide (LASIX) 40 MG tablet [FUROSEMIDE (LASIX) 40 MG TABLET] Take 1 tablet (40 mg total) by mouth daily. 30 tablet 0    grape seed extract (GRAPE SEED ORAL) [GRAPE SEED EXTRACT (GRAPE SEED ORAL)] Take 1 tablet by mouth daily.      lactase (LACTAID) 3,000 unit tablet [LACTASE (LACTAID) 3,000 UNIT TABLET] Take 1 tablet (3,000 Units total) by mouth 3 (three) times a day with meals.  0    levOCARNitine (L-CARNITINE) 500 mg Tab [LEVOCARNITINE (L-CARNITINE) 500 MG TAB] Take 500 mg by mouth daily.      metoprolol  "tartrate (LOPRESSOR) 50 MG tablet Take 1.5 tablets (75 mg) by mouth 2 times daily 270 tablet 3    multivitamin with minerals (THERA-M) 9 mg iron-400 mcg Tab tablet [MULTIVITAMIN WITH MINERALS (THERA-M) 9 MG IRON-400 MCG TAB TABLET] Take 1 tablet by mouth daily.      UNABLE TO FIND Take 3 tablets by mouth daily         Allergies   Allergen Reactions    Simvastatin      Other reaction(s): muscle aches          Lab Results    Chemistry/lipid CBC Cardiac Enzymes/BNP/TSH/INR   Recent Labs   Lab Test 04/18/22  0926   CHOL 225*   HDL 53   *   TRIG 169*     Recent Labs   Lab Test 04/18/22  0926 04/11/19  1010 03/29/18  1027   * 135* 102     Recent Labs   Lab Test 04/01/24  1503      POTASSIUM 4.4   CHLORIDE 102   CO2 32*   *   BUN 16.0   CR 1.06   GFRESTIMATED 67   DARCY 9.8     Recent Labs   Lab Test 04/01/24  1503 04/18/22  0926 02/18/21  1108   CR 1.06 0.87 0.88     No results for input(s): \"A1C\" in the last 76288 hours.       Recent Labs   Lab Test 02/09/21  0444 02/07/21  0429   WBC  --  8.8   HGB  --  13.4*   HCT  --  39.9*   MCV  --  94    155     Recent Labs   Lab Test 02/07/21  0429 02/05/21  2241 02/05/21  0339   HGB 13.4* 13.2* 13.1*    Recent Labs   Lab Test 02/05/21  2241 02/05/21  0339 02/03/21  1303   TROPONINI 0.03 0.04 0.04     Recent Labs   Lab Test 02/03/21  1303   *     Recent Labs   Lab Test 04/01/24  1503   TSH 1.51     Recent Labs   Lab Test 02/04/21  0530   INR 1.33*          Alla Garcia PA-C          Thank you for allowing me to participate in the care of your patient.      Sincerely,     Alla Garcia PA-C      Heart Care  cc:   Gracia Ramirez, NP  [id number]  [place] [id number]      "

## 2024-07-10 ENCOUNTER — ANCILLARY PROCEDURE (OUTPATIENT)
Dept: CARDIOLOGY | Facility: CLINIC | Age: 89
End: 2024-07-10
Attending: INTERNAL MEDICINE
Payer: COMMERCIAL

## 2024-07-10 DIAGNOSIS — I49.5 SICK SINUS SYNDROME (H): ICD-10-CM

## 2024-07-10 DIAGNOSIS — I48.0 PAROXYSMAL ATRIAL FIBRILLATION (H): ICD-10-CM

## 2024-07-10 DIAGNOSIS — Z95.0 PACEMAKER: ICD-10-CM

## 2024-07-10 PROCEDURE — 93296 REM INTERROG EVL PM/IDS: CPT | Mod: 4MD | Performed by: INTERNAL MEDICINE

## 2024-07-10 PROCEDURE — 93294 REM INTERROG EVL PM/LDLS PM: CPT | Mod: 4MD | Performed by: INTERNAL MEDICINE

## 2024-07-11 LAB
MDC_IDC_EPISODE_DTM: NORMAL
MDC_IDC_EPISODE_DURATION: 1019 S
MDC_IDC_EPISODE_DURATION: 109 S
MDC_IDC_EPISODE_DURATION: 117 S
MDC_IDC_EPISODE_DURATION: 1170 S
MDC_IDC_EPISODE_DURATION: 1324 S
MDC_IDC_EPISODE_DURATION: 1365 S
MDC_IDC_EPISODE_DURATION: 186 S
MDC_IDC_EPISODE_DURATION: 1889 S
MDC_IDC_EPISODE_DURATION: 1932 S
MDC_IDC_EPISODE_DURATION: 2070 S
MDC_IDC_EPISODE_DURATION: 214 S
MDC_IDC_EPISODE_DURATION: 2336 S
MDC_IDC_EPISODE_DURATION: 2408 S
MDC_IDC_EPISODE_DURATION: 2706 S
MDC_IDC_EPISODE_DURATION: 2750 S
MDC_IDC_EPISODE_DURATION: 2930 S
MDC_IDC_EPISODE_DURATION: 2974 S
MDC_IDC_EPISODE_DURATION: 3386 S
MDC_IDC_EPISODE_DURATION: 3823 S
MDC_IDC_EPISODE_DURATION: 384 S
MDC_IDC_EPISODE_DURATION: 3994 S
MDC_IDC_EPISODE_DURATION: 42 S
MDC_IDC_EPISODE_DURATION: 4348 S
MDC_IDC_EPISODE_DURATION: 438 S
MDC_IDC_EPISODE_DURATION: 4600 S
MDC_IDC_EPISODE_DURATION: 48 S
MDC_IDC_EPISODE_DURATION: 4849 S
MDC_IDC_EPISODE_DURATION: 4900 S
MDC_IDC_EPISODE_DURATION: 5063 S
MDC_IDC_EPISODE_DURATION: 54 S
MDC_IDC_EPISODE_DURATION: 5425 S
MDC_IDC_EPISODE_DURATION: 5425 S
MDC_IDC_EPISODE_DURATION: 55 S
MDC_IDC_EPISODE_DURATION: 695 S
MDC_IDC_EPISODE_DURATION: 6959 S
MDC_IDC_EPISODE_DURATION: 7355 S
MDC_IDC_EPISODE_DURATION: 74 S
MDC_IDC_EPISODE_DURATION: 7708 S
MDC_IDC_EPISODE_DURATION: 860 S
MDC_IDC_EPISODE_DURATION: 871 S
MDC_IDC_EPISODE_DURATION: 8828 S
MDC_IDC_EPISODE_DURATION: 913 S
MDC_IDC_EPISODE_DURATION: 99 S
MDC_IDC_EPISODE_DURATION: NORMAL S
MDC_IDC_EPISODE_ID: 5706
MDC_IDC_EPISODE_ID: 5781
MDC_IDC_EPISODE_ID: 5782
MDC_IDC_EPISODE_ID: 5783
MDC_IDC_EPISODE_ID: 5784
MDC_IDC_EPISODE_ID: 5785
MDC_IDC_EPISODE_ID: 5786
MDC_IDC_EPISODE_ID: 5787
MDC_IDC_EPISODE_ID: 5788
MDC_IDC_EPISODE_ID: 5789
MDC_IDC_EPISODE_ID: 5790
MDC_IDC_EPISODE_ID: 5791
MDC_IDC_EPISODE_ID: 5792
MDC_IDC_EPISODE_ID: 5793
MDC_IDC_EPISODE_ID: 5794
MDC_IDC_EPISODE_ID: 5795
MDC_IDC_EPISODE_ID: 5796
MDC_IDC_EPISODE_ID: 5797
MDC_IDC_EPISODE_ID: 5798
MDC_IDC_EPISODE_ID: 5799
MDC_IDC_EPISODE_ID: 5800
MDC_IDC_EPISODE_ID: 5801
MDC_IDC_EPISODE_ID: 5802
MDC_IDC_EPISODE_ID: 5803
MDC_IDC_EPISODE_ID: 5804
MDC_IDC_EPISODE_ID: 5805
MDC_IDC_EPISODE_ID: 5806
MDC_IDC_EPISODE_ID: 5807
MDC_IDC_EPISODE_ID: 5808
MDC_IDC_EPISODE_ID: 5809
MDC_IDC_EPISODE_ID: 5810
MDC_IDC_EPISODE_ID: 5811
MDC_IDC_EPISODE_ID: 5812
MDC_IDC_EPISODE_ID: 5813
MDC_IDC_EPISODE_ID: 5814
MDC_IDC_EPISODE_ID: 5815
MDC_IDC_EPISODE_ID: 5816
MDC_IDC_EPISODE_ID: 5817
MDC_IDC_EPISODE_ID: 5818
MDC_IDC_EPISODE_ID: 5819
MDC_IDC_EPISODE_ID: 5820
MDC_IDC_EPISODE_ID: 5821
MDC_IDC_EPISODE_ID: 5822
MDC_IDC_EPISODE_ID: 5823
MDC_IDC_EPISODE_ID: 5824
MDC_IDC_EPISODE_ID: 5825
MDC_IDC_EPISODE_ID: 5826
MDC_IDC_EPISODE_ID: 5827
MDC_IDC_EPISODE_ID: 5828
MDC_IDC_EPISODE_ID: 5829
MDC_IDC_EPISODE_ID: 5830
MDC_IDC_EPISODE_TYPE: NORMAL
MDC_IDC_LEAD_CONNECTION_STATUS: NORMAL
MDC_IDC_LEAD_CONNECTION_STATUS: NORMAL
MDC_IDC_LEAD_IMPLANT_DT: NORMAL
MDC_IDC_LEAD_IMPLANT_DT: NORMAL
MDC_IDC_LEAD_LOCATION: NORMAL
MDC_IDC_LEAD_LOCATION: NORMAL
MDC_IDC_LEAD_LOCATION_DETAIL_1: NORMAL
MDC_IDC_LEAD_LOCATION_DETAIL_1: NORMAL
MDC_IDC_LEAD_MFG: NORMAL
MDC_IDC_LEAD_MFG: NORMAL
MDC_IDC_LEAD_MODEL: NORMAL
MDC_IDC_LEAD_MODEL: NORMAL
MDC_IDC_LEAD_POLARITY_TYPE: NORMAL
MDC_IDC_LEAD_POLARITY_TYPE: NORMAL
MDC_IDC_LEAD_SERIAL: NORMAL
MDC_IDC_LEAD_SERIAL: NORMAL
MDC_IDC_LEAD_SPECIAL_FUNCTION: 69
MDC_IDC_MSMT_BATTERY_DTM: NORMAL
MDC_IDC_MSMT_BATTERY_REMAINING_LONGEVITY: 125 MO
MDC_IDC_MSMT_BATTERY_RRT_TRIGGER: 2.62
MDC_IDC_MSMT_BATTERY_STATUS: NORMAL
MDC_IDC_MSMT_BATTERY_VOLTAGE: 3.01 V
MDC_IDC_MSMT_LEADCHNL_RA_IMPEDANCE_VALUE: 285 OHM
MDC_IDC_MSMT_LEADCHNL_RA_IMPEDANCE_VALUE: 380 OHM
MDC_IDC_MSMT_LEADCHNL_RA_PACING_THRESHOLD_AMPLITUDE: 0.62 V
MDC_IDC_MSMT_LEADCHNL_RA_PACING_THRESHOLD_PULSEWIDTH: 0.4 MS
MDC_IDC_MSMT_LEADCHNL_RA_SENSING_INTR_AMPL: 1.12 MV
MDC_IDC_MSMT_LEADCHNL_RA_SENSING_INTR_AMPL: 1.12 MV
MDC_IDC_MSMT_LEADCHNL_RV_IMPEDANCE_VALUE: 380 OHM
MDC_IDC_MSMT_LEADCHNL_RV_IMPEDANCE_VALUE: 551 OHM
MDC_IDC_MSMT_LEADCHNL_RV_PACING_THRESHOLD_AMPLITUDE: 1 V
MDC_IDC_MSMT_LEADCHNL_RV_PACING_THRESHOLD_PULSEWIDTH: 0.4 MS
MDC_IDC_MSMT_LEADCHNL_RV_SENSING_INTR_AMPL: 16.88 MV
MDC_IDC_MSMT_LEADCHNL_RV_SENSING_INTR_AMPL: 16.88 MV
MDC_IDC_PG_IMPLANT_DTM: NORMAL
MDC_IDC_PG_MFG: NORMAL
MDC_IDC_PG_MODEL: NORMAL
MDC_IDC_PG_SERIAL: NORMAL
MDC_IDC_PG_TYPE: NORMAL
MDC_IDC_SESS_CLINIC_NAME: NORMAL
MDC_IDC_SESS_DTM: NORMAL
MDC_IDC_SESS_TYPE: NORMAL
MDC_IDC_SET_BRADY_AT_MODE_SWITCH_RATE: 171 {BEATS}/MIN
MDC_IDC_SET_BRADY_HYSTRATE: NORMAL
MDC_IDC_SET_BRADY_LOWRATE: 60 {BEATS}/MIN
MDC_IDC_SET_BRADY_MAX_SENSOR_RATE: 130 {BEATS}/MIN
MDC_IDC_SET_BRADY_MAX_TRACKING_RATE: 130 {BEATS}/MIN
MDC_IDC_SET_BRADY_MODE: NORMAL
MDC_IDC_SET_BRADY_PAV_DELAY_LOW: 180 MS
MDC_IDC_SET_BRADY_SAV_DELAY_LOW: 150 MS
MDC_IDC_SET_LEADCHNL_RA_PACING_AMPLITUDE: 1.5 V
MDC_IDC_SET_LEADCHNL_RA_PACING_ANODE_ELECTRODE_1: NORMAL
MDC_IDC_SET_LEADCHNL_RA_PACING_ANODE_LOCATION_1: NORMAL
MDC_IDC_SET_LEADCHNL_RA_PACING_CAPTURE_MODE: NORMAL
MDC_IDC_SET_LEADCHNL_RA_PACING_CATHODE_ELECTRODE_1: NORMAL
MDC_IDC_SET_LEADCHNL_RA_PACING_CATHODE_LOCATION_1: NORMAL
MDC_IDC_SET_LEADCHNL_RA_PACING_POLARITY: NORMAL
MDC_IDC_SET_LEADCHNL_RA_PACING_PULSEWIDTH: 0.4 MS
MDC_IDC_SET_LEADCHNL_RA_SENSING_ANODE_ELECTRODE_1: NORMAL
MDC_IDC_SET_LEADCHNL_RA_SENSING_ANODE_LOCATION_1: NORMAL
MDC_IDC_SET_LEADCHNL_RA_SENSING_CATHODE_ELECTRODE_1: NORMAL
MDC_IDC_SET_LEADCHNL_RA_SENSING_CATHODE_LOCATION_1: NORMAL
MDC_IDC_SET_LEADCHNL_RA_SENSING_POLARITY: NORMAL
MDC_IDC_SET_LEADCHNL_RA_SENSING_SENSITIVITY: 0.15 MV
MDC_IDC_SET_LEADCHNL_RV_PACING_AMPLITUDE: 1.5 V
MDC_IDC_SET_LEADCHNL_RV_PACING_ANODE_ELECTRODE_1: NORMAL
MDC_IDC_SET_LEADCHNL_RV_PACING_ANODE_LOCATION_1: NORMAL
MDC_IDC_SET_LEADCHNL_RV_PACING_CAPTURE_MODE: NORMAL
MDC_IDC_SET_LEADCHNL_RV_PACING_CATHODE_ELECTRODE_1: NORMAL
MDC_IDC_SET_LEADCHNL_RV_PACING_CATHODE_LOCATION_1: NORMAL
MDC_IDC_SET_LEADCHNL_RV_PACING_POLARITY: NORMAL
MDC_IDC_SET_LEADCHNL_RV_PACING_PULSEWIDTH: 0.4 MS
MDC_IDC_SET_LEADCHNL_RV_SENSING_ANODE_ELECTRODE_1: NORMAL
MDC_IDC_SET_LEADCHNL_RV_SENSING_ANODE_LOCATION_1: NORMAL
MDC_IDC_SET_LEADCHNL_RV_SENSING_CATHODE_ELECTRODE_1: NORMAL
MDC_IDC_SET_LEADCHNL_RV_SENSING_CATHODE_LOCATION_1: NORMAL
MDC_IDC_SET_LEADCHNL_RV_SENSING_POLARITY: NORMAL
MDC_IDC_SET_LEADCHNL_RV_SENSING_SENSITIVITY: 0.9 MV
MDC_IDC_SET_ZONE_DETECTION_INTERVAL: 200 MS
MDC_IDC_SET_ZONE_DETECTION_INTERVAL: 350 MS
MDC_IDC_SET_ZONE_DETECTION_INTERVAL: 400 MS
MDC_IDC_SET_ZONE_STATUS: NORMAL
MDC_IDC_SET_ZONE_TYPE: NORMAL
MDC_IDC_SET_ZONE_VENDOR_TYPE: NORMAL
MDC_IDC_STAT_AT_BURDEN_PERCENT: 8.2 %
MDC_IDC_STAT_AT_DTM_END: NORMAL
MDC_IDC_STAT_AT_DTM_START: NORMAL
MDC_IDC_STAT_BRADY_AP_VP_PERCENT: 0.05 %
MDC_IDC_STAT_BRADY_AP_VS_PERCENT: 94.04 %
MDC_IDC_STAT_BRADY_AS_VP_PERCENT: 0.01 %
MDC_IDC_STAT_BRADY_AS_VS_PERCENT: 5.93 %
MDC_IDC_STAT_BRADY_DTM_END: NORMAL
MDC_IDC_STAT_BRADY_DTM_START: NORMAL
MDC_IDC_STAT_BRADY_RA_PERCENT_PACED: 86.43 %
MDC_IDC_STAT_BRADY_RV_PERCENT_PACED: 0.74 %
MDC_IDC_STAT_EPISODE_RECENT_COUNT: 0
MDC_IDC_STAT_EPISODE_RECENT_COUNT: 1
MDC_IDC_STAT_EPISODE_RECENT_COUNT: 137
MDC_IDC_STAT_EPISODE_RECENT_COUNT_DTM_END: NORMAL
MDC_IDC_STAT_EPISODE_RECENT_COUNT_DTM_START: NORMAL
MDC_IDC_STAT_EPISODE_TOTAL_COUNT: 0
MDC_IDC_STAT_EPISODE_TOTAL_COUNT: 1
MDC_IDC_STAT_EPISODE_TOTAL_COUNT: 249
MDC_IDC_STAT_EPISODE_TOTAL_COUNT: 4887
MDC_IDC_STAT_EPISODE_TOTAL_COUNT: 638
MDC_IDC_STAT_EPISODE_TOTAL_COUNT_DTM_END: NORMAL
MDC_IDC_STAT_EPISODE_TOTAL_COUNT_DTM_START: NORMAL
MDC_IDC_STAT_EPISODE_TYPE: NORMAL
MDC_IDC_STAT_TACHYTHERAPY_RECENT_DTM_END: NORMAL
MDC_IDC_STAT_TACHYTHERAPY_RECENT_DTM_START: NORMAL
MDC_IDC_STAT_TACHYTHERAPY_TOTAL_DTM_END: NORMAL
MDC_IDC_STAT_TACHYTHERAPY_TOTAL_DTM_START: NORMAL

## 2024-09-29 ENCOUNTER — HEALTH MAINTENANCE LETTER (OUTPATIENT)
Age: 89
End: 2024-09-29

## 2024-09-29 DIAGNOSIS — I48.91 ATRIAL FIBRILLATION WITH RVR (H): ICD-10-CM

## 2024-09-30 RX ORDER — APIXABAN 5 MG/1
5 TABLET, FILM COATED ORAL 2 TIMES DAILY
Qty: 180 TABLET | Refills: 3 | Status: SHIPPED | OUTPATIENT
Start: 2024-09-30

## 2024-10-23 ENCOUNTER — ANCILLARY PROCEDURE (OUTPATIENT)
Dept: CARDIOLOGY | Facility: CLINIC | Age: 89
End: 2024-10-23
Attending: INTERNAL MEDICINE
Payer: COMMERCIAL

## 2024-10-23 DIAGNOSIS — I48.0 PAROXYSMAL ATRIAL FIBRILLATION (H): ICD-10-CM

## 2024-10-23 DIAGNOSIS — Z95.0 PACEMAKER: ICD-10-CM

## 2024-10-23 DIAGNOSIS — I49.5 SICK SINUS SYNDROME (H): ICD-10-CM

## 2024-10-24 LAB
MDC_IDC_EPISODE_DTM: NORMAL
MDC_IDC_EPISODE_DURATION: 101 S
MDC_IDC_EPISODE_DURATION: 1126 S
MDC_IDC_EPISODE_DURATION: 1167 S
MDC_IDC_EPISODE_DURATION: 1305 S
MDC_IDC_EPISODE_DURATION: 1328 S
MDC_IDC_EPISODE_DURATION: 1472 S
MDC_IDC_EPISODE_DURATION: 1824 S
MDC_IDC_EPISODE_DURATION: 186 S
MDC_IDC_EPISODE_DURATION: 1899 S
MDC_IDC_EPISODE_DURATION: 2102 S
MDC_IDC_EPISODE_DURATION: 2323 S
MDC_IDC_EPISODE_DURATION: 2385 S
MDC_IDC_EPISODE_DURATION: 258 S
MDC_IDC_EPISODE_DURATION: 2693 S
MDC_IDC_EPISODE_DURATION: 2932 S
MDC_IDC_EPISODE_DURATION: 3066 S
MDC_IDC_EPISODE_DURATION: 3095 S
MDC_IDC_EPISODE_DURATION: 3109 S
MDC_IDC_EPISODE_DURATION: 3384 S
MDC_IDC_EPISODE_DURATION: 3588 S
MDC_IDC_EPISODE_DURATION: 367 S
MDC_IDC_EPISODE_DURATION: 3829 S
MDC_IDC_EPISODE_DURATION: 3946 S
MDC_IDC_EPISODE_DURATION: 4124 S
MDC_IDC_EPISODE_DURATION: 4245 S
MDC_IDC_EPISODE_DURATION: 454 S
MDC_IDC_EPISODE_DURATION: 469 S
MDC_IDC_EPISODE_DURATION: 4803 S
MDC_IDC_EPISODE_DURATION: 5178 S
MDC_IDC_EPISODE_DURATION: 6092 S
MDC_IDC_EPISODE_DURATION: 6382 S
MDC_IDC_EPISODE_DURATION: 6592 S
MDC_IDC_EPISODE_DURATION: 69 S
MDC_IDC_EPISODE_DURATION: 7598 S
MDC_IDC_EPISODE_DURATION: 7640 S
MDC_IDC_EPISODE_DURATION: 803 S
MDC_IDC_EPISODE_DURATION: 8126 S
MDC_IDC_EPISODE_DURATION: 814 S
MDC_IDC_EPISODE_DURATION: 8482 S
MDC_IDC_EPISODE_DURATION: 8608 S
MDC_IDC_EPISODE_DURATION: 867 S
MDC_IDC_EPISODE_DURATION: 923 S
MDC_IDC_EPISODE_DURATION: NORMAL S
MDC_IDC_EPISODE_ID: 5894
MDC_IDC_EPISODE_ID: 5895
MDC_IDC_EPISODE_ID: 5896
MDC_IDC_EPISODE_ID: 5897
MDC_IDC_EPISODE_ID: 5898
MDC_IDC_EPISODE_ID: 5899
MDC_IDC_EPISODE_ID: 5900
MDC_IDC_EPISODE_ID: 5901
MDC_IDC_EPISODE_ID: 5902
MDC_IDC_EPISODE_ID: 5903
MDC_IDC_EPISODE_ID: 5904
MDC_IDC_EPISODE_ID: 5905
MDC_IDC_EPISODE_ID: 5906
MDC_IDC_EPISODE_ID: 5907
MDC_IDC_EPISODE_ID: 5908
MDC_IDC_EPISODE_ID: 5909
MDC_IDC_EPISODE_ID: 5910
MDC_IDC_EPISODE_ID: 5911
MDC_IDC_EPISODE_ID: 5912
MDC_IDC_EPISODE_ID: 5913
MDC_IDC_EPISODE_ID: 5914
MDC_IDC_EPISODE_ID: 5915
MDC_IDC_EPISODE_ID: 5916
MDC_IDC_EPISODE_ID: 5917
MDC_IDC_EPISODE_ID: 5918
MDC_IDC_EPISODE_ID: 5919
MDC_IDC_EPISODE_ID: 5920
MDC_IDC_EPISODE_ID: 5921
MDC_IDC_EPISODE_ID: 5922
MDC_IDC_EPISODE_ID: 5923
MDC_IDC_EPISODE_ID: 5924
MDC_IDC_EPISODE_ID: 5925
MDC_IDC_EPISODE_ID: 5926
MDC_IDC_EPISODE_ID: 5927
MDC_IDC_EPISODE_ID: 5928
MDC_IDC_EPISODE_ID: 5929
MDC_IDC_EPISODE_ID: 5930
MDC_IDC_EPISODE_ID: 5931
MDC_IDC_EPISODE_ID: 5932
MDC_IDC_EPISODE_ID: 5933
MDC_IDC_EPISODE_ID: 5934
MDC_IDC_EPISODE_ID: 5935
MDC_IDC_EPISODE_ID: 5936
MDC_IDC_EPISODE_ID: 5937
MDC_IDC_EPISODE_ID: 5938
MDC_IDC_EPISODE_ID: 5939
MDC_IDC_EPISODE_ID: 5940
MDC_IDC_EPISODE_ID: 5941
MDC_IDC_EPISODE_ID: 5942
MDC_IDC_EPISODE_ID: 5943
MDC_IDC_EPISODE_TYPE: NORMAL
MDC_IDC_LEAD_CONNECTION_STATUS: NORMAL
MDC_IDC_LEAD_CONNECTION_STATUS: NORMAL
MDC_IDC_LEAD_IMPLANT_DT: NORMAL
MDC_IDC_LEAD_IMPLANT_DT: NORMAL
MDC_IDC_LEAD_LOCATION: NORMAL
MDC_IDC_LEAD_LOCATION: NORMAL
MDC_IDC_LEAD_LOCATION_DETAIL_1: NORMAL
MDC_IDC_LEAD_LOCATION_DETAIL_1: NORMAL
MDC_IDC_LEAD_MFG: NORMAL
MDC_IDC_LEAD_MFG: NORMAL
MDC_IDC_LEAD_MODEL: NORMAL
MDC_IDC_LEAD_MODEL: NORMAL
MDC_IDC_LEAD_POLARITY_TYPE: NORMAL
MDC_IDC_LEAD_POLARITY_TYPE: NORMAL
MDC_IDC_LEAD_SERIAL: NORMAL
MDC_IDC_LEAD_SERIAL: NORMAL
MDC_IDC_LEAD_SPECIAL_FUNCTION: 69
MDC_IDC_MSMT_BATTERY_DTM: NORMAL
MDC_IDC_MSMT_BATTERY_REMAINING_LONGEVITY: 121 MO
MDC_IDC_MSMT_BATTERY_RRT_TRIGGER: 2.62
MDC_IDC_MSMT_BATTERY_STATUS: NORMAL
MDC_IDC_MSMT_BATTERY_VOLTAGE: 3.01 V
MDC_IDC_MSMT_LEADCHNL_RA_IMPEDANCE_VALUE: 304 OHM
MDC_IDC_MSMT_LEADCHNL_RA_IMPEDANCE_VALUE: 380 OHM
MDC_IDC_MSMT_LEADCHNL_RA_PACING_THRESHOLD_AMPLITUDE: 0.62 V
MDC_IDC_MSMT_LEADCHNL_RA_PACING_THRESHOLD_PULSEWIDTH: 0.4 MS
MDC_IDC_MSMT_LEADCHNL_RA_SENSING_INTR_AMPL: 1.38 MV
MDC_IDC_MSMT_LEADCHNL_RA_SENSING_INTR_AMPL: 1.38 MV
MDC_IDC_MSMT_LEADCHNL_RV_IMPEDANCE_VALUE: 399 OHM
MDC_IDC_MSMT_LEADCHNL_RV_IMPEDANCE_VALUE: 570 OHM
MDC_IDC_MSMT_LEADCHNL_RV_PACING_THRESHOLD_AMPLITUDE: 1 V
MDC_IDC_MSMT_LEADCHNL_RV_PACING_THRESHOLD_PULSEWIDTH: 0.4 MS
MDC_IDC_MSMT_LEADCHNL_RV_SENSING_INTR_AMPL: 17.38 MV
MDC_IDC_MSMT_LEADCHNL_RV_SENSING_INTR_AMPL: 17.38 MV
MDC_IDC_PG_IMPLANT_DTM: NORMAL
MDC_IDC_PG_MFG: NORMAL
MDC_IDC_PG_MODEL: NORMAL
MDC_IDC_PG_SERIAL: NORMAL
MDC_IDC_PG_TYPE: NORMAL
MDC_IDC_SESS_CLINIC_NAME: NORMAL
MDC_IDC_SESS_DTM: NORMAL
MDC_IDC_SESS_TYPE: NORMAL
MDC_IDC_SET_BRADY_AT_MODE_SWITCH_RATE: 171 {BEATS}/MIN
MDC_IDC_SET_BRADY_HYSTRATE: NORMAL
MDC_IDC_SET_BRADY_LOWRATE: 60 {BEATS}/MIN
MDC_IDC_SET_BRADY_MAX_SENSOR_RATE: 130 {BEATS}/MIN
MDC_IDC_SET_BRADY_MAX_TRACKING_RATE: 130 {BEATS}/MIN
MDC_IDC_SET_BRADY_MODE: NORMAL
MDC_IDC_SET_BRADY_PAV_DELAY_LOW: 180 MS
MDC_IDC_SET_BRADY_SAV_DELAY_LOW: 150 MS
MDC_IDC_SET_LEADCHNL_RA_PACING_AMPLITUDE: 1.5 V
MDC_IDC_SET_LEADCHNL_RA_PACING_ANODE_ELECTRODE_1: NORMAL
MDC_IDC_SET_LEADCHNL_RA_PACING_ANODE_LOCATION_1: NORMAL
MDC_IDC_SET_LEADCHNL_RA_PACING_CAPTURE_MODE: NORMAL
MDC_IDC_SET_LEADCHNL_RA_PACING_CATHODE_ELECTRODE_1: NORMAL
MDC_IDC_SET_LEADCHNL_RA_PACING_CATHODE_LOCATION_1: NORMAL
MDC_IDC_SET_LEADCHNL_RA_PACING_POLARITY: NORMAL
MDC_IDC_SET_LEADCHNL_RA_PACING_PULSEWIDTH: 0.4 MS
MDC_IDC_SET_LEADCHNL_RA_SENSING_ANODE_ELECTRODE_1: NORMAL
MDC_IDC_SET_LEADCHNL_RA_SENSING_ANODE_LOCATION_1: NORMAL
MDC_IDC_SET_LEADCHNL_RA_SENSING_CATHODE_ELECTRODE_1: NORMAL
MDC_IDC_SET_LEADCHNL_RA_SENSING_CATHODE_LOCATION_1: NORMAL
MDC_IDC_SET_LEADCHNL_RA_SENSING_POLARITY: NORMAL
MDC_IDC_SET_LEADCHNL_RA_SENSING_SENSITIVITY: 0.15 MV
MDC_IDC_SET_LEADCHNL_RV_PACING_AMPLITUDE: 1.5 V
MDC_IDC_SET_LEADCHNL_RV_PACING_ANODE_ELECTRODE_1: NORMAL
MDC_IDC_SET_LEADCHNL_RV_PACING_ANODE_LOCATION_1: NORMAL
MDC_IDC_SET_LEADCHNL_RV_PACING_CAPTURE_MODE: NORMAL
MDC_IDC_SET_LEADCHNL_RV_PACING_CATHODE_ELECTRODE_1: NORMAL
MDC_IDC_SET_LEADCHNL_RV_PACING_CATHODE_LOCATION_1: NORMAL
MDC_IDC_SET_LEADCHNL_RV_PACING_POLARITY: NORMAL
MDC_IDC_SET_LEADCHNL_RV_PACING_PULSEWIDTH: 0.4 MS
MDC_IDC_SET_LEADCHNL_RV_SENSING_ANODE_ELECTRODE_1: NORMAL
MDC_IDC_SET_LEADCHNL_RV_SENSING_ANODE_LOCATION_1: NORMAL
MDC_IDC_SET_LEADCHNL_RV_SENSING_CATHODE_ELECTRODE_1: NORMAL
MDC_IDC_SET_LEADCHNL_RV_SENSING_CATHODE_LOCATION_1: NORMAL
MDC_IDC_SET_LEADCHNL_RV_SENSING_POLARITY: NORMAL
MDC_IDC_SET_LEADCHNL_RV_SENSING_SENSITIVITY: 0.9 MV
MDC_IDC_SET_ZONE_DETECTION_INTERVAL: 200 MS
MDC_IDC_SET_ZONE_DETECTION_INTERVAL: 350 MS
MDC_IDC_SET_ZONE_DETECTION_INTERVAL: 400 MS
MDC_IDC_SET_ZONE_STATUS: NORMAL
MDC_IDC_SET_ZONE_TYPE: NORMAL
MDC_IDC_SET_ZONE_VENDOR_TYPE: NORMAL
MDC_IDC_STAT_AT_BURDEN_PERCENT: 6.8 %
MDC_IDC_STAT_AT_DTM_END: NORMAL
MDC_IDC_STAT_AT_DTM_START: NORMAL
MDC_IDC_STAT_BRADY_AP_VP_PERCENT: 0.07 %
MDC_IDC_STAT_BRADY_AP_VS_PERCENT: 99.45 %
MDC_IDC_STAT_BRADY_AS_VP_PERCENT: 0 %
MDC_IDC_STAT_BRADY_AS_VS_PERCENT: 0.5 %
MDC_IDC_STAT_BRADY_DTM_END: NORMAL
MDC_IDC_STAT_BRADY_DTM_START: NORMAL
MDC_IDC_STAT_BRADY_RA_PERCENT_PACED: 92.76 %
MDC_IDC_STAT_BRADY_RV_PERCENT_PACED: 0.71 %
MDC_IDC_STAT_EPISODE_RECENT_COUNT: 0
MDC_IDC_STAT_EPISODE_RECENT_COUNT: 113
MDC_IDC_STAT_EPISODE_RECENT_COUNT_DTM_END: NORMAL
MDC_IDC_STAT_EPISODE_RECENT_COUNT_DTM_START: NORMAL
MDC_IDC_STAT_EPISODE_TOTAL_COUNT: 0
MDC_IDC_STAT_EPISODE_TOTAL_COUNT: 1
MDC_IDC_STAT_EPISODE_TOTAL_COUNT: 249
MDC_IDC_STAT_EPISODE_TOTAL_COUNT: 5000
MDC_IDC_STAT_EPISODE_TOTAL_COUNT: 638
MDC_IDC_STAT_EPISODE_TOTAL_COUNT_DTM_END: NORMAL
MDC_IDC_STAT_EPISODE_TOTAL_COUNT_DTM_START: NORMAL
MDC_IDC_STAT_EPISODE_TYPE: NORMAL
MDC_IDC_STAT_TACHYTHERAPY_RECENT_DTM_END: NORMAL
MDC_IDC_STAT_TACHYTHERAPY_RECENT_DTM_START: NORMAL
MDC_IDC_STAT_TACHYTHERAPY_TOTAL_DTM_END: NORMAL
MDC_IDC_STAT_TACHYTHERAPY_TOTAL_DTM_START: NORMAL

## 2024-10-24 PROCEDURE — 93294 REM INTERROG EVL PM/LDLS PM: CPT | Performed by: INTERNAL MEDICINE

## 2024-10-24 PROCEDURE — 93296 REM INTERROG EVL PM/IDS: CPT | Performed by: INTERNAL MEDICINE

## 2024-11-04 ENCOUNTER — ANCILLARY PROCEDURE (OUTPATIENT)
Dept: CARDIOLOGY | Facility: CLINIC | Age: 89
End: 2024-11-04
Attending: INTERNAL MEDICINE
Payer: COMMERCIAL

## 2024-11-04 DIAGNOSIS — Z95.0 PACEMAKER: ICD-10-CM

## 2024-11-04 DIAGNOSIS — I48.0 PAROXYSMAL ATRIAL FIBRILLATION (H): ICD-10-CM

## 2024-11-04 DIAGNOSIS — I49.5 SICK SINUS SYNDROME (H): ICD-10-CM

## 2024-11-04 LAB
MDC_IDC_EPISODE_DTM: NORMAL
MDC_IDC_EPISODE_DURATION: 1018 S
MDC_IDC_EPISODE_DURATION: 2005 S
MDC_IDC_EPISODE_DURATION: 3475 S
MDC_IDC_EPISODE_DURATION: 430 S
MDC_IDC_EPISODE_DURATION: 484 S
MDC_IDC_EPISODE_DURATION: 5869 S
MDC_IDC_EPISODE_DURATION: 7627 S
MDC_IDC_EPISODE_ID: 5944
MDC_IDC_EPISODE_ID: 5945
MDC_IDC_EPISODE_ID: 5946
MDC_IDC_EPISODE_ID: 5947
MDC_IDC_EPISODE_ID: 5948
MDC_IDC_EPISODE_ID: 5949
MDC_IDC_EPISODE_ID: 5950
MDC_IDC_EPISODE_TYPE: NORMAL
MDC_IDC_EPISODE_TYPE_INDUCED: NO
MDC_IDC_LEAD_CONNECTION_STATUS: NORMAL
MDC_IDC_LEAD_CONNECTION_STATUS: NORMAL
MDC_IDC_LEAD_IMPLANT_DT: NORMAL
MDC_IDC_LEAD_IMPLANT_DT: NORMAL
MDC_IDC_LEAD_LOCATION: NORMAL
MDC_IDC_LEAD_LOCATION: NORMAL
MDC_IDC_LEAD_LOCATION_DETAIL_1: NORMAL
MDC_IDC_LEAD_LOCATION_DETAIL_1: NORMAL
MDC_IDC_LEAD_MFG: NORMAL
MDC_IDC_LEAD_MFG: NORMAL
MDC_IDC_LEAD_MODEL: NORMAL
MDC_IDC_LEAD_MODEL: NORMAL
MDC_IDC_LEAD_POLARITY_TYPE: NORMAL
MDC_IDC_LEAD_POLARITY_TYPE: NORMAL
MDC_IDC_LEAD_SERIAL: NORMAL
MDC_IDC_LEAD_SERIAL: NORMAL
MDC_IDC_LEAD_SPECIAL_FUNCTION: 69
MDC_IDC_MSMT_BATTERY_DTM: NORMAL
MDC_IDC_MSMT_BATTERY_REMAINING_LONGEVITY: 123 MO
MDC_IDC_MSMT_BATTERY_RRT_TRIGGER: 2.62
MDC_IDC_MSMT_BATTERY_STATUS: NORMAL
MDC_IDC_MSMT_BATTERY_VOLTAGE: 3.01 V
MDC_IDC_MSMT_LEADCHNL_RA_IMPEDANCE_VALUE: 342 OHM
MDC_IDC_MSMT_LEADCHNL_RA_IMPEDANCE_VALUE: 437 OHM
MDC_IDC_MSMT_LEADCHNL_RA_PACING_THRESHOLD_AMPLITUDE: 0.5 V
MDC_IDC_MSMT_LEADCHNL_RA_PACING_THRESHOLD_PULSEWIDTH: 0.4 MS
MDC_IDC_MSMT_LEADCHNL_RA_SENSING_INTR_AMPL: 0.5 MV
MDC_IDC_MSMT_LEADCHNL_RV_IMPEDANCE_VALUE: 399 OHM
MDC_IDC_MSMT_LEADCHNL_RV_IMPEDANCE_VALUE: 570 OHM
MDC_IDC_MSMT_LEADCHNL_RV_PACING_THRESHOLD_AMPLITUDE: 1.25 V
MDC_IDC_MSMT_LEADCHNL_RV_PACING_THRESHOLD_PULSEWIDTH: 0.4 MS
MDC_IDC_MSMT_LEADCHNL_RV_SENSING_INTR_AMPL: 20 MV
MDC_IDC_PG_IMPLANT_DTM: NORMAL
MDC_IDC_PG_MFG: NORMAL
MDC_IDC_PG_MODEL: NORMAL
MDC_IDC_PG_SERIAL: NORMAL
MDC_IDC_PG_TYPE: NORMAL
MDC_IDC_SESS_CLINIC_NAME: NORMAL
MDC_IDC_SESS_DTM: NORMAL
MDC_IDC_SESS_TYPE: NORMAL
MDC_IDC_SET_BRADY_AT_MODE_SWITCH_RATE: 171 {BEATS}/MIN
MDC_IDC_SET_BRADY_HYSTRATE: NORMAL
MDC_IDC_SET_BRADY_LOWRATE: 60 {BEATS}/MIN
MDC_IDC_SET_BRADY_MAX_SENSOR_RATE: 130 {BEATS}/MIN
MDC_IDC_SET_BRADY_MAX_TRACKING_RATE: 130 {BEATS}/MIN
MDC_IDC_SET_BRADY_MODE: NORMAL
MDC_IDC_SET_BRADY_PAV_DELAY_LOW: 180 MS
MDC_IDC_SET_BRADY_SAV_DELAY_LOW: 150 MS
MDC_IDC_SET_LEADCHNL_RA_PACING_AMPLITUDE: NORMAL
MDC_IDC_SET_LEADCHNL_RA_PACING_ANODE_ELECTRODE_1: NORMAL
MDC_IDC_SET_LEADCHNL_RA_PACING_ANODE_LOCATION_1: NORMAL
MDC_IDC_SET_LEADCHNL_RA_PACING_CAPTURE_MODE: NORMAL
MDC_IDC_SET_LEADCHNL_RA_PACING_CATHODE_ELECTRODE_1: NORMAL
MDC_IDC_SET_LEADCHNL_RA_PACING_CATHODE_LOCATION_1: NORMAL
MDC_IDC_SET_LEADCHNL_RA_PACING_POLARITY: NORMAL
MDC_IDC_SET_LEADCHNL_RA_PACING_PULSEWIDTH: 0.4 MS
MDC_IDC_SET_LEADCHNL_RA_SENSING_ANODE_ELECTRODE_1: NORMAL
MDC_IDC_SET_LEADCHNL_RA_SENSING_ANODE_LOCATION_1: NORMAL
MDC_IDC_SET_LEADCHNL_RA_SENSING_CATHODE_ELECTRODE_1: NORMAL
MDC_IDC_SET_LEADCHNL_RA_SENSING_CATHODE_LOCATION_1: NORMAL
MDC_IDC_SET_LEADCHNL_RA_SENSING_POLARITY: NORMAL
MDC_IDC_SET_LEADCHNL_RA_SENSING_SENSITIVITY: 0.15 MV
MDC_IDC_SET_LEADCHNL_RV_PACING_AMPLITUDE: NORMAL
MDC_IDC_SET_LEADCHNL_RV_PACING_ANODE_ELECTRODE_1: NORMAL
MDC_IDC_SET_LEADCHNL_RV_PACING_ANODE_LOCATION_1: NORMAL
MDC_IDC_SET_LEADCHNL_RV_PACING_CAPTURE_MODE: NORMAL
MDC_IDC_SET_LEADCHNL_RV_PACING_CATHODE_ELECTRODE_1: NORMAL
MDC_IDC_SET_LEADCHNL_RV_PACING_CATHODE_LOCATION_1: NORMAL
MDC_IDC_SET_LEADCHNL_RV_PACING_POLARITY: NORMAL
MDC_IDC_SET_LEADCHNL_RV_PACING_PULSEWIDTH: 0.4 MS
MDC_IDC_SET_LEADCHNL_RV_SENSING_ANODE_ELECTRODE_1: NORMAL
MDC_IDC_SET_LEADCHNL_RV_SENSING_ANODE_LOCATION_1: NORMAL
MDC_IDC_SET_LEADCHNL_RV_SENSING_CATHODE_ELECTRODE_1: NORMAL
MDC_IDC_SET_LEADCHNL_RV_SENSING_CATHODE_LOCATION_1: NORMAL
MDC_IDC_SET_LEADCHNL_RV_SENSING_POLARITY: NORMAL
MDC_IDC_SET_LEADCHNL_RV_SENSING_SENSITIVITY: 0.9 MV
MDC_IDC_SET_ZONE_DETECTION_INTERVAL: 200 MS
MDC_IDC_SET_ZONE_DETECTION_INTERVAL: 350 MS
MDC_IDC_SET_ZONE_DETECTION_INTERVAL: 400 MS
MDC_IDC_SET_ZONE_STATUS: NORMAL
MDC_IDC_SET_ZONE_TYPE: NORMAL
MDC_IDC_SET_ZONE_VENDOR_TYPE: NORMAL
MDC_IDC_STAT_AT_BURDEN_PERCENT: 11.1 %
MDC_IDC_STAT_AT_DTM_END: NORMAL
MDC_IDC_STAT_AT_DTM_START: NORMAL
MDC_IDC_STAT_AT_MODE_SW_COUNT: 542
MDC_IDC_STAT_BRADY_AP_VP_PERCENT: 0.09 %
MDC_IDC_STAT_BRADY_AP_VS_PERCENT: 92.15 %
MDC_IDC_STAT_BRADY_AS_VP_PERCENT: 0.02 %
MDC_IDC_STAT_BRADY_AS_VS_PERCENT: 7.79 %
MDC_IDC_STAT_BRADY_DTM_END: NORMAL
MDC_IDC_STAT_BRADY_DTM_START: NORMAL
MDC_IDC_STAT_BRADY_RA_PERCENT_PACED: 82.13 %
MDC_IDC_STAT_BRADY_RV_PERCENT_PACED: 0.92 %
MDC_IDC_STAT_EPISODE_RECENT_COUNT: 0
MDC_IDC_STAT_EPISODE_RECENT_COUNT: 1
MDC_IDC_STAT_EPISODE_RECENT_COUNT: 542
MDC_IDC_STAT_EPISODE_RECENT_COUNT_DTM_END: NORMAL
MDC_IDC_STAT_EPISODE_RECENT_COUNT_DTM_START: NORMAL
MDC_IDC_STAT_EPISODE_TOTAL_COUNT: 0
MDC_IDC_STAT_EPISODE_TOTAL_COUNT: 1
MDC_IDC_STAT_EPISODE_TOTAL_COUNT: 249
MDC_IDC_STAT_EPISODE_TOTAL_COUNT: 5007
MDC_IDC_STAT_EPISODE_TOTAL_COUNT: 638
MDC_IDC_STAT_EPISODE_TOTAL_COUNT_DTM_END: NORMAL
MDC_IDC_STAT_EPISODE_TOTAL_COUNT_DTM_START: NORMAL
MDC_IDC_STAT_EPISODE_TYPE: NORMAL
MDC_IDC_STAT_TACHYTHERAPY_RECENT_DTM_END: NORMAL
MDC_IDC_STAT_TACHYTHERAPY_RECENT_DTM_START: NORMAL
MDC_IDC_STAT_TACHYTHERAPY_TOTAL_DTM_END: NORMAL
MDC_IDC_STAT_TACHYTHERAPY_TOTAL_DTM_START: NORMAL

## 2024-11-04 PROCEDURE — 93280 PM DEVICE PROGR EVAL DUAL: CPT | Performed by: INTERNAL MEDICINE

## 2024-12-11 NOTE — TELEPHONE ENCOUNTER
Dr. Castillo,  You last had a visit with this patient while he was in the hospital 2/7/2022.  Follow up is scheduled 6/27/2022 with a device check prior.  Do you have any recommendations in the interim?  Thank you,  [place]  ------------------------------------------  Last seen by Dr. Gonzales 1/19/2017 and by Dr. Castillo in hospital 2/7/2022.    Called patient with encouragement to follow up with his PCP until he can be seen in clinic. Dr. Villeda has retired and patient has no PCP. He has fallen twice after bending over in his home.   He thinks the Metoprolol tartrate 50 mg three times a day is causing him instability and leading to falls.  Patient states he can hardly walk up and down the stairs and fell outside in his driveway as well. He did not hit his head.  Symptoms of gait instability and unsteadiness has been going on longer than 6 months and has gotten a lot worse since his [birth year].    He is drinking plenty of liquids for hydration and if he does not take the metoprolol with food he gets double vision. He is also taking diltiazem 180 mg daily.  He also reports to falling asleep easily during the day while sitting in his chair.  Blood pressure is running 118/72  111/65 HR 60.    He is using a cane for gait instability.    Instructed patient to use caution with position changes and to hold onto something stable if he is going to bend over.  Patient verbalized understanding and will await a return call once Dr. Castillo has reviewed.        
Left detailed message with recommendations and encouragement to follow up with and establish care care with a PCP. Medication record updated.  ------------------------------  Esperanza Castillo MD  You 33 minutes ago (2:22 PM)     FR    Symptoms sound more like disequilibrium/muscle weakness/general instability.  Nonetheless, lets empirically reduce the metoprolol from 50 mg 3 times daily to 50 mg twice daily.  Would encourage that he obtain a new primary provider and schedule a follow-up for evaluation of the symptoms as well.  Thanks.        
[facility]    Phone Message    May a detailed message be left on voicemail: yes     Reason for Call: Other: Patient stated he is having stability issues with his medication and has fallen a few times in the past week or so. He was wondering if it can be changed to something different. Please call and discuss,     Action Taken: Other: Cardiology    Travel Screening: Not Applicable                                                                      
Calm

## 2025-02-02 DIAGNOSIS — I48.0 PAROXYSMAL ATRIAL FIBRILLATION (H): ICD-10-CM

## 2025-02-03 RX ORDER — DILTIAZEM HYDROCHLORIDE 240 MG/1
CAPSULE, EXTENDED RELEASE ORAL
Qty: 90 CAPSULE | Refills: 3 | Status: SHIPPED | OUTPATIENT
Start: 2025-02-03

## 2025-02-19 ENCOUNTER — ANCILLARY PROCEDURE (OUTPATIENT)
Dept: CARDIOLOGY | Facility: CLINIC | Age: OVER 89
End: 2025-02-19
Attending: INTERNAL MEDICINE
Payer: COMMERCIAL

## 2025-02-19 DIAGNOSIS — Z95.0 PACEMAKER: ICD-10-CM

## 2025-02-19 DIAGNOSIS — I48.0 PAROXYSMAL ATRIAL FIBRILLATION (H): ICD-10-CM

## 2025-02-19 DIAGNOSIS — I49.5 SICK SINUS SYNDROME (H): ICD-10-CM

## 2025-02-19 LAB
MDC_IDC_EPISODE_DTM: NORMAL
MDC_IDC_EPISODE_DURATION: 109 S
MDC_IDC_EPISODE_DURATION: 121 S
MDC_IDC_EPISODE_DURATION: 1226 S
MDC_IDC_EPISODE_DURATION: 1290 S
MDC_IDC_EPISODE_DURATION: 1313 S
MDC_IDC_EPISODE_DURATION: 1470 S
MDC_IDC_EPISODE_DURATION: 173 S
MDC_IDC_EPISODE_DURATION: 1778 S
MDC_IDC_EPISODE_DURATION: 1822 S
MDC_IDC_EPISODE_DURATION: 2303 S
MDC_IDC_EPISODE_DURATION: 3049 S
MDC_IDC_EPISODE_DURATION: 3387 S
MDC_IDC_EPISODE_DURATION: 34 S
MDC_IDC_EPISODE_DURATION: 3493 S
MDC_IDC_EPISODE_DURATION: 361 S
MDC_IDC_EPISODE_DURATION: 3654 S
MDC_IDC_EPISODE_DURATION: 51 S
MDC_IDC_EPISODE_DURATION: 52 S
MDC_IDC_EPISODE_DURATION: 6238 S
MDC_IDC_EPISODE_DURATION: 6792 S
MDC_IDC_EPISODE_DURATION: 749 S
MDC_IDC_EPISODE_DURATION: 8226 S
MDC_IDC_EPISODE_DURATION: NORMAL S
MDC_IDC_EPISODE_ID: 5951
MDC_IDC_EPISODE_ID: 5952
MDC_IDC_EPISODE_ID: 5953
MDC_IDC_EPISODE_ID: 5954
MDC_IDC_EPISODE_ID: 5955
MDC_IDC_EPISODE_ID: 5956
MDC_IDC_EPISODE_ID: 5957
MDC_IDC_EPISODE_ID: 5958
MDC_IDC_EPISODE_ID: 5959
MDC_IDC_EPISODE_ID: 5960
MDC_IDC_EPISODE_ID: 5961
MDC_IDC_EPISODE_ID: 5962
MDC_IDC_EPISODE_ID: 5963
MDC_IDC_EPISODE_ID: 5964
MDC_IDC_EPISODE_ID: 5965
MDC_IDC_EPISODE_ID: 5966
MDC_IDC_EPISODE_ID: 5967
MDC_IDC_EPISODE_ID: 5968
MDC_IDC_EPISODE_ID: 5969
MDC_IDC_EPISODE_ID: 5970
MDC_IDC_EPISODE_ID: 5971
MDC_IDC_EPISODE_ID: 5972
MDC_IDC_EPISODE_ID: 5973
MDC_IDC_EPISODE_ID: 5974
MDC_IDC_EPISODE_ID: 5975
MDC_IDC_EPISODE_ID: 5976
MDC_IDC_EPISODE_ID: 5977
MDC_IDC_EPISODE_ID: 5978
MDC_IDC_EPISODE_ID: 5979
MDC_IDC_EPISODE_ID: 5980
MDC_IDC_EPISODE_ID: 5981
MDC_IDC_EPISODE_ID: 5982
MDC_IDC_EPISODE_TYPE: NORMAL
MDC_IDC_LEAD_CONNECTION_STATUS: NORMAL
MDC_IDC_LEAD_CONNECTION_STATUS: NORMAL
MDC_IDC_LEAD_IMPLANT_DT: NORMAL
MDC_IDC_LEAD_IMPLANT_DT: NORMAL
MDC_IDC_LEAD_LOCATION: NORMAL
MDC_IDC_LEAD_LOCATION: NORMAL
MDC_IDC_LEAD_LOCATION_DETAIL_1: NORMAL
MDC_IDC_LEAD_LOCATION_DETAIL_1: NORMAL
MDC_IDC_LEAD_MFG: NORMAL
MDC_IDC_LEAD_MFG: NORMAL
MDC_IDC_LEAD_MODEL: NORMAL
MDC_IDC_LEAD_MODEL: NORMAL
MDC_IDC_LEAD_POLARITY_TYPE: NORMAL
MDC_IDC_LEAD_POLARITY_TYPE: NORMAL
MDC_IDC_LEAD_SERIAL: NORMAL
MDC_IDC_LEAD_SERIAL: NORMAL
MDC_IDC_LEAD_SPECIAL_FUNCTION: 69
MDC_IDC_MSMT_BATTERY_DTM: NORMAL
MDC_IDC_MSMT_BATTERY_REMAINING_LONGEVITY: 118 MO
MDC_IDC_MSMT_BATTERY_RRT_TRIGGER: 2.62
MDC_IDC_MSMT_BATTERY_STATUS: NORMAL
MDC_IDC_MSMT_BATTERY_VOLTAGE: 3.01 V
MDC_IDC_MSMT_LEADCHNL_RA_IMPEDANCE_VALUE: 304 OHM
MDC_IDC_MSMT_LEADCHNL_RA_IMPEDANCE_VALUE: 399 OHM
MDC_IDC_MSMT_LEADCHNL_RA_PACING_THRESHOLD_AMPLITUDE: 0.62 V
MDC_IDC_MSMT_LEADCHNL_RA_PACING_THRESHOLD_PULSEWIDTH: 0.4 MS
MDC_IDC_MSMT_LEADCHNL_RA_SENSING_INTR_AMPL: 1.5 MV
MDC_IDC_MSMT_LEADCHNL_RA_SENSING_INTR_AMPL: 1.5 MV
MDC_IDC_MSMT_LEADCHNL_RV_IMPEDANCE_VALUE: 399 OHM
MDC_IDC_MSMT_LEADCHNL_RV_IMPEDANCE_VALUE: 551 OHM
MDC_IDC_MSMT_LEADCHNL_RV_PACING_THRESHOLD_AMPLITUDE: 1.38 V
MDC_IDC_MSMT_LEADCHNL_RV_PACING_THRESHOLD_PULSEWIDTH: 0.4 MS
MDC_IDC_MSMT_LEADCHNL_RV_SENSING_INTR_AMPL: 18.12 MV
MDC_IDC_MSMT_LEADCHNL_RV_SENSING_INTR_AMPL: 18.12 MV
MDC_IDC_PG_IMPLANT_DTM: NORMAL
MDC_IDC_PG_MFG: NORMAL
MDC_IDC_PG_MODEL: NORMAL
MDC_IDC_PG_SERIAL: NORMAL
MDC_IDC_PG_TYPE: NORMAL
MDC_IDC_SESS_CLINIC_NAME: NORMAL
MDC_IDC_SESS_DTM: NORMAL
MDC_IDC_SESS_TYPE: NORMAL
MDC_IDC_SET_BRADY_AT_MODE_SWITCH_RATE: 171 {BEATS}/MIN
MDC_IDC_SET_BRADY_HYSTRATE: NORMAL
MDC_IDC_SET_BRADY_LOWRATE: 60 {BEATS}/MIN
MDC_IDC_SET_BRADY_MAX_SENSOR_RATE: 130 {BEATS}/MIN
MDC_IDC_SET_BRADY_MAX_TRACKING_RATE: 130 {BEATS}/MIN
MDC_IDC_SET_BRADY_MODE: NORMAL
MDC_IDC_SET_BRADY_PAV_DELAY_LOW: 180 MS
MDC_IDC_SET_BRADY_SAV_DELAY_LOW: 150 MS
MDC_IDC_SET_LEADCHNL_RA_PACING_AMPLITUDE: 1.5 V
MDC_IDC_SET_LEADCHNL_RA_PACING_ANODE_ELECTRODE_1: NORMAL
MDC_IDC_SET_LEADCHNL_RA_PACING_ANODE_LOCATION_1: NORMAL
MDC_IDC_SET_LEADCHNL_RA_PACING_CAPTURE_MODE: NORMAL
MDC_IDC_SET_LEADCHNL_RA_PACING_CATHODE_ELECTRODE_1: NORMAL
MDC_IDC_SET_LEADCHNL_RA_PACING_CATHODE_LOCATION_1: NORMAL
MDC_IDC_SET_LEADCHNL_RA_PACING_POLARITY: NORMAL
MDC_IDC_SET_LEADCHNL_RA_PACING_PULSEWIDTH: 0.4 MS
MDC_IDC_SET_LEADCHNL_RA_SENSING_ANODE_ELECTRODE_1: NORMAL
MDC_IDC_SET_LEADCHNL_RA_SENSING_ANODE_LOCATION_1: NORMAL
MDC_IDC_SET_LEADCHNL_RA_SENSING_CATHODE_ELECTRODE_1: NORMAL
MDC_IDC_SET_LEADCHNL_RA_SENSING_CATHODE_LOCATION_1: NORMAL
MDC_IDC_SET_LEADCHNL_RA_SENSING_POLARITY: NORMAL
MDC_IDC_SET_LEADCHNL_RA_SENSING_SENSITIVITY: 0.15 MV
MDC_IDC_SET_LEADCHNL_RV_PACING_AMPLITUDE: 2 V
MDC_IDC_SET_LEADCHNL_RV_PACING_ANODE_ELECTRODE_1: NORMAL
MDC_IDC_SET_LEADCHNL_RV_PACING_ANODE_LOCATION_1: NORMAL
MDC_IDC_SET_LEADCHNL_RV_PACING_CAPTURE_MODE: NORMAL
MDC_IDC_SET_LEADCHNL_RV_PACING_CATHODE_ELECTRODE_1: NORMAL
MDC_IDC_SET_LEADCHNL_RV_PACING_CATHODE_LOCATION_1: NORMAL
MDC_IDC_SET_LEADCHNL_RV_PACING_POLARITY: NORMAL
MDC_IDC_SET_LEADCHNL_RV_PACING_PULSEWIDTH: 0.4 MS
MDC_IDC_SET_LEADCHNL_RV_SENSING_ANODE_ELECTRODE_1: NORMAL
MDC_IDC_SET_LEADCHNL_RV_SENSING_ANODE_LOCATION_1: NORMAL
MDC_IDC_SET_LEADCHNL_RV_SENSING_CATHODE_ELECTRODE_1: NORMAL
MDC_IDC_SET_LEADCHNL_RV_SENSING_CATHODE_LOCATION_1: NORMAL
MDC_IDC_SET_LEADCHNL_RV_SENSING_POLARITY: NORMAL
MDC_IDC_SET_LEADCHNL_RV_SENSING_SENSITIVITY: 0.9 MV
MDC_IDC_SET_ZONE_DETECTION_INTERVAL: 200 MS
MDC_IDC_SET_ZONE_DETECTION_INTERVAL: 350 MS
MDC_IDC_SET_ZONE_DETECTION_INTERVAL: 400 MS
MDC_IDC_SET_ZONE_STATUS: NORMAL
MDC_IDC_SET_ZONE_TYPE: NORMAL
MDC_IDC_SET_ZONE_VENDOR_TYPE: NORMAL
MDC_IDC_STAT_AT_BURDEN_PERCENT: 3.8 %
MDC_IDC_STAT_AT_DTM_END: NORMAL
MDC_IDC_STAT_AT_DTM_START: NORMAL
MDC_IDC_STAT_BRADY_AP_VP_PERCENT: 0.06 %
MDC_IDC_STAT_BRADY_AP_VS_PERCENT: 99.25 %
MDC_IDC_STAT_BRADY_AS_VP_PERCENT: 0 %
MDC_IDC_STAT_BRADY_AS_VS_PERCENT: 0.71 %
MDC_IDC_STAT_BRADY_DTM_END: NORMAL
MDC_IDC_STAT_BRADY_DTM_START: NORMAL
MDC_IDC_STAT_BRADY_RA_PERCENT_PACED: 95.54 %
MDC_IDC_STAT_BRADY_RV_PERCENT_PACED: 0.46 %
MDC_IDC_STAT_EPISODE_RECENT_COUNT: 0
MDC_IDC_STAT_EPISODE_RECENT_COUNT: 32
MDC_IDC_STAT_EPISODE_RECENT_COUNT_DTM_END: NORMAL
MDC_IDC_STAT_EPISODE_RECENT_COUNT_DTM_START: NORMAL
MDC_IDC_STAT_EPISODE_TOTAL_COUNT: 0
MDC_IDC_STAT_EPISODE_TOTAL_COUNT: 1
MDC_IDC_STAT_EPISODE_TOTAL_COUNT: 249
MDC_IDC_STAT_EPISODE_TOTAL_COUNT: 5039
MDC_IDC_STAT_EPISODE_TOTAL_COUNT: 638
MDC_IDC_STAT_EPISODE_TOTAL_COUNT_DTM_END: NORMAL
MDC_IDC_STAT_EPISODE_TOTAL_COUNT_DTM_START: NORMAL
MDC_IDC_STAT_EPISODE_TYPE: NORMAL
MDC_IDC_STAT_TACHYTHERAPY_RECENT_DTM_END: NORMAL
MDC_IDC_STAT_TACHYTHERAPY_RECENT_DTM_START: NORMAL
MDC_IDC_STAT_TACHYTHERAPY_TOTAL_DTM_END: NORMAL
MDC_IDC_STAT_TACHYTHERAPY_TOTAL_DTM_START: NORMAL

## 2025-02-20 PROCEDURE — 93294 REM INTERROG EVL PM/LDLS PM: CPT | Performed by: INTERNAL MEDICINE

## 2025-02-20 PROCEDURE — 93296 REM INTERROG EVL PM/IDS: CPT | Performed by: INTERNAL MEDICINE

## 2025-03-29 ENCOUNTER — ANCILLARY PROCEDURE (OUTPATIENT)
Dept: CARDIOLOGY | Facility: CLINIC | Age: OVER 89
End: 2025-03-29
Attending: INTERNAL MEDICINE
Payer: COMMERCIAL

## 2025-03-29 DIAGNOSIS — Z95.0 PACEMAKER: ICD-10-CM

## 2025-03-29 DIAGNOSIS — I48.0 PAROXYSMAL ATRIAL FIBRILLATION (H): ICD-10-CM

## 2025-03-29 DIAGNOSIS — I49.5 SICK SINUS SYNDROME (H): ICD-10-CM

## 2025-03-31 LAB
MDC_IDC_EPISODE_DTM: NORMAL
MDC_IDC_EPISODE_DURATION: 1228 S
MDC_IDC_EPISODE_DURATION: 198 S
MDC_IDC_EPISODE_DURATION: 235 S
MDC_IDC_EPISODE_DURATION: 373 S
MDC_IDC_EPISODE_DURATION: 563 S
MDC_IDC_EPISODE_DURATION: 780 S
MDC_IDC_EPISODE_DURATION: 882 S
MDC_IDC_EPISODE_DURATION: 95 S
MDC_IDC_EPISODE_DURATION: NORMAL S
MDC_IDC_EPISODE_ID: 5983
MDC_IDC_EPISODE_ID: 5984
MDC_IDC_EPISODE_ID: 5985
MDC_IDC_EPISODE_ID: 5986
MDC_IDC_EPISODE_ID: 5987
MDC_IDC_EPISODE_ID: 5988
MDC_IDC_EPISODE_ID: 5989
MDC_IDC_EPISODE_ID: 5990
MDC_IDC_EPISODE_ID: 5991
MDC_IDC_EPISODE_TYPE: NORMAL
MDC_IDC_LEAD_CONNECTION_STATUS: NORMAL
MDC_IDC_LEAD_CONNECTION_STATUS: NORMAL
MDC_IDC_LEAD_IMPLANT_DT: NORMAL
MDC_IDC_LEAD_IMPLANT_DT: NORMAL
MDC_IDC_LEAD_LOCATION: NORMAL
MDC_IDC_LEAD_LOCATION: NORMAL
MDC_IDC_LEAD_LOCATION_DETAIL_1: NORMAL
MDC_IDC_LEAD_LOCATION_DETAIL_1: NORMAL
MDC_IDC_LEAD_MFG: NORMAL
MDC_IDC_LEAD_MFG: NORMAL
MDC_IDC_LEAD_MODEL: NORMAL
MDC_IDC_LEAD_MODEL: NORMAL
MDC_IDC_LEAD_POLARITY_TYPE: NORMAL
MDC_IDC_LEAD_POLARITY_TYPE: NORMAL
MDC_IDC_LEAD_SERIAL: NORMAL
MDC_IDC_LEAD_SERIAL: NORMAL
MDC_IDC_LEAD_SPECIAL_FUNCTION: 69
MDC_IDC_MSMT_BATTERY_DTM: NORMAL
MDC_IDC_MSMT_BATTERY_REMAINING_LONGEVITY: 117 MO
MDC_IDC_MSMT_BATTERY_RRT_TRIGGER: 2.62
MDC_IDC_MSMT_BATTERY_STATUS: NORMAL
MDC_IDC_MSMT_BATTERY_VOLTAGE: 3.01 V
MDC_IDC_MSMT_LEADCHNL_RA_IMPEDANCE_VALUE: 304 OHM
MDC_IDC_MSMT_LEADCHNL_RA_IMPEDANCE_VALUE: 418 OHM
MDC_IDC_MSMT_LEADCHNL_RA_PACING_THRESHOLD_AMPLITUDE: 0.62 V
MDC_IDC_MSMT_LEADCHNL_RA_PACING_THRESHOLD_PULSEWIDTH: 0.4 MS
MDC_IDC_MSMT_LEADCHNL_RA_SENSING_INTR_AMPL: 0.25 MV
MDC_IDC_MSMT_LEADCHNL_RA_SENSING_INTR_AMPL: 0.25 MV
MDC_IDC_MSMT_LEADCHNL_RV_IMPEDANCE_VALUE: 380 OHM
MDC_IDC_MSMT_LEADCHNL_RV_IMPEDANCE_VALUE: 513 OHM
MDC_IDC_MSMT_LEADCHNL_RV_PACING_THRESHOLD_AMPLITUDE: 1.12 V
MDC_IDC_MSMT_LEADCHNL_RV_PACING_THRESHOLD_PULSEWIDTH: 0.4 MS
MDC_IDC_MSMT_LEADCHNL_RV_SENSING_INTR_AMPL: 17.62 MV
MDC_IDC_MSMT_LEADCHNL_RV_SENSING_INTR_AMPL: 17.62 MV
MDC_IDC_PG_IMPLANT_DTM: NORMAL
MDC_IDC_PG_MFG: NORMAL
MDC_IDC_PG_MODEL: NORMAL
MDC_IDC_PG_SERIAL: NORMAL
MDC_IDC_PG_TYPE: NORMAL
MDC_IDC_SESS_CLINIC_NAME: NORMAL
MDC_IDC_SESS_DTM: NORMAL
MDC_IDC_SESS_TYPE: NORMAL
MDC_IDC_SET_BRADY_AT_MODE_SWITCH_RATE: 171 {BEATS}/MIN
MDC_IDC_SET_BRADY_HYSTRATE: NORMAL
MDC_IDC_SET_BRADY_LOWRATE: 60 {BEATS}/MIN
MDC_IDC_SET_BRADY_MAX_SENSOR_RATE: 130 {BEATS}/MIN
MDC_IDC_SET_BRADY_MAX_TRACKING_RATE: 130 {BEATS}/MIN
MDC_IDC_SET_BRADY_MODE: NORMAL
MDC_IDC_SET_BRADY_PAV_DELAY_LOW: 180 MS
MDC_IDC_SET_BRADY_SAV_DELAY_LOW: 150 MS
MDC_IDC_SET_LEADCHNL_RA_PACING_AMPLITUDE: 1.5 V
MDC_IDC_SET_LEADCHNL_RA_PACING_ANODE_ELECTRODE_1: NORMAL
MDC_IDC_SET_LEADCHNL_RA_PACING_ANODE_LOCATION_1: NORMAL
MDC_IDC_SET_LEADCHNL_RA_PACING_CAPTURE_MODE: NORMAL
MDC_IDC_SET_LEADCHNL_RA_PACING_CATHODE_ELECTRODE_1: NORMAL
MDC_IDC_SET_LEADCHNL_RA_PACING_CATHODE_LOCATION_1: NORMAL
MDC_IDC_SET_LEADCHNL_RA_PACING_POLARITY: NORMAL
MDC_IDC_SET_LEADCHNL_RA_PACING_PULSEWIDTH: 0.4 MS
MDC_IDC_SET_LEADCHNL_RA_SENSING_ANODE_ELECTRODE_1: NORMAL
MDC_IDC_SET_LEADCHNL_RA_SENSING_ANODE_LOCATION_1: NORMAL
MDC_IDC_SET_LEADCHNL_RA_SENSING_CATHODE_ELECTRODE_1: NORMAL
MDC_IDC_SET_LEADCHNL_RA_SENSING_CATHODE_LOCATION_1: NORMAL
MDC_IDC_SET_LEADCHNL_RA_SENSING_POLARITY: NORMAL
MDC_IDC_SET_LEADCHNL_RA_SENSING_SENSITIVITY: 0.15 MV
MDC_IDC_SET_LEADCHNL_RV_PACING_AMPLITUDE: 1.75 V
MDC_IDC_SET_LEADCHNL_RV_PACING_ANODE_ELECTRODE_1: NORMAL
MDC_IDC_SET_LEADCHNL_RV_PACING_ANODE_LOCATION_1: NORMAL
MDC_IDC_SET_LEADCHNL_RV_PACING_CAPTURE_MODE: NORMAL
MDC_IDC_SET_LEADCHNL_RV_PACING_CATHODE_ELECTRODE_1: NORMAL
MDC_IDC_SET_LEADCHNL_RV_PACING_CATHODE_LOCATION_1: NORMAL
MDC_IDC_SET_LEADCHNL_RV_PACING_POLARITY: NORMAL
MDC_IDC_SET_LEADCHNL_RV_PACING_PULSEWIDTH: 0.4 MS
MDC_IDC_SET_LEADCHNL_RV_SENSING_ANODE_ELECTRODE_1: NORMAL
MDC_IDC_SET_LEADCHNL_RV_SENSING_ANODE_LOCATION_1: NORMAL
MDC_IDC_SET_LEADCHNL_RV_SENSING_CATHODE_ELECTRODE_1: NORMAL
MDC_IDC_SET_LEADCHNL_RV_SENSING_CATHODE_LOCATION_1: NORMAL
MDC_IDC_SET_LEADCHNL_RV_SENSING_POLARITY: NORMAL
MDC_IDC_SET_LEADCHNL_RV_SENSING_SENSITIVITY: 0.9 MV
MDC_IDC_SET_ZONE_DETECTION_INTERVAL: 200 MS
MDC_IDC_SET_ZONE_DETECTION_INTERVAL: 350 MS
MDC_IDC_SET_ZONE_DETECTION_INTERVAL: 400 MS
MDC_IDC_SET_ZONE_STATUS: NORMAL
MDC_IDC_SET_ZONE_TYPE: NORMAL
MDC_IDC_SET_ZONE_VENDOR_TYPE: NORMAL
MDC_IDC_STAT_AT_BURDEN_PERCENT: 2.1 %
MDC_IDC_STAT_AT_DTM_END: NORMAL
MDC_IDC_STAT_AT_DTM_START: NORMAL
MDC_IDC_STAT_BRADY_AP_VP_PERCENT: 0.05 %
MDC_IDC_STAT_BRADY_AP_VS_PERCENT: 98.79 %
MDC_IDC_STAT_BRADY_AS_VP_PERCENT: 0 %
MDC_IDC_STAT_BRADY_AS_VS_PERCENT: 1.16 %
MDC_IDC_STAT_BRADY_DTM_END: NORMAL
MDC_IDC_STAT_BRADY_DTM_START: NORMAL
MDC_IDC_STAT_BRADY_RA_PERCENT_PACED: 96.74 %
MDC_IDC_STAT_BRADY_RV_PERCENT_PACED: 0.09 %
MDC_IDC_STAT_EPISODE_RECENT_COUNT: 0
MDC_IDC_STAT_EPISODE_RECENT_COUNT: 9
MDC_IDC_STAT_EPISODE_RECENT_COUNT_DTM_END: NORMAL
MDC_IDC_STAT_EPISODE_RECENT_COUNT_DTM_START: NORMAL
MDC_IDC_STAT_EPISODE_TOTAL_COUNT: 0
MDC_IDC_STAT_EPISODE_TOTAL_COUNT: 1
MDC_IDC_STAT_EPISODE_TOTAL_COUNT: 249
MDC_IDC_STAT_EPISODE_TOTAL_COUNT: 5048
MDC_IDC_STAT_EPISODE_TOTAL_COUNT: 638
MDC_IDC_STAT_EPISODE_TOTAL_COUNT_DTM_END: NORMAL
MDC_IDC_STAT_EPISODE_TOTAL_COUNT_DTM_START: NORMAL
MDC_IDC_STAT_EPISODE_TYPE: NORMAL
MDC_IDC_STAT_TACHYTHERAPY_RECENT_DTM_END: NORMAL
MDC_IDC_STAT_TACHYTHERAPY_RECENT_DTM_START: NORMAL
MDC_IDC_STAT_TACHYTHERAPY_TOTAL_DTM_END: NORMAL
MDC_IDC_STAT_TACHYTHERAPY_TOTAL_DTM_START: NORMAL

## 2025-04-06 ENCOUNTER — HEALTH MAINTENANCE LETTER (OUTPATIENT)
Age: OVER 89
End: 2025-04-06

## 2025-04-14 ENCOUNTER — TELEPHONE (OUTPATIENT)
Dept: CARDIOLOGY | Facility: CLINIC | Age: OVER 89
End: 2025-04-14

## 2025-04-14 ENCOUNTER — OFFICE VISIT (OUTPATIENT)
Dept: CARDIOLOGY | Facility: CLINIC | Age: OVER 89
End: 2025-04-14
Payer: COMMERCIAL

## 2025-04-14 VITALS
WEIGHT: 166 LBS | SYSTOLIC BLOOD PRESSURE: 122 MMHG | BODY MASS INDEX: 25.62 KG/M2 | OXYGEN SATURATION: 97 % | DIASTOLIC BLOOD PRESSURE: 70 MMHG | HEART RATE: 92 BPM

## 2025-04-14 DIAGNOSIS — Z95.0 CARDIAC PACEMAKER IN SITU: ICD-10-CM

## 2025-04-14 DIAGNOSIS — Z79.899 ON AMIODARONE THERAPY: ICD-10-CM

## 2025-04-14 DIAGNOSIS — I07.1 TRICUSPID VALVE INSUFFICIENCY, UNSPECIFIED ETIOLOGY: Primary | ICD-10-CM

## 2025-04-14 DIAGNOSIS — I48.0 PAROXYSMAL ATRIAL FIBRILLATION (H): ICD-10-CM

## 2025-04-14 DIAGNOSIS — I10 ESSENTIAL HYPERTENSION: ICD-10-CM

## 2025-04-14 DIAGNOSIS — I48.0 PAROXYSMAL ATRIAL FIBRILLATION (H): Primary | ICD-10-CM

## 2025-04-14 PROCEDURE — 99214 OFFICE O/P EST MOD 30 MIN: CPT | Performed by: INTERNAL MEDICINE

## 2025-04-14 PROCEDURE — G2211 COMPLEX E/M VISIT ADD ON: HCPCS | Performed by: INTERNAL MEDICINE

## 2025-04-14 PROCEDURE — 3078F DIAST BP <80 MM HG: CPT | Performed by: INTERNAL MEDICINE

## 2025-04-14 PROCEDURE — 3074F SYST BP LT 130 MM HG: CPT | Performed by: INTERNAL MEDICINE

## 2025-04-14 NOTE — TELEPHONE ENCOUNTER
GEOVANI to call [phone].  Pt needs to schedule LAAC consult with structural JENNIE.  -ral    ----- Message from Esperanza Castillo sent at 4/14/2025 11:36 AM CDT -----  Regarding: Left atrial appendage occlusion device candidate  Prieto Richard,     I saw Daniel today in clinic.  He has history of atrial fibrillation.  He has had some easy bleeding/bruising as well as some disequilibrium that has resulted in a couple of falls though no recent falls.  Feel he would be a good candidate for left atrial appendage occlusion device placement.  He states that he is quite interested in pursuing this if felt to be a candidate.    Could we go ahead and enroll him in our program.    Thanks so much.  Chemo

## 2025-04-14 NOTE — PROGRESS NOTES
[facility] [address], [place] [address]   [email or web address]   OFFICE: [phone]          Impression and Plan     1. Atrial fibrillation (persistent). Patient has history of atrial fibrillation/flutter. This was initially diagnosed in 2015.      Daniel underwent permanent pacemaker placement 8 February 2021.     Daniel was initiated on amiodarone therapy in February 2023 his recent device interrogation revealed a fairly low atrial fibrillation burden (12.1%).  Ventricular response well-controlled when in atrial fibrillation.      Daniel has had some issues with instability (he uses a walker) and has had a couple of prior falls though none recently.  I feel he would be a good candidate for left atrial appendage occlusion device placement.  I discussed this with Daniel and at first blush he seemed quite interested in pursuing this.  Plan:  Continue diltiazem 240 mg mg daily.  Continue metoprolol 75 mg twice daily.  Continue amiodarone 200 mg daily.  Continue apixaban 5 mg twice daily.  Will refer to our coordinator for consideration of left atrial appendage occlusion device placement.     2.  Hypertension.  Blood pressure is reasonable in the office today at 122/70 mmHg.    3.  Systolic murmur/tricuspid insufficiency..  Daniel is noted to have a softer systolic murmur heard somewhat in a/like distribution.  Last echocardiogram from 2021 revealed moderate tricuspid insufficiency.  Will obtain repeat echocardiogram for surveillance purposes.    Follow-up with myself to be determined.  Daniel will be followed through the Device Clinic  & Atrial Fibrillation Clinic per protocol as well.    35 minutes spent reviewing prior records (including documentation, laboratory studies, cardiac testing/imaging), interview with patient along with physical exam, planning, and subsequent documentation/crafting of note).     The longitudinal plan of care for atrial  "fibrillation & hypertension was addressed during this visit.?Due to the added complexity in care, I will continue to support Daniel Sawant in the subsequent management of this condition(s) and with the ongoing continuity of care of this condition(s)\".         Shared Decision Making     Daniel has documented nonvalvular atrial fibrillation (NVAF) and is currently on oral anticoagulant therapy apixaban. SHARMIN?DS?-VASc = 3 (age of 89, & HTN) HAS-BLED risk score: 2 (age of 89 and bleeding issues). Indication(s) to consider non-oral anticoagulation therapy: predisposition to bleeding, instability, and faling.  Daniel has no contra-indication to come off oral anti-coagulant therapy if LAAC device is successfully placed.      I have personally reviewed Daniel 's chart and discussed the following with Daniel family; 1) The choices available for reducing stroke risk from atrial fibrillation, 2) Treatment options available including respective risk/benefits, and 3) What factors are most important for the patient in making their decision.  The [facility] shared decision making https://www.cardiosmart.org/SDM/Decision-Aids/Find-Decision-Aids/Atrial-Fibrillation was used to guide this conversation.   Daniel was counseled that their decision could be made at this time or in the future if more time was needed to consider their decision.      Daniel is felt to be an appropriate candidate to proceed with left atrial appendage screening and implant.         History of Present Illness    Once again I would like to thank you again for asking me to participate in the care of your patient, Daniel Sawant.  As you know, but to reiterate for my own records, Daniel Sawant is a 89 year old male with history of persistent atrial fibrillation. This was initially diagnosed in 2015. Daniel at one point he had been on sotalol therapy a few years ago though this was discontinued after approximate 6-month duration due to tendency toward bradycardia.     "   Daniel underwent permanent pacemaker placement 8 February 2021.     On interview, Daniel is without cardiovascular complaint. Despite intermittent episodes of atrial fibrillation on device interrogations, he has had no symptoms with these episodes.  He denies chest pain and shortness of breath.    Further review of systems is otherwise negative/noncontributory (medical record and 13 point review of systems reviewed as well and pertinent positives noted).       Cardiac Diagnostics      Echocardiogram 4 February 2021:  Normal left ventricular size with low normal left ventricular systolic performance.  Ejection fraction 50%.  Moderate tricuspid insufficiency.  Moderate right ventricular enlargement with mildly reduced right ventricular systolic performance.  Severe bi-atrial enlargement.    Echocardiogram 14 August 2015:  Normal left ventricular size and systolic performance with ejection fraction of 50 to 55%.  Mild to moderate tricuspid insufficiency.  Normal right ventricular size with low normal right ventricular systolic performance.  Normal left atrial dimension.  Mild right atrial enlargement.    Device interrogation 31 March 2025 (Medtronic [id number] Emerita XT DR MRI device implanted 8 February 2021):  Encounter Type: Alert remote pacemaker transmission for AT/AF >/=6hrs. Courtesy check.   Device: Medtronic Gearhart.   Pacing % /Programmed: AP 96%,  <1% at AAIR<=>DDDR 60/130 ppm.   Lead(s): stable.   Battery longevity: 9yrs, 9mo estimated.   Presenting: AP-VS 60 bpm.   Atrial high rates: since 2/19/25; 9 mode switch episodes, all occurring on 3/28/25, total of 19 hrs, AF burden 2.1%, v-rates >/=120bpm 5%.   Anticoagulant: Apixaban.   Ventricular High rates: since 19 February 2025; None detected.   Comments: Normal device function.   Plan: Routed to device RN for review. NESSA Cantu Device Specialist    Device interrogation 20 April 2022 (Medtronic [id number] Emerita XT DR MRI device implanted 8 February 2021):  Type:  routine remote pacemaker transmission.  Presenting rhythm: AP-VS 62 bpm.  Battery/lead status: stable.  Arrhythmias: since 5 January 2022; 220 mode switch episodes, longest lasting 5hrs, available EGMs appear to be AF, v-rates >/=120bpm ~65%, AF burden 4.9%. 44 ventricular high rate episodes and 8 fast A&V episodes,  Anticoagulant: Apixaban  Comments: normal pacemaker function. Routed to device RN for review. NESSA Cantu, Device Specialist.   ADD: Low AF burden, but RVR noted with A.fib. Asymptomatic. Overdue for OV with Gen Card. Message forwarded to scheduling to set up annual device check in tandem with Cardiologist.      Twelve-lead ECG (personally reviewed) 3 February 2021: Atrial fibrillation with heart rate of 165 bpm.  Right bundle branch block with associated T wave changes.           Physical Examination       BP (!) 159/85 (BP Location: Left arm, Patient Position: Sitting, Cuff Size: Adult Regular)   Pulse 92   Wt 75.3 kg (166 lb)   SpO2 97%   BMI 25.62 kg/m          Wt Readings from Last 3 Encounters:   04/14/25 75.3 kg (166 lb)   04/29/24 70.8 kg (156 lb)   04/01/24 73.5 kg (162 lb)       The patient is alert and oriented times three. Sclerae are anicteric. Mucosal membranes are moist. Jugular venous pressure is normal. No significant adenopathy/thyromegally appreciated. Lungs are diminished, but clear with reasonable expansion. On cardiovascular exam, the patient has a early regular S1 and S2.  2/6 systolic murmur.  Abdomen is soft and non-tender. Extremities reveal no clubbing, cyanosis, or edema.       Family History/Social History/Risk Factors   Patient does not smoke.  Family history reviewed.         Medical History  Surgical History Family History Social History   Past Medical History:   Diagnosis Date    Arthritis     bilateral hands    Atrial fibrillation (H)     Atrial flutter (H)     Cancer (H)     skin cancer    Gout     Hypertension      Past Surgical History:   Procedure Laterality Date     BIOPSY SKIN (LOCATION)      to remove skin cancer    CIRCUMCISION      EP PACEMAKER INSERT N/A 2/8/2021    Procedure: EP Pacemaker Insertion;  Surgeon: Bautista Umana MD;  Location: [facility];  Service: Cardiology    HERNIA REPAIR Right 1988    inquinal hernia    TONSILLECTOMY       No family history on file.     Social History     Socioeconomic History    Marital status:      Spouse name: Not on file    Number of children: Not on file    Years of education: Not on file    Highest education level: Not on file   Occupational History    Not on file   Tobacco Use    Smoking status: Never    Smokeless tobacco: Never   Substance and Sexual Activity    Alcohol use: No     Comment: Alcoholic Drinks/day: very little    Drug use: No    Sexual activity: Not on file   Other Topics Concern    Not on file   Social History Narrative    Not on file     Social Drivers of Health     Financial Resource Strain: High Risk (1/1/2022)    Received from [facility], [facility]    Financial Resource Strain     Difficulty of Paying Living Expenses: Not on file     Difficulty of Paying Living Expenses: Not on file   Food Insecurity: No Food Insecurity (5/23/2024)    Received from [facility]    Hunger Vital Sign     Worried About Running Out of Food in the Last Year: Never true     Ran Out of Food in the Last Year: Never true   Transportation Needs: No Transportation Needs (5/23/2024)    Received from [facility]    PRAPARE - Transportation     Lack of Transportation (Medical): No     Lack of Transportation (Non-Medical): No   Physical Activity: Not on file   Stress: Not on file   Social Connections: Unknown (1/1/2022)    Received from [facility], [facility]    Social Connections     Frequency of Communication  with Friends and Family: Not on file   Interpersonal Safety: Not At Risk (5/22/2024)    Received from [facility]    EH IP Custom IPV     Do you feel UNSAFE in any of your personal relationships with your family members or any other acquaintances?: No   Housing Stability: Low Risk  (5/23/2024)    Received from [facility]    Housing Stability Vital Sign     Unable to Pay for Housing in the Last Year: No     Number of Times Moved in the Last Year: 0     Homeless in the Last Year: No           Medications  Allergies   Current Outpatient Medications   Medication Sig Dispense Refill    amiodarone (PACERONE) 200 MG tablet TAKE 1 TAB ONCE DAILY 90 tablet 3    apixaban ANTICOAGULANT (ELIQUIS ANTICOAGULANT) 5 MG tablet TAKE ONE TABLET BY MOUTH TWICE DAILY 180 tablet 3    arginine HCl, L-arginine, 500 mg cap [ARGININE HCL, L-ARGININE, 500 MG CAP] Take 500 mg by mouth 2 (two) times a day.       diltiazem ER (DILT-XR) 180 MG 24 hr capsule TAKE 180 MG CAPSULE BY MOUTH WITH  MG FOR A TOTAL DAILY DOSE  MG ONCE DAILY. 90 capsule 0    diltiazem ER (DILT-XR) 240 MG 24 hr ER beaded capsule TAKE 240 MG CAp BY MOUTH WITH  MG FOR TOTAL DAILY DOSE  MG daily 90 capsule 3    DOCOSAHEXANOIC ACID/EPA (FISH OIL ORAL) Take 500 mg by mouth 3 times daily      grape seed extract (GRAPE SEED ORAL) [GRAPE SEED EXTRACT (GRAPE SEED ORAL)] Take 1 tablet by mouth daily.      lactase (LACTAID) 3,000 unit tablet [LACTASE (LACTAID) 3,000 UNIT TABLET] Take 1 tablet (3,000 Units total) by mouth 3 (three) times a day with meals.  0    levOCARNitine (L-CARNITINE) 500 mg Tab [LEVOCARNITINE (L-CARNITINE) 500 MG TAB] Take 500 mg by mouth daily.      metoprolol tartrate (LOPRESSOR) 50 MG tablet Take 1.5 tablets (75 mg) by mouth 2 times daily 270 tablet 3    multivitamin with minerals (THERA-M) 9 mg iron-400 mcg Tab tablet [MULTIVITAMIN WITH MINERALS (THERA-M) 9 MG  "IRON-400 MCG TAB TABLET] Take 1 tablet by mouth daily.      UNABLE TO FIND Take 3 tablets by mouth daily. Grape seed extract      furosemide (LASIX) 40 MG tablet [FUROSEMIDE (LASIX) 40 MG TABLET] Take 1 tablet (40 mg total) by mouth daily. 30 tablet 0       Allergies   Allergen Reactions    Simvastatin      Other reaction(s): muscle aches          Lab Results    Chemistry/lipid CBC Cardiac Enzymes/BNP/TSH/INR   Recent Labs   Lab Test 04/18/22  0926   CHOL 225*   HDL 53   *   TRIG 169*     Recent Labs   Lab Test 04/18/22  0926 04/11/19  1010 03/29/18  1027   * 135* 102     Recent Labs   Lab Test 04/01/24  1503      POTASSIUM 4.4   CHLORIDE 102   CO2 32*   *   BUN 16.0   CR 1.06   GFRESTIMATED 67   DARCY 9.8     Recent Labs   Lab Test 04/01/24  1503 04/18/22  0926 02/18/21  1108   CR 1.06 0.87 0.88     No results for input(s): \"A1C\" in the last 76739 hours.       Recent Labs   Lab Test 02/09/21  0444 02/07/21  0429   WBC  --  8.8   HGB  --  13.4*   HCT  --  39.9*   MCV  --  94    155     Recent Labs   Lab Test 02/07/21  0429 02/05/21  2241 02/05/21  0339   HGB 13.4* 13.2* 13.1*    Recent Labs   Lab Test 02/05/21  2241 02/05/21  0339 02/03/21  1303   TROPONINI 0.03 0.04 0.04     Recent Labs   Lab Test 02/03/21  1303   *     Recent Labs   Lab Test 04/01/24  1503   TSH 1.51     Recent Labs   Lab Test 02/04/21  0530   INR 1.33*          Medications  Allergies   Current Outpatient Medications   Medication Sig Dispense Refill    amiodarone (PACERONE) 200 MG tablet TAKE 1 TAB ONCE DAILY 90 tablet 3    apixaban ANTICOAGULANT (ELIQUIS ANTICOAGULANT) 5 MG tablet TAKE ONE TABLET BY MOUTH TWICE DAILY 180 tablet 3    arginine HCl, L-arginine, 500 mg cap [ARGININE HCL, L-ARGININE, 500 MG CAP] Take 500 mg by mouth 2 (two) times a day.       diltiazem ER (DILT-XR) 180 MG 24 hr capsule TAKE 180 MG CAPSULE BY MOUTH WITH  MG FOR A TOTAL DAILY DOSE  MG ONCE DAILY. 90 capsule 0    " diltiazem ER (DILT-XR) 240 MG 24 hr ER beaded capsule TAKE 240 MG CAp BY MOUTH WITH  MG FOR TOTAL DAILY DOSE  MG daily 90 capsule 3    DOCOSAHEXANOIC ACID/EPA (FISH OIL ORAL) Take 500 mg by mouth 3 times daily      grape seed extract (GRAPE SEED ORAL) [GRAPE SEED EXTRACT (GRAPE SEED ORAL)] Take 1 tablet by mouth daily.      lactase (LACTAID) 3,000 unit tablet [LACTASE (LACTAID) 3,000 UNIT TABLET] Take 1 tablet (3,000 Units total) by mouth 3 (three) times a day with meals.  0    levOCARNitine (L-CARNITINE) 500 mg Tab [LEVOCARNITINE (L-CARNITINE) 500 MG TAB] Take 500 mg by mouth daily.      metoprolol tartrate (LOPRESSOR) 50 MG tablet Take 1.5 tablets (75 mg) by mouth 2 times daily 270 tablet 3    multivitamin with minerals (THERA-M) 9 mg iron-400 mcg Tab tablet [MULTIVITAMIN WITH MINERALS (THERA-M) 9 MG IRON-400 MCG TAB TABLET] Take 1 tablet by mouth daily.      UNABLE TO FIND Take 3 tablets by mouth daily. Grape seed extract      furosemide (LASIX) 40 MG tablet [FUROSEMIDE (LASIX) 40 MG TABLET] Take 1 tablet (40 mg total) by mouth daily. 30 tablet 0      Allergies   Allergen Reactions    Simvastatin      Other reaction(s): muscle aches          Lab Results   Lab Results   Component Value Date     04/01/2024    CO2 32 04/01/2024    CO2 31 04/18/2022    BUN 16.0 04/01/2024    BUN 13 04/18/2022     Lab Results   Component Value Date    WBC 8.8 02/07/2021    HGB 13.4 02/07/2021    HCT 39.9 02/07/2021    MCV 94 02/07/2021     02/09/2021     Lab Results   Component Value Date    CHOL 225 04/18/2022    TRIG 169 04/18/2022    HDL 53 04/18/2022     Lab Results   Component Value Date    INR 1.33 02/04/2021     Lab Results   Component Value Date     02/03/2021     Lab Results   Component Value Date    TROPONINI 0.03 02/05/2021    TROPONINI 0.04 02/05/2021    TROPONINI 0.04 02/03/2021     Lab Results   Component Value Date    TSH 1.51 04/01/2024    TSH 1.71 02/03/2021

## 2025-04-14 NOTE — LETTER
4/14/2025    Physician No Ref-Primary  No address on file    RE: Daniel MYKE Sawant       Dear Colleague,     I had the pleasure of seeing Daniel Sawant in the [facility].         [facility] [address], [place] [address]   [email or web address]   OFFICE: [phone]          Impression and Plan     1. Atrial fibrillation (persistent). Patient has history of atrial fibrillation/flutter. This was initially diagnosed in 2015.      Daniel underwent permanent pacemaker placement 8 February 2021.     Daniel was initiated on amiodarone therapy in February 2023 his recent device interrogation revealed a fairly low atrial fibrillation burden (12.1%).  Ventricular response well-controlled when in atrial fibrillation.      Daniel has had some issues with instability (he uses a walker) and has had a couple of prior falls though none recently.  I feel he would be a good candidate for left atrial appendage occlusion device placement.  I discussed this with Daniel and at first blush he seemed quite interested in pursuing this.  Plan:  Continue diltiazem 240 mg mg daily.  Continue metoprolol 75 mg twice daily.  Continue amiodarone 200 mg daily.  Continue apixaban 5 mg twice daily.  Will refer to our coordinator for consideration of left atrial appendage occlusion device placement.     2.  Hypertension.  Blood pressure is reasonable in the office today at 122/70 mmHg.    3.  Systolic murmur/tricuspid insufficiency..  Daniel is noted to have a softer systolic murmur heard somewhat in a/like distribution.  Last echocardiogram from 2021 revealed moderate tricuspid insufficiency.  Will obtain repeat echocardiogram for surveillance purposes.    Follow-up with myself to be determined.  Daniel will be followed through the Device Clinic  & Atrial Fibrillation Clinic per protocol as well.    35 minutes spent reviewing prior records (including documentation, laboratory  "studies, cardiac testing/imaging), interview with patient along with physical exam, planning, and subsequent documentation/crafting of note).     The longitudinal plan of care for atrial fibrillation & hypertension was addressed during this visit.?Due to the added complexity in care, I will continue to support Daniel Sawant in the subsequent management of this condition(s) and with the ongoing continuity of care of this condition(s)\".         Shared Decision Making     Daniel has documented nonvalvular atrial fibrillation (NVAF) and is currently on oral anticoagulant therapy apixaban. [id number] = 3 (age of 89, & HTN) HAS-BLED risk score: 2 (age of 89 and bleeding issues). Indication(s) to consider non-oral anticoagulation therapy: predisposition to bleeding, instability, and faling.  Daniel has no contra-indication to come off oral anti-coagulant therapy if LAAC device is successfully placed.      I have personally reviewed Daniel 's chart and discussed the following with Daniel family; 1) The choices available for reducing stroke risk from atrial fibrillation, 2) Treatment options available including respective risk/benefits, and 3) What factors are most important for the patient in making their decision.  The [facility] shared decision making https://www.cardiosmart.org/SDM/Decision-Aids/Find-Decision-Aids/Atrial-Fibrillation was used to guide this conversation.   Daniel was counseled that their decision could be made at this time or in the future if more time was needed to consider their decision.      Daniel is felt to be an appropriate candidate to proceed with left atrial appendage screening and implant.         History of Present Illness    Once again I would like to thank you again for asking me to participate in the care of your patient, Daniel Sawant.  As you know, but to reiterate for my own records, Daniel Sawant is a 89 year old male with history of persistent atrial fibrillation. This was initially " diagnosed in 2015. Daniel at one point he had been on sotalol therapy a few years ago though this was discontinued after approximate 6-month duration due to tendency toward bradycardia.       Daniel underwent permanent pacemaker placement 8 February 2021.     On interview, Daniel is without cardiovascular complaint. Despite intermittent episodes of atrial fibrillation on device interrogations, he has had no symptoms with these episodes.  He denies chest pain and shortness of breath.    Further review of systems is otherwise negative/noncontributory (medical record and 13 point review of systems reviewed as well and pertinent positives noted).       Cardiac Diagnostics      Echocardiogram 4 February 2021:  Normal left ventricular size with low normal left ventricular systolic performance.  Ejection fraction 50%.  Moderate tricuspid insufficiency.  Moderate right ventricular enlargement with mildly reduced right ventricular systolic performance.  Severe bi-atrial enlargement.    Echocardiogram 14 August 2015:  Normal left ventricular size and systolic performance with ejection fraction of 50 to 55%.  Mild to moderate tricuspid insufficiency.  Normal right ventricular size with low normal right ventricular systolic performance.  Normal left atrial dimension.  Mild right atrial enlargement.    Device interrogation 31 March 2025 (Medtronic [id number] [place] XT DR MRI device implanted 8 February 2021):  Encounter Type: Alert remote pacemaker transmission for AT/AF >/=6hrs. Courtesy check.   Device: Medtronic Emerita.   Pacing % /Programmed: AP 96%,  <1% at AAIR<=>DDDR 60/130 ppm.   Lead(s): stable.   Battery longevity: 9yrs, 9mo estimated.   Presenting: AP-VS 60 bpm.   Atrial high rates: since 2/19/25; 9 mode switch episodes, all occurring on 3/28/25, total of 19 hrs, AF burden 2.1%, v-rates >/=120bpm 5%.   Anticoagulant: Apixaban.   Ventricular High rates: since 19 February 2025; None detected.   Comments: Normal device  function.   Plan: Routed to device RN for review. NESSA Cantu, Device Specialist    Device interrogation 20 April 2022 (Medtronic [id number] [place] XT DR MRI device implanted 8 February 2021):  Type: routine remote pacemaker transmission.  Presenting rhythm: AP-VS 62 bpm.  Battery/lead status: stable.  Arrhythmias: since 5 January 2022; 220 mode switch episodes, longest lasting 5hrs, available EGMs appear to be AF, v-rates >/=120bpm ~65%, AF burden 4.9%. 44 ventricular high rate episodes and 8 fast A&V episodes,  Anticoagulant: Apixaban  Comments: normal pacemaker function. Routed to device RN for review. NESSA Cantu, Device Specialist.   ADD: Low AF burden, but RVR noted with A.fib. Asymptomatic. Overdue for OV with Gen Card. Message forwarded to scheduling to set up annual device check in tandem with Cardiologist.      Twelve-lead ECG (personally reviewed) 3 February 2021: Atrial fibrillation with heart rate of 165 bpm.  Right bundle branch block with associated T wave changes.           Physical Examination       BP (!) 159/85 (BP Location: Left arm, Patient Position: Sitting, Cuff Size: Adult Regular)   Pulse 92   Wt 75.3 kg (166 lb)   SpO2 97%   BMI 25.62 kg/m          Wt Readings from Last 3 Encounters:   04/14/25 75.3 kg (166 lb)   04/29/24 70.8 kg (156 lb)   04/01/24 73.5 kg (162 lb)       The patient is alert and oriented times three. Sclerae are anicteric. Mucosal membranes are moist. Jugular venous pressure is normal. No significant adenopathy/thyromegally appreciated. Lungs are diminished, but clear with reasonable expansion. On cardiovascular exam, the patient has a early regular S1 and S2.  2/6 systolic murmur.  Abdomen is soft and non-tender. Extremities reveal no clubbing, cyanosis, or edema.       Family History/Social History/Risk Factors   Patient does not smoke.  Family history reviewed.         Medical History  Surgical History Family History Social History   Past Medical History:   Diagnosis Date      Arthritis     bilateral hands     Atrial fibrillation (H)      Atrial flutter (H)      Cancer (H)     skin cancer     Gout      Hypertension      Past Surgical History:   Procedure Laterality Date     BIOPSY SKIN (LOCATION)      to remove skin cancer     CIRCUMCISION       EP PACEMAKER INSERT N/A 2/8/2021    Procedure: EP Pacemaker Insertion;  Surgeon: Bautista Umana MD;  Location: [facility];  Service: Cardiology     HERNIA REPAIR Right 1988    inquinal hernia     TONSILLECTOMY       No family history on file.     Social History     Socioeconomic History     Marital status:      Spouse name: Not on file     Number of children: Not on file     Years of education: Not on file     Highest education level: Not on file   Occupational History     Not on file   Tobacco Use     Smoking status: Never     Smokeless tobacco: Never   Substance and Sexual Activity     Alcohol use: No     Comment: Alcoholic Drinks/day: very little     Drug use: No     Sexual activity: Not on file   Other Topics Concern     Not on file   Social History Narrative     Not on file     Social Drivers of Health     Financial Resource Strain: High Risk (1/1/2022)    Received from [facility] [employer] [facility], [facility] [employer] [facility]    Financial Resource Strain      Difficulty of Paying Living Expenses: Not on file      Difficulty of Paying Living Expenses: Not on file   Food Insecurity: No Food Insecurity (5/23/2024)    Received from [facility] and [facility]    Hunger Vital Sign      Worried About Running Out of Food in the Last Year: Never true      Ran Out of Food in the Last Year: Never true   Transportation Needs: No Transportation Needs (5/23/2024)    Received from [facility] and [facility]    PRAPARE - Transportation      Lack of Transportation (Medical): No      Lack of Transportation (Non-Medical): No   Physical Activity: Not on file    Stress: Not on file   Social Connections: Unknown (1/1/2022)    Received from [facility] [employer] [facility], [facility] [employer] [facility]    Social Connections      Frequency of Communication with Friends and Family: Not on file   Interpersonal Safety: Not At Risk (5/22/2024)    Received from [facility] [employer]      Do you feel UNSAFE in any of your personal relationships with your family members or any other acquaintances?: No   Housing Stability: Low Risk  (5/23/2024)    Received from [facility]    Housing Stability Vital Sign      Unable to Pay for Housing in the Last Year: No      Number of Times Moved in the Last Year: 0      Homeless in the Last Year: No           Medications  Allergies   Current Outpatient Medications   Medication Sig Dispense Refill     amiodarone (PACERONE) 200 MG tablet TAKE 1 TAB ONCE DAILY 90 tablet 3     apixaban ANTICOAGULANT (ELIQUIS ANTICOAGULANT) 5 MG tablet TAKE ONE TABLET BY MOUTH TWICE DAILY 180 tablet 3     arginine HCl, L-arginine, 500 mg cap [ARGININE HCL, L-ARGININE, 500 MG CAP] Take 500 mg by mouth 2 (two) times a day.        diltiazem ER (DILT-XR) 180 MG 24 hr capsule TAKE 180 MG CAPSULE BY MOUTH WITH  MG FOR A TOTAL DAILY DOSE  MG ONCE DAILY. 90 capsule 0     diltiazem ER (DILT-XR) 240 MG 24 hr ER beaded capsule TAKE 240 MG CAp BY MOUTH WITH  MG FOR TOTAL DAILY DOSE  MG daily 90 capsule 3     DOCOSAHEXANOIC ACID/EPA (FISH OIL ORAL) Take 500 mg by mouth 3 times daily       grape seed extract (GRAPE SEED ORAL) [GRAPE SEED EXTRACT (GRAPE SEED ORAL)] Take 1 tablet by mouth daily.       lactase (LACTAID) 3,000 unit tablet [LACTASE (LACTAID) 3,000 UNIT TABLET] Take 1 tablet (3,000 Units total) by mouth 3 (three) times a day with meals.  0     levOCARNitine (L-CARNITINE) 500 mg Tab [LEVOCARNITINE (L-CARNITINE) 500 MG TAB] Take 500 mg by  "mouth daily.       metoprolol tartrate (LOPRESSOR) 50 MG tablet Take 1.5 tablets (75 mg) by mouth 2 times daily 270 tablet 3     multivitamin with minerals (THERA-M) 9 mg iron-400 mcg Tab tablet [MULTIVITAMIN WITH MINERALS (THERA-M) 9 MG IRON-400 MCG TAB TABLET] Take 1 tablet by mouth daily.       UNABLE TO FIND Take 3 tablets by mouth daily. Grape seed extract       furosemide (LASIX) 40 MG tablet [FUROSEMIDE (LASIX) 40 MG TABLET] Take 1 tablet (40 mg total) by mouth daily. 30 tablet 0       Allergies   Allergen Reactions     Simvastatin      Other reaction(s): muscle aches          Lab Results    Chemistry/lipid CBC Cardiac Enzymes/BNP/TSH/INR   Recent Labs   Lab Test 04/18/22  0926   CHOL 225*   HDL 53   *   TRIG 169*     Recent Labs   Lab Test 04/18/22  0926 04/11/19  1010 03/29/18  1027   * 135* 102     Recent Labs   Lab Test 04/01/24  1503      POTASSIUM 4.4   CHLORIDE 102   CO2 32*   *   BUN 16.0   CR 1.06   GFRESTIMATED 67   DARCY 9.8     Recent Labs   Lab Test 04/01/24  1503 04/18/22  0926 02/18/21  1108   CR 1.06 0.87 0.88     No results for input(s): \"A1C\" in the last 54026 hours.       Recent Labs   Lab Test 02/09/21  0444 02/07/21  0429   WBC  --  8.8   HGB  --  13.4*   HCT  --  39.9*   MCV  --  94    155     Recent Labs   Lab Test 02/07/21  0429 02/05/21  2241 02/05/21  0339   HGB 13.4* 13.2* 13.1*    Recent Labs   Lab Test 02/05/21  2241 02/05/21  0339 02/03/21  1303   TROPONINI 0.03 0.04 0.04     Recent Labs   Lab Test 02/03/21  1303   *     Recent Labs   Lab Test 04/01/24  1503   TSH 1.51     Recent Labs   Lab Test 02/04/21  0530   INR 1.33*          Medications  Allergies   Current Outpatient Medications   Medication Sig Dispense Refill     amiodarone (PACERONE) 200 MG tablet TAKE 1 TAB ONCE DAILY 90 tablet 3     apixaban ANTICOAGULANT (ELIQUIS ANTICOAGULANT) 5 MG tablet TAKE ONE TABLET BY MOUTH TWICE DAILY 180 tablet 3     arginine HCl, L-arginine, 500 mg cap " [ARGININE HCL, L-ARGININE, 500 MG CAP] Take 500 mg by mouth 2 (two) times a day.        diltiazem ER (DILT-XR) 180 MG 24 hr capsule TAKE 180 MG CAPSULE BY MOUTH WITH  MG FOR A TOTAL DAILY DOSE  MG ONCE DAILY. 90 capsule 0     diltiazem ER (DILT-XR) 240 MG 24 hr ER beaded capsule TAKE 240 MG CAp BY MOUTH WITH  MG FOR TOTAL DAILY DOSE  MG daily 90 capsule 3     DOCOSAHEXANOIC ACID/EPA (FISH OIL ORAL) Take 500 mg by mouth 3 times daily       grape seed extract (GRAPE SEED ORAL) [GRAPE SEED EXTRACT (GRAPE SEED ORAL)] Take 1 tablet by mouth daily.       lactase (LACTAID) 3,000 unit tablet [LACTASE (LACTAID) 3,000 UNIT TABLET] Take 1 tablet (3,000 Units total) by mouth 3 (three) times a day with meals.  0     levOCARNitine (L-CARNITINE) 500 mg Tab [LEVOCARNITINE (L-CARNITINE) 500 MG TAB] Take 500 mg by mouth daily.       metoprolol tartrate (LOPRESSOR) 50 MG tablet Take 1.5 tablets (75 mg) by mouth 2 times daily 270 tablet 3     multivitamin with minerals (THERA-M) 9 mg iron-400 mcg Tab tablet [MULTIVITAMIN WITH MINERALS (THERA-M) 9 MG IRON-400 MCG TAB TABLET] Take 1 tablet by mouth daily.       UNABLE TO FIND Take 3 tablets by mouth daily. Grape seed extract       furosemide (LASIX) 40 MG tablet [FUROSEMIDE (LASIX) 40 MG TABLET] Take 1 tablet (40 mg total) by mouth daily. 30 tablet 0      Allergies   Allergen Reactions     Simvastatin      Other reaction(s): muscle aches          Lab Results   Lab Results   Component Value Date     04/01/2024    CO2 32 04/01/2024    CO2 31 04/18/2022    BUN 16.0 04/01/2024    BUN 13 04/18/2022     Lab Results   Component Value Date    WBC 8.8 02/07/2021    HGB 13.4 02/07/2021    HCT 39.9 02/07/2021    MCV 94 02/07/2021     02/09/2021     Lab Results   Component Value Date    CHOL 225 04/18/2022    TRIG 169 04/18/2022    HDL 53 04/18/2022     Lab Results   Component Value Date    INR 1.33 02/04/2021     Lab Results   Component Value Date      02/03/2021     Lab Results   Component Value Date    TROPONINI 0.03 02/05/2021    TROPONINI 0.04 02/05/2021    TROPONINI 0.04 02/03/2021     Lab Results   Component Value Date    TSH 1.51 04/01/2024    TSH 1.71 02/03/2021                      Thank you for allowing me to participate in the care of your patient.      Sincerely,     Esperanza Castillo MD     [facility]  cc:   Esperanza Castillo MD  [address] [place] [address]

## 2025-05-02 ENCOUNTER — HOSPITAL ENCOUNTER (OUTPATIENT)
Dept: CARDIOLOGY | Facility: CLINIC | Age: OVER 89
Discharge: HOME | End: 2025-05-02
Attending: INTERNAL MEDICINE | Admitting: INTERNAL MEDICINE
Payer: COMMERCIAL

## 2025-05-02 DIAGNOSIS — I48.0 PAROXYSMAL ATRIAL FIBRILLATION (H): ICD-10-CM

## 2025-05-02 PROCEDURE — 255N000002 HC RX 255 OP 636: Performed by: INTERNAL MEDICINE

## 2025-05-02 PROCEDURE — 93306 TTE W/DOPPLER COMPLETE: CPT | Mod: 26 | Performed by: INTERNAL MEDICINE

## 2025-05-02 RX ADMIN — PERFLUTREN 3 ML: 6.52 INJECTION, SUSPENSION INTRAVENOUS at 11:30

## 2025-05-04 DIAGNOSIS — I48.0 PAROXYSMAL ATRIAL FIBRILLATION (H): ICD-10-CM

## 2025-05-05 RX ORDER — DILTIAZEM HYDROCHLORIDE 180 MG/1
CAPSULE, EXTENDED RELEASE ORAL
Qty: 90 CAPSULE | Refills: 2 | Status: SHIPPED | OUTPATIENT
Start: 2025-05-05

## 2025-05-07 ENCOUNTER — RESULTS FOLLOW-UP (OUTPATIENT)
Dept: CARDIOLOGY | Facility: CLINIC | Age: OVER 89
End: 2025-05-07

## 2025-05-07 NOTE — TELEPHONE ENCOUNTER
----- Message from Hailey KAHN sent at 5/7/2025 10:25 AM CDT -----  Regarding: [facility] referral  Just spoke to pt carlos echo results - he wants to proceed with consult and will be in and out of his apt today - please call and leave direct call back # if msg has to be left - Thanks  mg

## 2025-05-14 ENCOUNTER — TELEPHONE (OUTPATIENT)
Dept: CARDIOLOGY | Facility: CLINIC | Age: OVER 89
End: 2025-05-14
Payer: COMMERCIAL

## 2025-05-14 NOTE — TELEPHONE ENCOUNTER
[facility]    Phone Message    May a detailed message be left on voicemail: yes     Reason for Call: Patient called stated he missed a call from the clinic in regards his Echo results. Please review and call patient back .     Action Taken: Other: Cardio     Travel Screening: Not Applicable     Date of Service:        Thank you!  [facility]

## 2025-05-14 NOTE — TELEPHONE ENCOUNTER
Noted per 5/7/25 Results Follow-up note, Echo results were reviewed and message was sent to LAAO team to arrange follow-up with pt.     Return phone call to pt. Informed him appears results were previously reviewed 5/7/25. Reviewed Echo results again along with recommendations. Pt stated he has not heard back yet from L[facility]O team for arranging consult. Reassured pt will follow-up with L[facility]O team to have them reach out to arrange consult as ordered. Understanding verbalized. LMS

## 2025-05-15 ENCOUNTER — TELEPHONE (OUTPATIENT)
Dept: CARDIOLOGY | Facility: CLINIC | Age: OVER 89
End: 2025-05-15
Payer: COMMERCIAL

## 2025-05-15 DIAGNOSIS — I48.0 PAROXYSMAL ATRIAL FIBRILLATION (H): Primary | ICD-10-CM

## 2025-05-15 NOTE — TELEPHONE ENCOUNTER
Structural Heart Clinic L[facility]O REFERRAL    Referral received from: Dr. Castillo  Reason for referral: gait instability, fall hx, easy bruising/bleeding  Current anticoagulation: Eliquis    Imaging: CTA ordered  Contrast allergy: No  Contacted patient to introduce self, provide education on LAAO.  Arranged consult with Dr. Santoro  Provided direct contact information.     Kamla Iqbal RN  Structural Heart Coordinator  [facility]

## 2025-05-15 NOTE — TELEPHONE ENCOUNTER
"Spoke to pt re recommendation for LAAC.    Pt is not sure why we're calling him, is asking if this is something Dr Castillo is recommending, and does not want to have procedure at [facility] (proceeded to tell me what a \"horrible\" facility it is).    Offered to send his information to our team in [place] to have procedure at [facility], and pt said he would appreciate that.    ----- Message from Ines Bhagat RN sent at 5/14/2025  4:19 PM CDT -----    Dr. Castillo placed consult for this pt for LAAO device. Patient is ready and wishing to schedule. Can you please follow-up with patient?     "

## 2025-06-02 ENCOUNTER — ANCILLARY PROCEDURE (OUTPATIENT)
Dept: CARDIOLOGY | Facility: CLINIC | Age: OVER 89
End: 2025-06-02
Attending: INTERNAL MEDICINE
Payer: COMMERCIAL

## 2025-06-02 DIAGNOSIS — I48.0 PAROXYSMAL ATRIAL FIBRILLATION (H): ICD-10-CM

## 2025-06-02 DIAGNOSIS — Z95.0 PACEMAKER: ICD-10-CM

## 2025-06-02 DIAGNOSIS — I49.5 SICK SINUS SYNDROME (H): ICD-10-CM

## 2025-06-02 LAB
MDC_IDC_EPISODE_DTM: NORMAL
MDC_IDC_EPISODE_DURATION: 1051 S
MDC_IDC_EPISODE_DURATION: 3717 S
MDC_IDC_EPISODE_DURATION: 518 S
MDC_IDC_EPISODE_DURATION: 7869 S
MDC_IDC_EPISODE_DURATION: 8948 S
MDC_IDC_EPISODE_DURATION: NORMAL S
MDC_IDC_EPISODE_DURATION: NORMAL S
MDC_IDC_EPISODE_ID: 5992
MDC_IDC_EPISODE_ID: 5993
MDC_IDC_EPISODE_ID: 5994
MDC_IDC_EPISODE_ID: 5995
MDC_IDC_EPISODE_ID: 5996
MDC_IDC_EPISODE_ID: 5997
MDC_IDC_EPISODE_ID: 5998
MDC_IDC_EPISODE_TYPE: NORMAL
MDC_IDC_LEAD_CONNECTION_STATUS: NORMAL
MDC_IDC_LEAD_CONNECTION_STATUS: NORMAL
MDC_IDC_LEAD_IMPLANT_DT: NORMAL
MDC_IDC_LEAD_IMPLANT_DT: NORMAL
MDC_IDC_LEAD_LOCATION: NORMAL
MDC_IDC_LEAD_LOCATION: NORMAL
MDC_IDC_LEAD_LOCATION_DETAIL_1: NORMAL
MDC_IDC_LEAD_LOCATION_DETAIL_1: NORMAL
MDC_IDC_LEAD_MFG: NORMAL
MDC_IDC_LEAD_MFG: NORMAL
MDC_IDC_LEAD_MODEL: NORMAL
MDC_IDC_LEAD_MODEL: NORMAL
MDC_IDC_LEAD_POLARITY_TYPE: NORMAL
MDC_IDC_LEAD_POLARITY_TYPE: NORMAL
MDC_IDC_LEAD_SERIAL: NORMAL
MDC_IDC_LEAD_SERIAL: NORMAL
MDC_IDC_LEAD_SPECIAL_FUNCTION: 69
MDC_IDC_MSMT_BATTERY_DTM: NORMAL
MDC_IDC_MSMT_BATTERY_REMAINING_LONGEVITY: 114 MO
MDC_IDC_MSMT_BATTERY_RRT_TRIGGER: 2.62
MDC_IDC_MSMT_BATTERY_STATUS: NORMAL
MDC_IDC_MSMT_BATTERY_VOLTAGE: 3 V
MDC_IDC_MSMT_LEADCHNL_RA_IMPEDANCE_VALUE: 285 OHM
MDC_IDC_MSMT_LEADCHNL_RA_IMPEDANCE_VALUE: 380 OHM
MDC_IDC_MSMT_LEADCHNL_RA_PACING_THRESHOLD_AMPLITUDE: 0.62 V
MDC_IDC_MSMT_LEADCHNL_RA_PACING_THRESHOLD_PULSEWIDTH: 0.4 MS
MDC_IDC_MSMT_LEADCHNL_RA_SENSING_INTR_AMPL: 0.5 MV
MDC_IDC_MSMT_LEADCHNL_RA_SENSING_INTR_AMPL: 0.5 MV
MDC_IDC_MSMT_LEADCHNL_RV_IMPEDANCE_VALUE: 380 OHM
MDC_IDC_MSMT_LEADCHNL_RV_IMPEDANCE_VALUE: 551 OHM
MDC_IDC_MSMT_LEADCHNL_RV_PACING_THRESHOLD_AMPLITUDE: 1.38 V
MDC_IDC_MSMT_LEADCHNL_RV_PACING_THRESHOLD_PULSEWIDTH: 0.4 MS
MDC_IDC_MSMT_LEADCHNL_RV_SENSING_INTR_AMPL: 16 MV
MDC_IDC_MSMT_LEADCHNL_RV_SENSING_INTR_AMPL: 16 MV
MDC_IDC_PG_IMPLANT_DTM: NORMAL
MDC_IDC_PG_MFG: NORMAL
MDC_IDC_PG_MODEL: NORMAL
MDC_IDC_PG_SERIAL: NORMAL
MDC_IDC_PG_TYPE: NORMAL
MDC_IDC_SESS_CLINIC_NAME: NORMAL
MDC_IDC_SESS_DTM: NORMAL
MDC_IDC_SESS_TYPE: NORMAL
MDC_IDC_SET_BRADY_AT_MODE_SWITCH_RATE: 171 {BEATS}/MIN
MDC_IDC_SET_BRADY_HYSTRATE: NORMAL
MDC_IDC_SET_BRADY_LOWRATE: 60 {BEATS}/MIN
MDC_IDC_SET_BRADY_MAX_SENSOR_RATE: 130 {BEATS}/MIN
MDC_IDC_SET_BRADY_MAX_TRACKING_RATE: 130 {BEATS}/MIN
MDC_IDC_SET_BRADY_MODE: NORMAL
MDC_IDC_SET_BRADY_PAV_DELAY_LOW: 180 MS
MDC_IDC_SET_BRADY_SAV_DELAY_LOW: 150 MS
MDC_IDC_SET_LEADCHNL_RA_PACING_AMPLITUDE: 1.5 V
MDC_IDC_SET_LEADCHNL_RA_PACING_ANODE_ELECTRODE_1: NORMAL
MDC_IDC_SET_LEADCHNL_RA_PACING_ANODE_LOCATION_1: NORMAL
MDC_IDC_SET_LEADCHNL_RA_PACING_CAPTURE_MODE: NORMAL
MDC_IDC_SET_LEADCHNL_RA_PACING_CATHODE_ELECTRODE_1: NORMAL
MDC_IDC_SET_LEADCHNL_RA_PACING_CATHODE_LOCATION_1: NORMAL
MDC_IDC_SET_LEADCHNL_RA_PACING_POLARITY: NORMAL
MDC_IDC_SET_LEADCHNL_RA_PACING_PULSEWIDTH: 0.4 MS
MDC_IDC_SET_LEADCHNL_RA_SENSING_ANODE_ELECTRODE_1: NORMAL
MDC_IDC_SET_LEADCHNL_RA_SENSING_ANODE_LOCATION_1: NORMAL
MDC_IDC_SET_LEADCHNL_RA_SENSING_CATHODE_ELECTRODE_1: NORMAL
MDC_IDC_SET_LEADCHNL_RA_SENSING_CATHODE_LOCATION_1: NORMAL
MDC_IDC_SET_LEADCHNL_RA_SENSING_POLARITY: NORMAL
MDC_IDC_SET_LEADCHNL_RA_SENSING_SENSITIVITY: 0.15 MV
MDC_IDC_SET_LEADCHNL_RV_PACING_AMPLITUDE: 2 V
MDC_IDC_SET_LEADCHNL_RV_PACING_ANODE_ELECTRODE_1: NORMAL
MDC_IDC_SET_LEADCHNL_RV_PACING_ANODE_LOCATION_1: NORMAL
MDC_IDC_SET_LEADCHNL_RV_PACING_CAPTURE_MODE: NORMAL
MDC_IDC_SET_LEADCHNL_RV_PACING_CATHODE_ELECTRODE_1: NORMAL
MDC_IDC_SET_LEADCHNL_RV_PACING_CATHODE_LOCATION_1: NORMAL
MDC_IDC_SET_LEADCHNL_RV_PACING_POLARITY: NORMAL
MDC_IDC_SET_LEADCHNL_RV_PACING_PULSEWIDTH: 0.4 MS
MDC_IDC_SET_LEADCHNL_RV_SENSING_ANODE_ELECTRODE_1: NORMAL
MDC_IDC_SET_LEADCHNL_RV_SENSING_ANODE_LOCATION_1: NORMAL
MDC_IDC_SET_LEADCHNL_RV_SENSING_CATHODE_ELECTRODE_1: NORMAL
MDC_IDC_SET_LEADCHNL_RV_SENSING_CATHODE_LOCATION_1: NORMAL
MDC_IDC_SET_LEADCHNL_RV_SENSING_POLARITY: NORMAL
MDC_IDC_SET_LEADCHNL_RV_SENSING_SENSITIVITY: 0.9 MV
MDC_IDC_SET_ZONE_DETECTION_INTERVAL: 200 MS
MDC_IDC_SET_ZONE_DETECTION_INTERVAL: 350 MS
MDC_IDC_SET_ZONE_DETECTION_INTERVAL: 400 MS
MDC_IDC_SET_ZONE_STATUS: NORMAL
MDC_IDC_SET_ZONE_TYPE: NORMAL
MDC_IDC_SET_ZONE_VENDOR_TYPE: NORMAL
MDC_IDC_STAT_AT_BURDEN_PERCENT: 1.4 %
MDC_IDC_STAT_AT_DTM_END: NORMAL
MDC_IDC_STAT_AT_DTM_START: NORMAL
MDC_IDC_STAT_BRADY_AP_VP_PERCENT: 0.04 %
MDC_IDC_STAT_BRADY_AP_VS_PERCENT: 99.68 %
MDC_IDC_STAT_BRADY_AS_VP_PERCENT: 0 %
MDC_IDC_STAT_BRADY_AS_VS_PERCENT: 0.29 %
MDC_IDC_STAT_BRADY_DTM_END: NORMAL
MDC_IDC_STAT_BRADY_DTM_START: NORMAL
MDC_IDC_STAT_BRADY_RA_PERCENT_PACED: 98.28 %
MDC_IDC_STAT_BRADY_RV_PERCENT_PACED: 0.1 %
MDC_IDC_STAT_EPISODE_RECENT_COUNT: 0
MDC_IDC_STAT_EPISODE_RECENT_COUNT: 7
MDC_IDC_STAT_EPISODE_RECENT_COUNT_DTM_END: NORMAL
MDC_IDC_STAT_EPISODE_RECENT_COUNT_DTM_START: NORMAL
MDC_IDC_STAT_EPISODE_TOTAL_COUNT: 0
MDC_IDC_STAT_EPISODE_TOTAL_COUNT: 1
MDC_IDC_STAT_EPISODE_TOTAL_COUNT: 249
MDC_IDC_STAT_EPISODE_TOTAL_COUNT: 5055
MDC_IDC_STAT_EPISODE_TOTAL_COUNT: 638
MDC_IDC_STAT_EPISODE_TOTAL_COUNT_DTM_END: NORMAL
MDC_IDC_STAT_EPISODE_TOTAL_COUNT_DTM_START: NORMAL
MDC_IDC_STAT_EPISODE_TYPE: NORMAL
MDC_IDC_STAT_TACHYTHERAPY_RECENT_DTM_END: NORMAL
MDC_IDC_STAT_TACHYTHERAPY_RECENT_DTM_START: NORMAL
MDC_IDC_STAT_TACHYTHERAPY_TOTAL_DTM_END: NORMAL
MDC_IDC_STAT_TACHYTHERAPY_TOTAL_DTM_START: NORMAL

## 2025-06-02 PROCEDURE — 93296 REM INTERROG EVL PM/IDS: CPT | Performed by: INTERNAL MEDICINE

## 2025-06-02 PROCEDURE — 93294 REM INTERROG EVL PM/LDLS PM: CPT | Performed by: INTERNAL MEDICINE

## 2025-06-18 ENCOUNTER — RESULTS FOLLOW-UP (OUTPATIENT)
Dept: CARDIOLOGY | Facility: CLINIC | Age: OVER 89
End: 2025-06-18

## 2025-06-18 ENCOUNTER — HOSPITAL ENCOUNTER (OUTPATIENT)
Dept: CARDIOLOGY | Facility: CLINIC | Age: OVER 89
Discharge: HOME | End: 2025-06-18
Attending: INTERNAL MEDICINE
Payer: COMMERCIAL

## 2025-06-18 VITALS — HEART RATE: 95 BPM | SYSTOLIC BLOOD PRESSURE: 171 MMHG | DIASTOLIC BLOOD PRESSURE: 85 MMHG

## 2025-06-18 DIAGNOSIS — I48.0 PAROXYSMAL ATRIAL FIBRILLATION (H): ICD-10-CM

## 2025-06-18 LAB
CREAT BLD-MCNC: 0.9 MG/DL (ref 0.7–1.2)
EGFRCR SERPLBLD CKD-EPI 2021: >60 ML/MIN/1.73M2

## 2025-06-18 PROCEDURE — 82565 ASSAY OF CREATININE: CPT

## 2025-06-18 PROCEDURE — 75572 CT HRT W/3D IMAGE: CPT

## 2025-06-18 PROCEDURE — 250N000011 HC RX IP 250 OP 636: Performed by: INTERNAL MEDICINE

## 2025-06-18 RX ORDER — LIDOCAINE 40 MG/G
CREAM TOPICAL
Status: DISCONTINUED | OUTPATIENT
Start: 2025-06-18 | End: 2025-06-19 | Stop reason: HOSPADM

## 2025-06-18 RX ORDER — ONDANSETRON 2 MG/ML
4 INJECTION INTRAMUSCULAR; INTRAVENOUS
Status: DISCONTINUED | OUTPATIENT
Start: 2025-06-18 | End: 2025-06-19 | Stop reason: HOSPADM

## 2025-06-18 RX ORDER — IOPAMIDOL 755 MG/ML
500 INJECTION, SOLUTION INTRAVASCULAR ONCE
Status: COMPLETED | OUTPATIENT
Start: 2025-06-18 | End: 2025-06-18

## 2025-06-18 RX ADMIN — IOPAMIDOL 100 ML: 755 INJECTION, SOLUTION INTRAVENOUS at 10:46

## 2025-06-19 ENCOUNTER — RESULTS FOLLOW-UP (OUTPATIENT)
Dept: CARDIOLOGY | Facility: CLINIC | Age: OVER 89
End: 2025-06-19

## 2025-06-23 ENCOUNTER — OFFICE VISIT (OUTPATIENT)
Dept: CARDIOLOGY | Facility: CLINIC | Age: OVER 89
End: 2025-06-23
Payer: COMMERCIAL

## 2025-06-23 VITALS
HEIGHT: 68 IN | SYSTOLIC BLOOD PRESSURE: 150 MMHG | BODY MASS INDEX: 24.86 KG/M2 | DIASTOLIC BLOOD PRESSURE: 78 MMHG | OXYGEN SATURATION: 97 % | WEIGHT: 164 LBS | HEART RATE: 82 BPM

## 2025-06-23 DIAGNOSIS — I48.0 PAROXYSMAL ATRIAL FIBRILLATION (H): Primary | ICD-10-CM

## 2025-06-23 NOTE — PROGRESS NOTES
Patient was seen by Dr. Santoro on 6/23/25 for LAAO consultation. Imaging completed CTA 6/18/25 shows that anatomy is suitable for closure with a WATCHMAN device.     Patient has a CHASVASC score of 3 and a HAS-BLED score of 3. Shared decision making was completed by Dr. Castillo on 4/14/25.     Will proceed with scheduling LAAO procedure. Placed orders. Will have scheduling contact patient to arrange H&P, labs, and procedure.

## 2025-06-23 NOTE — PROGRESS NOTES
HPI and Plan:   Daniel is here for Structural Heart Consultation for consideration for LAAO.  Patient was referred by Dr. Castillo.     Daniel has a history of falls and frequent bruising and bleeding.  He has gait instability.  He has been has a [id number] score of 3 and has bled score of 3.  I reviewed his CT scan showing chicken wing type appendage with no evidence of thrombus.  Does appear to be appropriate appropriate for Watchman device however would likely require a true steer.  Working and goal is DOBBS 40 caudal 15.    I explained the risk and benefits of the left atrial appendage to Mr. Sawant who would like to proceed.  Risk include but not limited to bleeding stroke emergency open heart surgery.  Device leak and thrombus.  I do agree with implantation appears reasonable and he would require short-term  anticoagulation which he appears to tolerate.      Today's clinic visit entailed:  Review of the result(s) of each unique test - echo, labs, CT  The following tests were independently interpreted by me as noted in my documentation: CTA  35 minutes spent by me on the date of the encounter doing chart review, history and exam, documentation and further activities per the note  Provider  Link to [facility] Help Grid     The level of medical decision making during this visit was of high complexity.      No orders of the defined types were placed in this encounter.    No orders of the defined types were placed in this encounter.    There are no discontinued medications.      Encounter Diagnosis   Name Primary?    Paroxysmal atrial fibrillation (H) Yes       CURRENT MEDICATIONS:  Current Outpatient Medications   Medication Sig Dispense Refill    amiodarone (PACERONE) 200 MG tablet TAKE 1 TAB ONCE DAILY 90 tablet 1    apixaban ANTICOAGULANT (ELIQUIS ANTICOAGULANT) 5 MG tablet TAKE ONE TABLET BY MOUTH TWICE DAILY 180 tablet 3    arginine HCl, L-arginine, 500 mg cap [ARGININE HCL, L-ARGININE, 500 MG CAP] Take 500 mg by  mouth 2 (two) times a day.       diltiazem ER (DILT-XR) 180 MG 24 hr capsule TAKE 1 CAPSULE (180 MG) BY MOUTH WITH  MG CAP. TOTAL  MG DAILY. 90 capsule 2    diltiazem ER (DILT-XR) 240 MG 24 hr ER beaded capsule TAKE 240 MG CAp BY MOUTH WITH  MG FOR TOTAL DAILY DOSE  MG daily 90 capsule 3    DOCOSAHEXANOIC ACID/EPA (FISH OIL ORAL) Take 500 mg by mouth 3 times daily      furosemide (LASIX) 40 MG tablet [FUROSEMIDE (LASIX) 40 MG TABLET] Take 1 tablet (40 mg total) by mouth daily. 30 tablet 0    grape seed extract (GRAPE SEED ORAL) [GRAPE SEED EXTRACT (GRAPE SEED ORAL)] Take 1 tablet by mouth daily.      lactase (LACTAID) 3,000 unit tablet [LACTASE (LACTAID) 3,000 UNIT TABLET] Take 1 tablet (3,000 Units total) by mouth 3 (three) times a day with meals.  0    levOCARNitine (L-CARNITINE) 500 mg Tab [LEVOCARNITINE (L-CARNITINE) 500 MG TAB] Take 500 mg by mouth daily.      metoprolol tartrate (LOPRESSOR) 50 MG tablet Take 1.5 tablets (75 mg) by mouth 2 times daily 270 tablet 3    multivitamin with minerals (THERA-M) 9 mg iron-400 mcg Tab tablet [MULTIVITAMIN WITH MINERALS (THERA-M) 9 MG IRON-400 MCG TAB TABLET] Take 1 tablet by mouth daily.      UNABLE TO FIND Take 3 tablets by mouth daily. Grape seed extract         ALLERGIES     Allergies   Allergen Reactions    Simvastatin      Other reaction(s): muscle aches       PAST MEDICAL HISTORY:  Past Medical History:   Diagnosis Date    Arthritis     bilateral hands    Atrial fibrillation (H)     Atrial flutter (H)     Cancer (H)     skin cancer    Gout     Hypertension        PAST SURGICAL HISTORY:  Past Surgical History:   Procedure Laterality Date    BIOPSY SKIN (LOCATION)      to remove skin cancer    CIRCUMCISION      EP PACEMAKER INSERT N/A 2/8/2021    Procedure: EP Pacemaker Insertion;  Surgeon: Bautista Umana MD;  Location: [facility] Cardiac Cath Lab;  Service: Cardiology    HERNIA REPAIR Right 1988    inquinal hernia    TONSILLECTOMY    "      FAMILY HISTORY:  No family history on file.    SOCIAL HISTORY:  Social History     Socioeconomic History    Marital status:    Tobacco Use    Smoking status: Never    Smokeless tobacco: Never   Substance and Sexual Activity    Alcohol use: No     Comment: Alcoholic Drinks/day: very little    Drug use: No     Social Drivers of Health      Received from [facility]    Financial Resource Strain   Food Insecurity: No Food Insecurity (5/23/2024)    Received from [employer] [facility]    Hunger Vital Sign     Worried About Running Out of Food in the Last Year: Never true     Ran Out of Food in the Last Year: Never true   Transportation Needs: No Transportation Needs (5/23/2024)    [facility]    PRAPARE - Transportation     Lack of Transportation (Medical): No     Lack of Transportation (Non-Medical): No    Received from [facility]    Social Connections   Interpersonal Safety: Not At Risk (5/22/2024)    [facility]    EH IP Custom IPV     Do you feel UNSAFE in any of your personal relationships with your family members or any other acquaintances?: No   Housing Stability: Low Risk  (5/23/2024)    [facility] [place] [facility]    Housing Stability Vital Sign     Unable to Pay for Housing in the Last Year: No     Number of Times Moved in the Last Year: 0     Homeless in the Last Year: No       Review of Systems:  Skin:        Eyes:       ENT:       Respiratory:       Cardiovascular:       Gastroenterology:      Genitourinary:       Musculoskeletal:       Neurologic:       Psychiatric:       Heme/Lymph/Imm:       Endocrine:         Physical Exam:  Vitals: BP (!) 150/78   Pulse 82   Ht 1.715 m (5' 7.5\")   Wt 74.4 kg (164 lb)   SpO2 97%   BMI 25.31 kg/m      Constitutional:           Skin:     " "        Head:           Eyes:           Lymph:      ENT:           Neck:           Respiratory:            Cardiac:                                                           GI:           Extremities and Muscular Skeletal:                 Neurological:           Psych:         Recent Lab Results:  LIPID RESULTS:  Lab Results   Component Value Date    CHOL 225 (H) 04/18/2022    HDL 53 04/18/2022     (H) 04/18/2022    TRIG 169 (H) 04/18/2022       LIVER ENZYME RESULTS:  Lab Results   Component Value Date    AST 28 04/01/2024    ALT 32 04/01/2024       CBC RESULTS:  Lab Results   Component Value Date    WBC 8.8 02/07/2021    RBC 4.23 (L) 02/07/2021    HGB 13.4 (L) 02/07/2021    HCT 39.9 (L) 02/07/2021    MCV 94 02/07/2021    MCH 31.7 02/07/2021    MCHC 33.6 02/07/2021    RDW 15.0 (H) 02/07/2021     02/09/2021       BMP RESULTS:  Lab Results   Component Value Date     04/01/2024    POTASSIUM 4.4 04/01/2024    POTASSIUM 4.0 04/18/2022    CHLORIDE 102 04/01/2024    CHLORIDE 103 04/18/2022    CO2 32 (H) 04/01/2024    CO2 31 04/18/2022    ANIONGAP 8 04/01/2024    ANIONGAP 10 04/18/2022     (H) 04/01/2024     04/18/2022    BUN 16.0 04/01/2024    BUN 13 04/18/2022    CR 0.9 06/18/2025    CR 1.06 04/01/2024    GFRESTIMATED >60 06/18/2025    GFRESTIMATED >60 02/18/2021    GFRESTBLACK >60 02/18/2021    DARCY 9.8 04/01/2024        A1C RESULTS:  No results found for: \"A1C\"    INR RESULTS:  Lab Results   Component Value Date    INR 1.33 (H) 02/04/2021           CC  Esperanza Castillo MD  [address] [place] [address]                "

## 2025-06-23 NOTE — LETTER
6/23/2025    Esperanza Castillo MD  [address]  [facility] [address]    RE: Daniel Sawant       Dear Colleague,     I had the pleasure of seeing Daniel Sawant in the [facility].  HPI and Plan:   Daniel is here for Structural Heart Consultation for consideration for LAAO.  Patient was referred by Dr. Csatillo.     Daniel has a history of falls and frequent bruising and bleeding.  He has gait instability.  He has been has a [id number] score of 3 and has bled score of 3.  I reviewed his CT scan showing chicken wing type appendage with no evidence of thrombus.  Does appear to be appropriate appropriate for Watchman device however would likely require a true steer.  Working and goal is DOBBS 40 caudal 15.    I explained the risk and benefits of the left atrial appendage to Mr. Sawant who would like to proceed.  Risk include but not limited to bleeding stroke emergency open heart surgery.  Device leak and thrombus.  I do agree with implantation appears reasonable and he would require short-term  anticoagulation which he appears to tolerate.      Today's clinic visit entailed:  Review of the result(s) of each unique test - echo, labs, CT  The following tests were independently interpreted by me as noted in my documentation: CTA  35 minutes spent by me on the date of the encounter doing chart review, history and exam, documentation and further activities per the note  Provider  Link to [facility] Help Grid     The level of medical decision making during this visit was of high complexity.      No orders of the defined types were placed in this encounter.    No orders of the defined types were placed in this encounter.    There are no discontinued medications.      Encounter Diagnosis   Name Primary?     Paroxysmal atrial fibrillation (H) Yes       CURRENT MEDICATIONS:  Current Outpatient Medications   Medication Sig Dispense Refill     amiodarone (PACERONE) 200 MG tablet TAKE 1 TAB ONCE DAILY 90  tablet 1     apixaban ANTICOAGULANT (ELIQUIS ANTICOAGULANT) 5 MG tablet TAKE ONE TABLET BY MOUTH TWICE DAILY 180 tablet 3     arginine HCl, L-arginine, 500 mg cap [ARGININE HCL, L-ARGININE, 500 MG CAP] Take 500 mg by mouth 2 (two) times a day.        diltiazem ER (DILT-XR) 180 MG 24 hr capsule TAKE 1 CAPSULE (180 MG) BY MOUTH WITH  MG CAP. TOTAL  MG DAILY. 90 capsule 2     diltiazem ER (DILT-XR) 240 MG 24 hr ER beaded capsule TAKE 240 MG CAp BY MOUTH WITH  MG FOR TOTAL DAILY DOSE  MG daily 90 capsule 3     DOCOSAHEXANOIC ACID/EPA (FISH OIL ORAL) Take 500 mg by mouth 3 times daily       furosemide (LASIX) 40 MG tablet [FUROSEMIDE (LASIX) 40 MG TABLET] Take 1 tablet (40 mg total) by mouth daily. 30 tablet 0     grape seed extract (GRAPE SEED ORAL) [GRAPE SEED EXTRACT (GRAPE SEED ORAL)] Take 1 tablet by mouth daily.       lactase (LACTAID) 3,000 unit tablet [LACTASE (LACTAID) 3,000 UNIT TABLET] Take 1 tablet (3,000 Units total) by mouth 3 (three) times a day with meals.  0     levOCARNitine (L-CARNITINE) 500 mg Tab [LEVOCARNITINE (L-CARNITINE) 500 MG TAB] Take 500 mg by mouth daily.       metoprolol tartrate (LOPRESSOR) 50 MG tablet Take 1.5 tablets (75 mg) by mouth 2 times daily 270 tablet 3     multivitamin with minerals (THERA-M) 9 mg iron-400 mcg Tab tablet [MULTIVITAMIN WITH MINERALS (THERA-M) 9 MG IRON-400 MCG TAB TABLET] Take 1 tablet by mouth daily.       UNABLE TO FIND Take 3 tablets by mouth daily. Grape seed extract         ALLERGIES     Allergies   Allergen Reactions     Simvastatin      Other reaction(s): muscle aches       PAST MEDICAL HISTORY:  Past Medical History:   Diagnosis Date     Arthritis     bilateral hands     Atrial fibrillation (H)      Atrial flutter (H)      Cancer (H)     skin cancer     Gout      Hypertension        PAST SURGICAL HISTORY:  Past Surgical History:   Procedure Laterality Date     BIOPSY SKIN (LOCATION)      to remove skin cancer     CIRCUMCISION        EP PACEMAKER INSERT N/A 2/8/2021    Procedure: EP Pacemaker Insertion;  Surgeon: Bautista Umana MD;  Location: [facility] Cardiac Cath Lab;  Service: Cardiology     HERNIA REPAIR Right 1988    inquinal hernia     TONSILLECTOMY         FAMILY HISTORY:  No family history on file.    SOCIAL HISTORY:  Social History     Socioeconomic History     Marital status:    Tobacco Use     Smoking status: Never     Smokeless tobacco: Never   Substance and Sexual Activity     Alcohol use: No     Comment: Alcoholic Drinks/day: very little     Drug use: No     Social Drivers of Health      [facility]    Financial Resource Strain   Food Insecurity: No Food Insecurity (5/23/2024)    Received from [employer] [facility]    Hunger Vital Sign      Worried About Running Out of Food in the Last Year: Never true      Ran Out of Food in the Last Year: Never true   Transportation Needs: No Transportation Needs (5/23/2024)    Received from [employer] [facility]    PRAPARE - Transportation      Lack of Transportation (Medical): No      Lack of Transportation (Non-Medical): No    Received from [employer]    Social Connections   Interpersonal Safety: Not At Risk (5/22/2024)    Received from [employer] [facility]    EH IP Custom IPV      Do you feel UNSAFE in any of your personal relationships with your family members or any other acquaintances?: No   Housing Stability: Low Risk  (5/23/2024)    Received from [employer] [facility]    Housing Stability Vital Sign      Unable to Pay for Housing in the Last Year: No      Number of Times Moved in the Last Year: 0      Homeless in the Last Year: No       Review of Systems:  Skin:        Eyes:       ENT:       Respiratory:       Cardiovascular:       Gastroenterology:      Genitourinary:       Musculoskeletal:      "  Neurologic:       Psychiatric:       Heme/Lymph/Imm:       Endocrine:         Physical Exam:  Vitals: BP (!) 150/78   Pulse 82   Ht 1.715 m (5' 7.5\")   Wt 74.4 kg (164 lb)   SpO2 97%   BMI 25.31 kg/m      Constitutional:           Skin:             Head:           Eyes:           Lymph:      ENT:           Neck:           Respiratory:            Cardiac:                                                           GI:           Extremities and Muscular Skeletal:                 Neurological:           Psych:         Recent Lab Results:  LIPID RESULTS:  Lab Results   Component Value Date    CHOL 225 (H) 04/18/2022    HDL 53 04/18/2022     (H) 04/18/2022    TRIG 169 (H) 04/18/2022       LIVER ENZYME RESULTS:  Lab Results   Component Value Date    AST 28 04/01/2024    ALT 32 04/01/2024       CBC RESULTS:  Lab Results   Component Value Date    WBC 8.8 02/07/2021    RBC 4.23 (L) 02/07/2021    HGB 13.4 (L) 02/07/2021    HCT 39.9 (L) 02/07/2021    MCV 94 02/07/2021    MCH 31.7 02/07/2021    MCHC 33.6 02/07/2021    RDW 15.0 (H) 02/07/2021     02/09/2021       BMP RESULTS:  Lab Results   Component Value Date     04/01/2024    POTASSIUM 4.4 04/01/2024    POTASSIUM 4.0 04/18/2022    CHLORIDE 102 04/01/2024    CHLORIDE 103 04/18/2022    CO2 32 (H) 04/01/2024    CO2 31 04/18/2022    ANIONGAP 8 04/01/2024    ANIONGAP 10 04/18/2022     (H) 04/01/2024     04/18/2022    BUN 16.0 04/01/2024    BUN 13 04/18/2022    CR 0.9 06/18/2025    CR 1.06 04/01/2024    GFRESTIMATED >60 06/18/2025    GFRESTIMATED >60 02/18/2021    GFRESTBLACK >60 02/18/2021    DARCY 9.8 04/01/2024        A1C RESULTS:  No results found for: \"A1C\"    INR RESULTS:  Lab Results   Component Value Date    INR 1.33 (H) 02/04/2021           CC  Esperanza Castillo MD  [address] [place] [address]                  Patient was seen by Dr. Santoro on 6/23/25 for LAAO consultation. Imaging completed CTA 6/18/25 shows " that anatomy is suitable for closure with a WATCHMAN device.     Patient has a CHASVASC score of 3 and a HAS-BLED score of 3. Shared decision making was completed by Dr. Castillo on 4/14/25.     Will proceed with scheduling LAAO procedure. Placed orders. Will have scheduling contact patient to arrange H&P, labs, and procedure.      Thank you for allowing me to participate in the care of your patient.      Sincerely,     HERMELINDA PRATT MD     [facility]  cc:   Esperanza Castillo MD  [address] [place] [address]

## 2025-08-17 LAB
ABO + RH BLD: NORMAL
BLD GP AB SCN SERPL QL: NEGATIVE
SPECIMEN EXP DATE BLD: NORMAL

## 2025-08-18 ENCOUNTER — LAB (OUTPATIENT)
Dept: LAB | Facility: CLINIC | Age: OVER 89
DRG: 310 | End: 2025-08-18
Payer: COMMERCIAL

## 2025-08-18 ENCOUNTER — OFFICE VISIT (OUTPATIENT)
Dept: CARDIOLOGY | Facility: CLINIC | Age: OVER 89
End: 2025-08-18
Payer: COMMERCIAL

## 2025-08-18 VITALS
HEART RATE: 78 BPM | OXYGEN SATURATION: 98 % | DIASTOLIC BLOOD PRESSURE: 74 MMHG | WEIGHT: 163 LBS | BODY MASS INDEX: 24.71 KG/M2 | SYSTOLIC BLOOD PRESSURE: 128 MMHG | HEIGHT: 68 IN

## 2025-08-18 DIAGNOSIS — I48.0 PAROXYSMAL ATRIAL FIBRILLATION (H): ICD-10-CM

## 2025-08-18 DIAGNOSIS — I48.0 PAROXYSMAL ATRIAL FIBRILLATION (H): Primary | ICD-10-CM

## 2025-08-18 LAB
ANION GAP SERPL CALCULATED.3IONS-SCNC: 13 MMOL/L (ref 7–15)
BUN SERPL-MCNC: 14.3 MG/DL (ref 8–23)
CALCIUM SERPL-MCNC: 9.4 MG/DL (ref 8.8–10.4)
CHLORIDE SERPL-SCNC: 105 MMOL/L (ref 98–107)
CREAT SERPL-MCNC: 0.86 MG/DL (ref 0.67–1.17)
EGFRCR SERPLBLD CKD-EPI 2021: 82 ML/MIN/1.73M2
ERYTHROCYTE [DISTWIDTH] IN BLOOD BY AUTOMATED COUNT: 15.3 % (ref 10–15)
GLUCOSE SERPL-MCNC: 128 MG/DL (ref 70–99)
HCO3 SERPL-SCNC: 26 MMOL/L (ref 22–29)
HCT VFR BLD AUTO: 44 % (ref 40–53)
HGB BLD-MCNC: 14.8 G/DL (ref 13.3–17.7)
INR PPP: 1.3 (ref 0.85–1.15)
MCH RBC QN AUTO: 32 PG (ref 26.5–33)
MCHC RBC AUTO-ENTMCNC: 33.6 G/DL (ref 31.5–36.5)
MCV RBC AUTO: 95 FL (ref 78–100)
PLATELET # BLD AUTO: 161 10E3/UL (ref 150–450)
POTASSIUM SERPL-SCNC: 3.9 MMOL/L (ref 3.4–5.3)
PROTHROMBIN TIME: 15.8 SECONDS (ref 11.8–14.8)
RBC # BLD AUTO: 4.63 10E6/UL (ref 4.4–5.9)
SODIUM SERPL-SCNC: 144 MMOL/L (ref 135–145)
WBC # BLD AUTO: 8.57 10E3/UL (ref 4–11)

## 2025-08-18 PROCEDURE — 3074F SYST BP LT 130 MM HG: CPT | Performed by: NURSE PRACTITIONER

## 2025-08-18 PROCEDURE — 86901 BLOOD TYPING SEROLOGIC RH(D): CPT

## 2025-08-18 PROCEDURE — 85610 PROTHROMBIN TIME: CPT

## 2025-08-18 PROCEDURE — 85018 HEMOGLOBIN: CPT

## 2025-08-18 PROCEDURE — 36415 COLL VENOUS BLD VENIPUNCTURE: CPT

## 2025-08-18 PROCEDURE — 80048 BASIC METABOLIC PNL TOTAL CA: CPT

## 2025-08-18 PROCEDURE — 3078F DIAST BP <80 MM HG: CPT | Performed by: NURSE PRACTITIONER

## 2025-08-18 PROCEDURE — 99214 OFFICE O/P EST MOD 30 MIN: CPT | Performed by: NURSE PRACTITIONER

## 2025-08-21 DIAGNOSIS — I48.0 PAROXYSMAL ATRIAL FIBRILLATION (H): Primary | ICD-10-CM

## 2025-08-21 RX ORDER — LIDOCAINE 40 MG/G
CREAM TOPICAL
OUTPATIENT
Start: 2025-08-21

## 2025-08-21 RX ORDER — ASPIRIN 81 MG/1
81 TABLET ORAL ONCE
OUTPATIENT
Start: 2025-08-21 | End: 2025-08-21

## 2025-08-21 RX ORDER — CEFAZOLIN SODIUM 2 G/100ML
2 INJECTION, SOLUTION INTRAVENOUS
OUTPATIENT
Start: 2025-08-21

## 2025-08-21 RX ORDER — SODIUM CHLORIDE 9 MG/ML
1000 INJECTION, SOLUTION INTRAVENOUS CONTINUOUS
OUTPATIENT
Start: 2025-08-21

## 2025-08-26 ENCOUNTER — ANESTHESIA EVENT (OUTPATIENT)
Dept: CARDIOLOGY | Facility: CLINIC | Age: OVER 89
End: 2025-08-26
Payer: COMMERCIAL

## 2025-08-26 ASSESSMENT — ENCOUNTER SYMPTOMS: DYSRHYTHMIAS: 1

## 2025-08-27 ENCOUNTER — HOSPITAL ENCOUNTER (INPATIENT)
Dept: CARDIOLOGY | Facility: CLINIC | Age: OVER 89
Setting detail: SURGERY ADMIT
Discharge: HOME | DRG: 274 | End: 2025-08-27
Attending: INTERNAL MEDICINE | Admitting: INTERNAL MEDICINE
Payer: COMMERCIAL

## 2025-08-27 ENCOUNTER — HOSPITAL ENCOUNTER (OUTPATIENT)
Facility: CLINIC | Age: OVER 89
Discharge: HOME | DRG: 274 | End: 2025-08-27
Attending: INTERNAL MEDICINE | Admitting: INTERNAL MEDICINE
Payer: COMMERCIAL

## 2025-08-27 ENCOUNTER — ANESTHESIA (OUTPATIENT)
Dept: CARDIOLOGY | Facility: CLINIC | Age: OVER 89
End: 2025-08-27
Payer: COMMERCIAL

## 2025-08-27 DIAGNOSIS — I48.0 PAROXYSMAL ATRIAL FIBRILLATION (H): Primary | ICD-10-CM

## 2025-08-27 PROCEDURE — 250N000011 HC RX IP 250 OP 636

## 2025-08-27 PROCEDURE — 258N000003 HC RX IP 258 OP 636

## 2025-08-27 PROCEDURE — 250N000009 HC RX 250

## 2025-08-27 PROCEDURE — 250N000011 HC RX IP 250 OP 636: Performed by: INTERNAL MEDICINE

## 2025-08-27 RX ORDER — SODIUM CHLORIDE, SODIUM LACTATE, POTASSIUM CHLORIDE, CALCIUM CHLORIDE 600; 310; 30; 20 MG/100ML; MG/100ML; MG/100ML; MG/100ML
INJECTION, SOLUTION INTRAVENOUS CONTINUOUS PRN
Status: DISCONTINUED | OUTPATIENT
Start: 2025-08-27 | End: 2025-08-27

## 2025-08-27 RX ORDER — LIDOCAINE HYDROCHLORIDE 20 MG/ML
INJECTION, SOLUTION INFILTRATION; PERINEURAL PRN
Status: DISCONTINUED | OUTPATIENT
Start: 2025-08-27 | End: 2025-08-27

## 2025-08-27 RX ORDER — PROPOFOL 10 MG/ML
INJECTION, EMULSION INTRAVENOUS PRN
Status: DISCONTINUED | OUTPATIENT
Start: 2025-08-27 | End: 2025-08-27

## 2025-08-27 RX ORDER — DEXAMETHASONE SODIUM PHOSPHATE 4 MG/ML
INJECTION, SOLUTION INTRA-ARTICULAR; INTRALESIONAL; INTRAMUSCULAR; INTRAVENOUS; SOFT TISSUE PRN
Status: DISCONTINUED | OUTPATIENT
Start: 2025-08-27 | End: 2025-08-27

## 2025-08-27 RX ORDER — FENTANYL CITRATE 50 UG/ML
INJECTION, SOLUTION INTRAMUSCULAR; INTRAVENOUS PRN
Status: DISCONTINUED | OUTPATIENT
Start: 2025-08-27 | End: 2025-08-27

## 2025-08-27 RX ORDER — ONDANSETRON 2 MG/ML
INJECTION INTRAMUSCULAR; INTRAVENOUS PRN
Status: DISCONTINUED | OUTPATIENT
Start: 2025-08-27 | End: 2025-08-27

## 2025-08-27 RX ADMIN — FENTANYL CITRATE 50 MCG: 50 INJECTION INTRAMUSCULAR; INTRAVENOUS at 08:45

## 2025-08-27 RX ADMIN — PHENYLEPHRINE HYDROCHLORIDE 100 MCG: 10 INJECTION INTRAVENOUS at 09:10

## 2025-08-27 RX ADMIN — Medication 2 G: at 08:49

## 2025-08-27 RX ADMIN — PHENYLEPHRINE HYDROCHLORIDE 0.5 MCG/KG/MIN: 10 INJECTION INTRAVENOUS at 09:17

## 2025-08-27 RX ADMIN — PHENYLEPHRINE HYDROCHLORIDE 100 MCG: 10 INJECTION INTRAVENOUS at 09:19

## 2025-08-27 RX ADMIN — LIDOCAINE HYDROCHLORIDE 40 MG: 20 INJECTION, SOLUTION INFILTRATION; PERINEURAL at 08:45

## 2025-08-27 RX ADMIN — SUGAMMADEX 200 MG: 100 INJECTION, SOLUTION INTRAVENOUS at 10:30

## 2025-08-27 RX ADMIN — ROCURONIUM BROMIDE 30 MG: 50 INJECTION, SOLUTION INTRAVENOUS at 08:45

## 2025-08-27 RX ADMIN — ONDANSETRON 4 MG: 2 INJECTION INTRAMUSCULAR; INTRAVENOUS at 10:17

## 2025-08-27 RX ADMIN — SODIUM CHLORIDE, SODIUM LACTATE, POTASSIUM CHLORIDE, AND CALCIUM CHLORIDE: .6; .31; .03; .02 INJECTION, SOLUTION INTRAVENOUS at 08:39

## 2025-08-27 RX ADMIN — DEXAMETHASONE SODIUM PHOSPHATE 4 MG: 4 INJECTION, SOLUTION INTRA-ARTICULAR; INTRALESIONAL; INTRAMUSCULAR; INTRAVENOUS; SOFT TISSUE at 08:56

## 2025-08-27 RX ADMIN — ROCURONIUM BROMIDE 10 MG: 50 INJECTION, SOLUTION INTRAVENOUS at 10:08

## 2025-08-27 RX ADMIN — PROPOFOL 150 MG: 10 INJECTION, EMULSION INTRAVENOUS at 08:45

## 2025-08-27 RX ADMIN — ROCURONIUM BROMIDE 10 MG: 50 INJECTION, SOLUTION INTRAVENOUS at 09:11

## 2025-08-27 ASSESSMENT — ACTIVITIES OF DAILY LIVING (ADL)
EQUIPMENT_CURRENTLY_USED_AT_HOME: WALKER, ROLLING
ADLS_ACUITY_SCORE: 24
ADLS_ACUITY_SCORE: 41
DOING_ERRANDS_INDEPENDENTLY_DIFFICULTY: NO
ADLS_ACUITY_SCORE: 24
ADLS_ACUITY_SCORE: 24
HEARING_DIFFICULTY_OR_DEAF: NO
ADLS_ACUITY_SCORE: 24
TOILETING_ISSUES: NO
ADLS_ACUITY_SCORE: 24
CHANGE_IN_FUNCTIONAL_STATUS_SINCE_ONSET_OF_CURRENT_ILLNESS/INJURY: NO
ADLS_ACUITY_SCORE: 24
DIFFICULTY_COMMUNICATING: NO
DIFFICULTY_EATING/SWALLOWING: NO
ADLS_ACUITY_SCORE: 24
NUMBER_OF_TIMES_PATIENT_HAS_FALLEN_WITHIN_LAST_SIX_MONTHS: 2
WALKING_OR_CLIMBING_STAIRS: AMBULATION DIFFICULTY, REQUIRES EQUIPMENT
WEAR_GLASSES_OR_BLIND: NO
WALKING_OR_CLIMBING_STAIRS_DIFFICULTY: YES
FALL_HISTORY_WITHIN_LAST_SIX_MONTHS: YES
CONCENTRATING,_REMEMBERING_OR_MAKING_DECISIONS_DIFFICULTY: NO
ADLS_ACUITY_SCORE: 24
DRESSING/BATHING_DIFFICULTY: NO

## 2025-08-27 ASSESSMENT — ENCOUNTER SYMPTOMS: SEIZURES: 0

## 2025-08-27 ASSESSMENT — LIFESTYLE VARIABLES: TOBACCO_USE: 0
